# Patient Record
Sex: FEMALE | Race: WHITE | NOT HISPANIC OR LATINO | Employment: UNEMPLOYED | URBAN - METROPOLITAN AREA
[De-identification: names, ages, dates, MRNs, and addresses within clinical notes are randomized per-mention and may not be internally consistent; named-entity substitution may affect disease eponyms.]

---

## 2017-01-06 ENCOUNTER — TELEPHONE (OUTPATIENT)
Dept: INTERNAL MEDICINE | Age: 32
End: 2017-01-06

## 2017-01-06 RX ORDER — CLONAZEPAM 1 MG/1
1 TABLET ORAL 3 TIMES DAILY PRN
Qty: 90 TABLET | Refills: 0 | Status: SHIPPED
Start: 2017-01-06 | End: 2017-02-03 | Stop reason: SDUPTHER

## 2017-01-06 RX ORDER — SUMATRIPTAN 100 MG/1
100 TABLET, FILM COATED ORAL PRN
Qty: 9 TABLET | Refills: 5 | Status: SHIPPED | OUTPATIENT
Start: 2017-01-06 | End: 2017-06-30 | Stop reason: SDUPTHER

## 2017-01-06 RX ORDER — SERTRALINE HYDROCHLORIDE 100 MG/1
100 TABLET, FILM COATED ORAL DAILY
Qty: 30 TABLET | Refills: 5 | Status: SHIPPED | OUTPATIENT
Start: 2017-01-06 | End: 2017-07-17 | Stop reason: SDUPTHER

## 2017-01-30 RX ORDER — BUPROPION HYDROCHLORIDE 300 MG/1
300 TABLET ORAL DAILY
Qty: 30 TABLET | Refills: 5 | Status: SHIPPED | OUTPATIENT
Start: 2017-01-30 | End: 2017-09-07 | Stop reason: SDUPTHER

## 2017-02-06 RX ORDER — CLONAZEPAM 1 MG/1
1 TABLET ORAL 3 TIMES DAILY PRN
Qty: 90 TABLET | Refills: 0 | Status: SHIPPED
Start: 2017-02-06 | End: 2017-03-03 | Stop reason: SDUPTHER

## 2017-03-06 RX ORDER — CLONAZEPAM 1 MG/1
1 TABLET ORAL 3 TIMES DAILY
Qty: 90 TABLET | Refills: 0 | Status: SHIPPED
Start: 2017-03-08 | End: 2017-04-07 | Stop reason: SDUPTHER

## 2017-04-07 RX ORDER — CLONAZEPAM 1 MG/1
1 TABLET ORAL 3 TIMES DAILY
Qty: 90 TABLET | Refills: 0 | Status: SHIPPED
Start: 2017-04-07 | End: 2017-05-07 | Stop reason: SDUPTHER

## 2017-05-07 ENCOUNTER — TELEPHONE (OUTPATIENT)
Dept: PSYCHIATRY | Age: 32
End: 2017-05-07

## 2017-05-08 ENCOUNTER — TELEPHONE (OUTPATIENT)
Dept: INTERNAL MEDICINE | Age: 32
End: 2017-05-08

## 2017-05-09 RX ORDER — CLONAZEPAM 1 MG/1
1 TABLET ORAL 3 TIMES DAILY
Qty: 90 TABLET | Refills: 0 | Status: SHIPPED
Start: 2017-05-09

## 2017-05-09 RX ORDER — QUETIAPINE FUMARATE 100 MG/1
200 TABLET, FILM COATED ORAL NIGHTLY
Qty: 60 TABLET | Refills: 5 | Status: SHIPPED | OUTPATIENT
Start: 2017-05-09

## 2017-06-28 ENCOUNTER — TELEPHONE (OUTPATIENT)
Dept: INTERNAL MEDICINE | Age: 32
End: 2017-06-28

## 2017-06-30 RX ORDER — SUMATRIPTAN 100 MG/1
100 TABLET, FILM COATED ORAL PRN
Qty: 9 TABLET | Refills: 5 | Status: SHIPPED | OUTPATIENT
Start: 2017-06-30 | End: 2018-01-09 | Stop reason: SDUPTHER

## 2017-07-17 RX ORDER — SERTRALINE HYDROCHLORIDE 100 MG/1
100 TABLET, FILM COATED ORAL DAILY
Qty: 30 TABLET | Refills: 5 | Status: SHIPPED | OUTPATIENT
Start: 2017-07-17

## 2017-09-08 RX ORDER — BUPROPION HYDROCHLORIDE 300 MG/1
300 TABLET ORAL DAILY
Qty: 30 TABLET | Refills: 0 | Status: SHIPPED | OUTPATIENT
Start: 2017-09-08

## 2017-09-22 ENCOUNTER — OFFICE VISIT (OUTPATIENT)
Dept: FAMILY MEDICINE | Facility: CLINIC | Age: 32
End: 2017-09-22
Payer: COMMERCIAL

## 2017-09-22 VITALS
SYSTOLIC BLOOD PRESSURE: 102 MMHG | WEIGHT: 140.9 LBS | OXYGEN SATURATION: 96 % | BODY MASS INDEX: 24.05 KG/M2 | HEART RATE: 102 BPM | HEIGHT: 64 IN | DIASTOLIC BLOOD PRESSURE: 62 MMHG | TEMPERATURE: 99.4 F

## 2017-09-22 DIAGNOSIS — R10.84 ABDOMINAL PAIN, GENERALIZED: Primary | ICD-10-CM

## 2017-09-22 DIAGNOSIS — M79.7 FIBROMYALGIA: ICD-10-CM

## 2017-09-22 DIAGNOSIS — G43.009 MIGRAINE WITHOUT AURA AND WITHOUT STATUS MIGRAINOSUS, NOT INTRACTABLE: ICD-10-CM

## 2017-09-22 DIAGNOSIS — F33.1 MODERATE EPISODE OF RECURRENT MAJOR DEPRESSIVE DISORDER (H): ICD-10-CM

## 2017-09-22 PROBLEM — Z13.6 CARDIOVASCULAR SCREENING; LDL GOAL LESS THAN 160: Status: ACTIVE | Noted: 2017-09-22

## 2017-09-22 LAB
BASOPHILS # BLD AUTO: 0 10E9/L (ref 0–0.2)
BASOPHILS NFR BLD AUTO: 0.3 %
DIFFERENTIAL METHOD BLD: NORMAL
EOSINOPHIL # BLD AUTO: 0.3 10E9/L (ref 0–0.7)
EOSINOPHIL NFR BLD AUTO: 4.4 %
ERYTHROCYTE [DISTWIDTH] IN BLOOD BY AUTOMATED COUNT: 11.9 % (ref 10–15)
HCT VFR BLD AUTO: 39.9 % (ref 35–47)
HGB BLD-MCNC: 12.9 G/DL (ref 11.7–15.7)
LYMPHOCYTES # BLD AUTO: 1.9 10E9/L (ref 0.8–5.3)
LYMPHOCYTES NFR BLD AUTO: 31.2 %
MCH RBC QN AUTO: 29.1 PG (ref 26.5–33)
MCHC RBC AUTO-ENTMCNC: 32.3 G/DL (ref 31.5–36.5)
MCV RBC AUTO: 90 FL (ref 78–100)
MONOCYTES # BLD AUTO: 0.6 10E9/L (ref 0–1.3)
MONOCYTES NFR BLD AUTO: 9.8 %
NEUTROPHILS # BLD AUTO: 3.2 10E9/L (ref 1.6–8.3)
NEUTROPHILS NFR BLD AUTO: 54.3 %
PLATELET # BLD AUTO: 251 10E9/L (ref 150–450)
RBC # BLD AUTO: 4.43 10E12/L (ref 3.8–5.2)
WBC # BLD AUTO: 5.9 10E9/L (ref 4–11)

## 2017-09-22 PROCEDURE — 80053 COMPREHEN METABOLIC PANEL: CPT | Performed by: NURSE PRACTITIONER

## 2017-09-22 PROCEDURE — 99204 OFFICE O/P NEW MOD 45 MIN: CPT | Performed by: NURSE PRACTITIONER

## 2017-09-22 PROCEDURE — 36415 COLL VENOUS BLD VENIPUNCTURE: CPT | Performed by: NURSE PRACTITIONER

## 2017-09-22 PROCEDURE — 85025 COMPLETE CBC W/AUTO DIFF WBC: CPT | Performed by: NURSE PRACTITIONER

## 2017-09-22 RX ORDER — SUMATRIPTAN 100 MG/1
TABLET, FILM COATED ORAL
COMMUNITY
Start: 2017-02-03

## 2017-09-22 RX ORDER — QUETIAPINE FUMARATE 100 MG/1
TABLET, FILM COATED ORAL
Refills: 5 | COMMUNITY
Start: 2017-09-07 | End: 2018-01-10

## 2017-09-22 RX ORDER — BUPROPION HYDROCHLORIDE 300 MG/1
TABLET ORAL
COMMUNITY
Start: 2017-01-30 | End: 2017-10-12

## 2017-09-22 RX ORDER — SERTRALINE HYDROCHLORIDE 100 MG/1
TABLET, FILM COATED ORAL
COMMUNITY
Start: 2017-02-15 | End: 2018-01-10

## 2017-09-22 ASSESSMENT — ANXIETY QUESTIONNAIRES
GAD7 TOTAL SCORE: 4
2. NOT BEING ABLE TO STOP OR CONTROL WORRYING: NOT AT ALL
5. BEING SO RESTLESS THAT IT IS HARD TO SIT STILL: SEVERAL DAYS
1. FEELING NERVOUS, ANXIOUS, OR ON EDGE: NOT AT ALL
6. BECOMING EASILY ANNOYED OR IRRITABLE: SEVERAL DAYS
IF YOU CHECKED OFF ANY PROBLEMS ON THIS QUESTIONNAIRE, HOW DIFFICULT HAVE THESE PROBLEMS MADE IT FOR YOU TO DO YOUR WORK, TAKE CARE OF THINGS AT HOME, OR GET ALONG WITH OTHER PEOPLE: SOMEWHAT DIFFICULT
7. FEELING AFRAID AS IF SOMETHING AWFUL MIGHT HAPPEN: NOT AT ALL
3. WORRYING TOO MUCH ABOUT DIFFERENT THINGS: SEVERAL DAYS

## 2017-09-22 ASSESSMENT — PATIENT HEALTH QUESTIONNAIRE - PHQ9
5. POOR APPETITE OR OVEREATING: SEVERAL DAYS
SUM OF ALL RESPONSES TO PHQ QUESTIONS 1-9: 8

## 2017-09-22 NOTE — MR AVS SNAPSHOT
After Visit Summary   9/22/2017    Jeannette Quesada    MRN: [id number]           Patient Information     Date Of Birth          1985        Visit Information        Provider Department      9/22/2017 8:40 AM Alda Mosquera Ra, APRN CNP [facility]        Today's Diagnoses     Abdominal pain, generalized    -  1    Migraine without aura and without status migrainosus, not intractable        Moderate episode of recurrent major depressive disorder (H)        Fibromyalgia           Follow-ups after your visit        Additional Services     PAIN MANAGEMENT CENTER ([place]) REFERRAL       Your provider has referred you to the [facility].    Reason for Referral: Comprehensive Evaluation and Management    Please complete the following questions:    What is your diagnosis for the patient's pain? Hx of fibromyalgia    Do you have any specific questions for the pain specialist? No    Are there any red flags that may impact the assessment or management of the patient? None    **ANY DIAGNOSTIC TESTS THAT ARE NOT IN EPIC SHOULD BE SENT TO THE PAIN CENTER**    Please note the Pre-Op Pain Consult must be scheduled 2-3 weeks prior to the patient's surgery.  Patient's trying to schedule within 2 weeks of surgery may not be accommodated.     Pre-Op Pain Consults are only good for 30 days.    REGARDING OPIOID MEDICATIONS:  We will always address appropriateness of opioid pain medications, but we generally will not automatically take on a prescribing role. When we do take on prescribing of opioids for chronic pain, it is in collaboration with the referring physician for an intermediate period of time (months), with an expectation that the primary physician or provider will assume the prescribing role if medications are effective at stable doses with demonstrated compliance.  Therefore, please do not assume that your prescribing responsibilities end on the day of pain clinic  "consultation.  Is this agreeable to you? YES    For any questions, contact the [facility] at [phone].    Please be aware that coverage of these services is subject to the terms and limitations of your health insurance plan.  Call member services at your health plan with any benefit or coverage questions.      Please bring the following with you to your appointment:    (1) Any X-Rays, CTs or MRIs which have been performed.  Contact the facility where they were done to arrange for  prior to your scheduled appointment.    (2) List of current medications   (3) This referral request   (4) Any documents/labs given to you for this referral                  Who to contact     If you have questions or need follow up information about today's clinic visit or your schedule please contact [facility] directly at [phone].  Normal or non-critical lab and imaging results will be communicated to you by MyChart, letter or phone within 4 business days after the clinic has received the results. If you do not hear from us within 7 days, please contact the clinic through MyChart or phone. If you have a critical or abnormal lab result, we will notify you by phone as soon as possible.  Submit refill requests through [employer] or call your pharmacy and they will forward the refill request to us. Please allow 3 business days for your refill to be completed.          Additional Information About Your Visit        ZALP Information     [employer] lets you send messages to your doctor, view your test results, renew your prescriptions, schedule appointments and more. To sign up, go to [email or web address] . Click on \"Log in\" on the left side of the screen, which will take you to the Welcome page. Then click on \"Sign up Now\" on the right side of the page.     You will be asked to enter the access code listed below, as well as some personal information. Please follow the directions to create " "your username and password.     Your access code is: [email or web address]  Expires: 2017  4:26 PM     Your access code will  in 90 days. If you need help or a new code, please call your [facility] clinic or [phone].        Care EveryWhere ID     This is your Care EveryWhere ID. This could be used by other organizations to access your [facility] medical records  [id number]        Your Vitals Were     Pulse Temperature Height Last Period Pulse Oximetry BMI (Body Mass Index)    102 99.4  F (37.4  C) (Oral) 5' 4\" (1.626 m) 2017 96% 24.19 kg/m2       Blood Pressure from Last 3 Encounters:   17 102/62    Weight from Last 3 Encounters:   17 140 lb 14.4 oz (63.9 kg)              We Performed the Following     CBC with platelets and differential     Comprehensive metabolic panel (BMP + Alb, Alk Phos, ALT, AST, Total. Bili, TP)     PAIN MANAGEMENT CENTER ([place]) REFERRAL        Primary Care Provider Office Phone # Fax #    Alda Stokes Eveline, APRN [facility] [phone]       [address] [facility] [address]        Equal Access to Services     PEPPER VASQUEZ AH: Hadii aad ku hadasho Soomaali, waaxda luqadaha, qaybta kaalmada adeegyada, waxay idiin hayaan adeeg óscararajeremiah la'yari . So [facility] [phone].    ATENCIÓN: Si habla español, tiene a modi disposición servicios gratuitos de asistencia lingüística. Llame al [phone].    We comply with applicable federal civil rights laws and [place] laws. We do not discriminate on the basis of race, color, national origin, age, disability sex, sexual orientation or gender identity.            Thank you!     Thank you for choosing [facility]  for your care. Our goal is always to provide you with excellent care. Hearing back from our patients is one way we can continue to improve our services. Please take a few minutes to complete the written survey that you may receive in the mail after your visit with us. Thank you!             Your " Updated Medication List - Protect others around you: Learn how to safely use, store and throw away your medicines at www.disposemymeds.org.          This list is accurate as of: 9/22/17  4:26 PM.  Always use your most recent med list.                   Brand Name Dispense Instructions for use Diagnosis    buPROPion 300 MG 24 hr tablet    WELLBUTRIN XL     TK 1 T PO D        QUEtiapine 100 MG tablet    SEROquel     TK 2 TS PO Q NIGHT        sertraline 100 MG tablet    ZOLOFT     TK 1 T PO D        SUMAtriptan 100 MG tablet    IMITREX     TK 1 T PO PRN FOR MIGRAINE. MAY REPEAT AFTER 1-2 HOURS IF INEFFECTIVE. MAX 2 T PER 24 HOURS.

## 2017-09-22 NOTE — PROGRESS NOTES
SUBJECTIVE:   Jeannette Quesada is a 32 year old female who presents to clinic today for the following health issues:      Abdominal Pain      Duration: 1 week    Description (location/character/radiation): Upper and lower abdomen gets crampy       Associated flank pain: None    Intensity:  moderate    Accompanying signs and symptoms:        Fever/Chills: no        Gas/Bloating: YES       Nausea/vomitting: YES       Diarrhea: YES- Regular stools now- Black x 2 days       Dysuria or Hematuria: no     History (previous similar pain/trauma/previous testing): No,     Precipitating or alleviating factors:       Pain worse with eating/BM/urination: Yes       Pain relieved by BM: YES    Therapies tried and outcome: Pepto and Milk of Mag without relief    LMP:  17    1 week of symptoms, slightly improved.  Initially had some vomiting.  This has resolved but still has some nausea.  Had diarrhea and black stools.  Black stools have resolved, but now having a mix of diarrhea and normal stools.  No fevers.  No known exposures.  Has still been eating and drinking throughout the week.  Smaller amounts of food, but still eating.  Food seems to worsen symptoms.    Last night ate 2 pieces of pizza and had a lot of pain and gas and bloating.  Pain was generalized.    No hx of ulcers.    She has been treated for 9 years by a clinic in [place] for depression.  Stable on meds, no change except dc of clonazepam.    Problem list and histories reviewed & adjusted, as indicated.  Additional history: as documented    Patient Active Problem List   Diagnosis     Migraine without aura and without status migrainosus, not intractable     Moderate episode of recurrent major depressive disorder (H)     CARDIOVASCULAR SCREENING; LDL GOAL LESS THAN 160     Fibromyalgia     Past Surgical History:   Procedure Laterality Date      SECTION       CHOLECYSTECTOMY, LAPOROSCOPIC       TUBAL LIGATION         Social History   Substance Use  "Topics     Smoking status: Current Every Day Smoker     Packs/day: 1.00     Types: Cigarettes     Smokeless tobacco: Never Used      Comment: 1 pack every other day     Alcohol use Yes      Comment: Rare     Family History   Problem Relation Age of Onset     Family History Negative Mother      Family History Negative Father              Reviewed and updated as needed this visit by clinical staff     Reviewed and updated as needed this visit by Provider         ROS:  SEE HPI.    OBJECTIVE:     /62  Pulse 102  Temp 99.4  F (37.4  C) (Oral)  Ht 5' 4\" (1.626 m)  Wt 140 lb 14.4 oz (63.9 kg)  LMP 08/28/2017  SpO2 96%  BMI 24.19 kg/m2  Body mass index is 24.19 kg/(m^2).  GENERAL: healthy, alert and no distress  RESP: lungs clear to auscultation - no rales, rhonchi or wheezes  CV: regular rate and rhythm, normal S1 S2, no S3 or S4, no murmur, click or rub, no peripheral edema and peripheral pulses strong  ABDOMEN: bowel sounds normal and soft, no guarding.  Ttp RLQ.  PSYCH: mentation appears normal, affect normal/bright    Diagnostic Test Results:  Results for orders placed or performed in visit on 09/22/17 (from the past 24 hour(s))   CBC with platelets and differential   Result Value Ref Range    WBC 5.9 4.0 - 11.0 10e9/L    RBC Count 4.43 3.8 - 5.2 10e12/L    Hemoglobin 12.9 11.7 - 15.7 g/dL    Hematocrit 39.9 35.0 - 47.0 %    MCV 90 78 - 100 fl    MCH 29.1 26.5 - 33.0 pg    MCHC 32.3 31.5 - 36.5 g/dL    RDW 11.9 10.0 - 15.0 %    Platelet Count 251 150 - 450 10e9/L    Diff Method Automated Method     % Neutrophils 54.3 %    % Lymphocytes 31.2 %    % Monocytes 9.8 %    % Eosinophils 4.4 %    % Basophils 0.3 %    Absolute Neutrophil 3.2 1.6 - 8.3 10e9/L    Absolute Lymphocytes 1.9 0.8 - 5.3 10e9/L    Absolute Monocytes 0.6 0.0 - 1.3 10e9/L    Absolute Eosinophils 0.3 0.0 - 0.7 10e9/L    Absolute Basophils 0.0 0.0 - 0.2 10e9/L       ASSESSMENT/PLAN:   1. Abdominal pain, generalized  32 y.o. Female, here today " with GI concerns x1 week.    Mildly tachy, low grade fever, symptoms still significant after 1 week.  CBC WNL.  She is taking in food and fluids and reports symptoms are improving.  Had to leave for work.  Nurse called with CBC results and options.  She prefers to monitor closely over the next few days, aware of the need to seek care immediately if symptoms change or worsen.  If continue to resolve no follow up.  If still present on Monday will follow up.  Pt agrees with plan and verbalized understanding.  - CBC with platelets and differential  - Comprehensive metabolic panel (BMP + Alb, Alk Phos, ALT, AST, Total. Bili, TP)    2. Migraine without aura and without status migrainosus, not intractable  Stable, does not need refill of medication.    3. Moderate episode of recurrent major depressive disorder (H)  Stable, does not need medication refill at this time.  I will refill these when needed as long as the dosing remains stable.    4. Fibromyalgia  Interested in continuing in [place] with pain management.  Referral placed.    TONA Ford Ra, CNP  [facility]

## 2017-09-22 NOTE — NURSING NOTE
"Chief Complaint   Patient presents with     Abdominal Pain       Initial /62  Pulse 102  Temp 99.4  F (37.4  C) (Oral)  Ht 5' 4\" (1.626 m)  Wt 140 lb 14.4 oz (63.9 kg)  LMP 08/28/2017  SpO2 96%  BMI 24.19 kg/m2 Estimated body mass index is 24.19 kg/(m^2) as calculated from the following:    Height as of this encounter: 5' 4\" (1.626 m).    Weight as of this encounter: 140 lb 14.4 oz (63.9 kg).  Medication Reconciliation: complete   Pao FELIZ M.A.      "

## 2017-09-22 NOTE — LETTER
September 26, 2017      Jeannette Quesada  [address]  [place] [address]        Dear ,    We are writing to inform you of your test results.    Your test results fall within the expected range(s) or remain unchanged from previous results.  Please continue with current treatment plan.    Resulted Orders   CBC with platelets and differential   Result Value Ref Range    WBC 5.9 4.0 - 11.0 10e9/L    RBC Count 4.43 3.8 - 5.2 10e12/L    Hemoglobin 12.9 11.7 - 15.7 g/dL    Hematocrit 39.9 35.0 - 47.0 %    MCV 90 78 - 100 fl    MCH 29.1 26.5 - 33.0 pg    MCHC 32.3 31.5 - 36.5 g/dL    RDW 11.9 10.0 - 15.0 %    Platelet Count 251 150 - 450 10e9/L    Diff Method Automated Method     % Neutrophils 54.3 %    % Lymphocytes 31.2 %    % Monocytes 9.8 %    % Eosinophils 4.4 %    % Basophils 0.3 %    Absolute Neutrophil 3.2 1.6 - 8.3 10e9/L    Absolute Lymphocytes 1.9 0.8 - 5.3 10e9/L    Absolute Monocytes 0.6 0.0 - 1.3 10e9/L    Absolute Eosinophils 0.3 0.0 - 0.7 10e9/L    Absolute Basophils 0.0 0.0 - 0.2 10e9/L   Comprehensive metabolic panel (BMP + Alb, Alk Phos, ALT, AST, Total. Bili, TP)   Result Value Ref Range    Sodium 140 133 - 144 mmol/L    Potassium 3.9 3.4 - 5.3 mmol/L    Chloride 106 94 - 109 mmol/L    Carbon Dioxide 30 20 - 32 mmol/L    Anion Gap 4 3 - 14 mmol/L    Glucose 96 70 - 99 mg/dL    Urea Nitrogen 15 7 - 30 mg/dL    Creatinine 0.73 0.52 - 1.04 mg/dL    GFR Estimate >90 >60 mL/min/1.7m2      Comment:      Non  GFR Calc    GFR Estimate If Black >90 >60 mL/min/1.7m2      Comment:       GFR Calc    Calcium 8.8 8.5 - 10.1 mg/dL    Bilirubin Total 0.2 0.2 - 1.3 mg/dL    Albumin 4.2 3.4 - 5.0 g/dL    Protein Total 7.4 6.8 - 8.8 g/dL    Alkaline Phosphatase 71 40 - 150 U/L    ALT 17 0 - 50 U/L    AST 15 0 - 45 U/L       If you have any questions or concerns, please call the clinic at the number listed above.       Sincerely,        TONA Ford Ra  CNP

## 2017-09-23 LAB
ALBUMIN SERPL-MCNC: 4.2 G/DL (ref 3.4–5)
ALP SERPL-CCNC: 71 U/L (ref 40–150)
ALT SERPL W P-5'-P-CCNC: 17 U/L (ref 0–50)
ANION GAP SERPL CALCULATED.3IONS-SCNC: 4 MMOL/L (ref 3–14)
AST SERPL W P-5'-P-CCNC: 15 U/L (ref 0–45)
BILIRUB SERPL-MCNC: 0.2 MG/DL (ref 0.2–1.3)
BUN SERPL-MCNC: 15 MG/DL (ref 7–30)
CALCIUM SERPL-MCNC: 8.8 MG/DL (ref 8.5–10.1)
CHLORIDE SERPL-SCNC: 106 MMOL/L (ref 94–109)
CO2 SERPL-SCNC: 30 MMOL/L (ref 20–32)
CREAT SERPL-MCNC: 0.73 MG/DL (ref 0.52–1.04)
GFR SERPL CREATININE-BSD FRML MDRD: >90 ML/MIN/1.7M2
GLUCOSE SERPL-MCNC: 96 MG/DL (ref 70–99)
POTASSIUM SERPL-SCNC: 3.9 MMOL/L (ref 3.4–5.3)
PROT SERPL-MCNC: 7.4 G/DL (ref 6.8–8.8)
SODIUM SERPL-SCNC: 140 MMOL/L (ref 133–144)

## 2017-09-23 ASSESSMENT — ANXIETY QUESTIONNAIRES: GAD7 TOTAL SCORE: 4

## 2017-10-09 ENCOUNTER — TELEPHONE (OUTPATIENT)
Dept: FAMILY MEDICINE | Facility: CLINIC | Age: 32
End: 2017-10-09

## 2017-10-09 NOTE — TELEPHONE ENCOUNTER
Panel Management Review      Patient has the following on her problem list:     Depression / Dysthymia review    Measure:  Needs PHQ-9 score of 4 or less during index window.  Administer PHQ-9 and if score is 5 or more, send encounter to provider for next steps.    5 - 7 month window range:     PHQ-9 SCORE 9/22/2017   Total Score 8       If PHQ-9 recheck is 5 or more, route to provider for next steps.    Patient is due for:  None        Composite cancer screening  Chart review shows that this patient is due/due soon for the following Pap Smear  Summary:    Patient is due/failing the following:   PAP and PHYSICAL    Action needed:   Patient needs office visit for Pap and PX.    Type of outreach:    Phone, left message for patient to call back.     Questions for provider review:    None                                                                                                                                    Pao FELIZ M.A.       Chart routed to Care Team .

## 2017-10-10 RX ORDER — BUPROPION HYDROCHLORIDE 300 MG/1
TABLET ORAL
Qty: 30 TABLET | Refills: 0 | OUTPATIENT
Start: 2017-10-10

## 2017-10-12 ENCOUNTER — OFFICE VISIT (OUTPATIENT)
Dept: FAMILY MEDICINE | Facility: CLINIC | Age: 32
End: 2017-10-12
Payer: COMMERCIAL

## 2017-10-12 VITALS
HEIGHT: 65 IN | WEIGHT: 146.5 LBS | DIASTOLIC BLOOD PRESSURE: 70 MMHG | TEMPERATURE: 98 F | BODY MASS INDEX: 24.41 KG/M2 | HEART RATE: 84 BPM | SYSTOLIC BLOOD PRESSURE: 98 MMHG | RESPIRATION RATE: 16 BRPM

## 2017-10-12 DIAGNOSIS — F33.1 MODERATE EPISODE OF RECURRENT MAJOR DEPRESSIVE DISORDER (H): ICD-10-CM

## 2017-10-12 DIAGNOSIS — G43.009 MIGRAINE WITHOUT AURA AND WITHOUT STATUS MIGRAINOSUS, NOT INTRACTABLE: Primary | ICD-10-CM

## 2017-10-12 DIAGNOSIS — H53.9 VISION CHANGES: ICD-10-CM

## 2017-10-12 PROCEDURE — 99213 OFFICE O/P EST LOW 20 MIN: CPT | Performed by: NURSE PRACTITIONER

## 2017-10-12 RX ORDER — BUPROPION HYDROCHLORIDE 300 MG/1
TABLET ORAL
Qty: 30 TABLET | Status: CANCELLED | OUTPATIENT
Start: 2017-10-12

## 2017-10-12 RX ORDER — BUPROPION HYDROCHLORIDE 300 MG/1
TABLET ORAL
Qty: 90 TABLET | Refills: 1 | Status: SHIPPED | OUTPATIENT
Start: 2017-10-12 | End: 2018-03-21

## 2017-10-12 ASSESSMENT — ANXIETY QUESTIONNAIRES
GAD7 TOTAL SCORE: 5
IF YOU CHECKED OFF ANY PROBLEMS ON THIS QUESTIONNAIRE, HOW DIFFICULT HAVE THESE PROBLEMS MADE IT FOR YOU TO DO YOUR WORK, TAKE CARE OF THINGS AT HOME, OR GET ALONG WITH OTHER PEOPLE: NOT DIFFICULT AT ALL
1. FEELING NERVOUS, ANXIOUS, OR ON EDGE: SEVERAL DAYS
3. WORRYING TOO MUCH ABOUT DIFFERENT THINGS: SEVERAL DAYS
7. FEELING AFRAID AS IF SOMETHING AWFUL MIGHT HAPPEN: NOT AT ALL
5. BEING SO RESTLESS THAT IT IS HARD TO SIT STILL: SEVERAL DAYS
6. BECOMING EASILY ANNOYED OR IRRITABLE: SEVERAL DAYS
2. NOT BEING ABLE TO STOP OR CONTROL WORRYING: NOT AT ALL

## 2017-10-12 ASSESSMENT — PATIENT HEALTH QUESTIONNAIRE - PHQ9
SUM OF ALL RESPONSES TO PHQ QUESTIONS 1-9: 6
5. POOR APPETITE OR OVEREATING: SEVERAL DAYS

## 2017-10-12 NOTE — PROGRESS NOTES
SUBJECTIVE:   Jeannette Quesada is a 32 year old female who presents to clinic today for the following health issues:      Migraine Follow-Up    Headaches symptoms:  Stable     Frequency: 2-5 times a month    Duration of headaches:1-7 days    Able to do normal daily activities/work with migraines: Yes. intermittent    Rescue/Relief medication:sumatriptan (Imitrex)              Effectiveness: minor relief. Feeling a  Little better today    Preventative medication: None    Neurologic complications: No new stroke-like symptoms? Sensitive to light    In the past 4 weeks, how often have you gone to Urgent Care or the emergency room because of your headaches?  0        Amount of exercise or physical activity: None    Problems taking medications regularly: No. Rx is used for onset of HA    Medication side effects: none  Diet: regular (no restrictions)    Pt has a long hx of migraines.  Previously were quite bothersome, but more recently had been pretty well controlled with symptoms only 2 times monthly which was acceptable to her.  Over the past 3 weeks she has had an increase in symptoms, now having migraines about 2 times weekly.  She has not changed her diet.  No URI symptoms.  No fevers.  No sob or chest symptoms.  She has taken imitrex but it has taken longer for her symptoms to respond.  She is wondering if this is because she has started wearing her glasses.  No eye exam for about 1.5 years.  She started wearing her glasses around the time that her migraines started increasing in frequency.    Problem list and histories reviewed & adjusted, as indicated.  Additional history: as documented    Patient Active Problem List   Diagnosis     Migraine without aura and without status migrainosus, not intractable     Moderate episode of recurrent major depressive disorder (H)     CARDIOVASCULAR SCREENING; LDL GOAL LESS THAN 160     Fibromyalgia     Past Surgical History:   Procedure Laterality Date      SECTION    "    CHOLECYSTECTOMY, LAPOROSCOPIC       TUBAL LIGATION         Social History   Substance Use Topics     Smoking status: Current Every Day Smoker     Packs/day: 1.00     Types: Cigarettes     Smokeless tobacco: Never Used      Comment: 1 pack every other day     Alcohol use Yes      Comment: Rare     Family History   Problem Relation Age of Onset     Family History Negative Mother      Family History Negative Father              Reviewed and updated as needed this [facility]       Reviewed and updated as needed this visit by Provider         ROS:  SEE HPI.    OBJECTIVE:     BP 98/70  Pulse 84  Temp 98  F (36.7  C) (Oral)  Resp 16  Ht 5' 4.5\" (1.638 m)  Wt 146 lb 8 oz (66.5 kg)  LMP 08/23/2017  BMI 24.76 kg/m2  Body mass index is 24.76 kg/(m^2).  GENERAL: healthy, alert and no distress  EYES: Eyes grossly normal to inspection, PERRL and conjunctivae and sclerae normal  HENT: ear canals and TM's normal, nose and mouth without ulcers or lesions  NECK: no adenopathy, no asymmetry, masses, or scars and thyroid normal to palpation  RESP: lungs clear to auscultation - no rales, rhonchi or wheezes  CV: regular rate and rhythm, normal S1 S2, no S3 or S4, no murmur, click or rub, no peripheral edema and peripheral pulses strong  NEURO: Normal strength and tone, sensory exam grossly normal, mentation intact and cranial nerves 2-12 intact  PSYCH: mentation appears normal, affect normal/bright    Diagnostic Test Results:  none     ASSESSMENT/PLAN:   1. Migraine without aura and without status migrainosus, not intractable  32 y.o. Female, here today with concerns regarding increasing frequency of migraines.  Possibly due to eyeglasses, will have eyes checked and update me.  Pt agrees with plan and verbalized understanding.    2. Moderate episode of recurrent major depressive disorder (H)  Stable.  Refilled.  - buPROPion (WELLBUTRIN XL) 300 MG 24 hr tablet; Take 1 tablet every day.  Dispense: " 90 tablet; Refill: 1    3. Vision changes  Needs vision check.  - OPHTHALMOLOGY ADULT REFERRAL    TONA Ford [facility]

## 2017-10-12 NOTE — MR AVS SNAPSHOT
After Visit Summary   10/12/2017    Jeannette Quesada    MRN: [id number]           Patient Information     Date Of Birth          1985        Visit Information        Provider Department      10/12/2017 9:20 AM Alda Mosquera Ra, APRN CNP [facility]        Today's Diagnoses     Moderate episode of recurrent major depressive disorder (H)    -  1    Vision changes          Care Instructions    [facility]  [phone]           Follow-ups after your visit        Additional Services     OPHTHALMOLOGY ADULT REFERRAL       Your provider has referred you to: N: [facility]  [phone]  [email or web address]    Please be aware that coverage of these services is subject to the terms and limitations of your health insurance plan.  Call member services at your health plan with any benefit or coverage questions.      Please bring the following with you to your appointment:    (1) Any X-Rays, CTs or MRIs which have been performed.  Contact the facility where they were done to arrange for  prior to your scheduled appointment.    (2) List of current medications  (3) This referral request   (4) Any documents/labs given to you for this referral                  Who to contact     If you have questions or need follow up information about today's clinic visit or your schedule please contact [facility] directly at [phone].  Normal or non-critical lab and imaging results will be communicated to you by MyChart, letter or phone within 4 business days after the clinic has received the results. If you do not hear from us within 7 days, please contact the clinic through MyChart or phone. If you have a critical or abnormal lab result, we will notify you by phone as soon as possible.  Submit refill requests through [employer] or call your pharmacy and they will forward the refill request to us. Please allow 3 business  "days for your refill to be completed.          Additional Information About Your Visit        ripplrr incharVirtualtwo Information     Adpoints lets you send messages to your doctor, view your test results, renew your prescriptions, schedule appointments and more. To sign up, go to [email or web address] . Click on \"Log in\" on the left side of the screen, which will take you to the Welcome page. Then click on \"Sign up Now\" on the right side of the page.     You will be asked to enter the access code listed below, as well as some personal information. Please follow the directions to create your username and password.     Your access code is: [email or web address]  Expires: 2017  4:26 PM     Your access code will  in 90 days. If you need help or a new code, please call your [facility] clinic or [phone].        Care EveryWhere ID     This is your Care EveryWhere ID. This could be used by other organizations to access your [facility] medical records  [id number]        Your Vitals Were     Pulse Temperature Respirations Height Last Period BMI (Body Mass Index)    84 98  F (36.7  C) (Oral) 16 5' 4.5\" (1.638 m) 2017 24.76 kg/m2       Blood Pressure from Last 3 Encounters:   10/12/17 98/70   17 102/62    Weight from Last 3 Encounters:   10/12/17 146 lb 8 oz (66.5 kg)   17 140 lb 14.4 oz (63.9 kg)              We Performed the Following     OPHTHALMOLOGY ADULT REFERRAL          Today's Medication Changes          These changes are accurate as of: 10/12/17 10:12 AM.  If you have any questions, ask your nurse or doctor.               These medicines have changed or have updated prescriptions.        Dose/Directions    buPROPion 300 MG 24 hr tablet   Commonly known as:  WELLBUTRIN XL   This may have changed:  See the new instructions.   Used for:  Moderate episode of recurrent major depressive disorder (H)   Changed by:  Alda Mosquera Ra, TONA CNP        Take 1 tablet every day.   Quantity:  90 tablet   Refills:  1    "         Where to get your medicines      These medications were sent to [facility] - [place], [place] - [address] [facility]  [address] [address] [address]     Phone:  [phone]     buPROPion 300 MG 24 hr tablet                Primary Care Provider Office Phone # Fax #    Alda Mosquera, TONA [facility] [phone]       [address]  [facility] [address]        Equal Access to Services     [facility] HospitalMELISSA : Hadii aad ku hadasho Soomaali, waaxda luqadaha, qaybta kaalmada adeegyada, waxay idiin hayaan adeeg kharash la'yari . So [facility] [phone].    ATENCIÓN: Si habla español, tiene a modi disposición servicios gratuitos de asistencia lingüística. [facility] [phone].    We comply with applicable federal civil rights laws and [place] laws. We do not discriminate on the basis of race, color, national origin, age, disability, sex, sexual orientation, or gender identity.            Thank you!     Thank you for choosing [facility]  for your care. Our goal is always to provide you with excellent care. Hearing back from our patients is one way we can continue to improve our services. Please take a few minutes to complete the written survey that you may receive in the mail after your visit with us. Thank you!             Your Updated Medication List - Protect others around you: Learn how to safely use, store and throw away your medicines at www.disposemymeds.org.          This list is accurate as of: 10/12/17 10:12 AM.  Always use your most recent med list.                   Brand Name Dispense Instructions for use Diagnosis    buPROPion 300 MG 24 hr tablet    WELLBUTRIN XL    90 tablet    Take 1 tablet every day.    Moderate episode of recurrent major depressive disorder (H)       QUEtiapine 100 MG tablet    SEROquel     TK 2 TS PO Q NIGHT        sertraline 100 MG tablet    ZOLOFT     TK 1 T PO D        SUMAtriptan 100 MG tablet     IMITREX     TK 1 T PO PRN FOR MIGRAINE. MAY REPEAT AFTER 1-2 HOURS IF INEFFECTIVE. MAX 2 T PER 24 HOURS.

## 2017-10-12 NOTE — NURSING NOTE
"Chief Complaint   Patient presents with     Headache       Initial BP 98/70  Pulse 84  Temp 98  F (36.7  C) (Oral)  Resp 16  Ht 5' 4.5\" (1.638 m)  Wt 146 lb 8 oz (66.5 kg)  LMP 08/23/2017  BMI 24.76 kg/m2 Estimated body mass index is 24.76 kg/(m^2) as calculated from the following:    Height as of this encounter: 5' 4.5\" (1.638 m).    Weight as of this encounter: 146 lb 8 oz (66.5 kg).  Medication Reconciliation: complete   Swathi SILVA, CMA,AAMA        "

## 2017-10-13 ASSESSMENT — ANXIETY QUESTIONNAIRES: GAD7 TOTAL SCORE: 5

## 2017-12-01 RX ORDER — QUETIAPINE FUMARATE 100 MG/1
150 TABLET, FILM COATED ORAL EVERY EVENING
Qty: 45 TABLET | Refills: 0 | OUTPATIENT
Start: 2017-12-01

## 2018-01-08 RX ORDER — SERTRALINE HYDROCHLORIDE 100 MG/1
100 TABLET, FILM COATED ORAL DAILY
Qty: 30 TABLET | Refills: 0 | OUTPATIENT
Start: 2018-01-08

## 2018-01-09 RX ORDER — SUMATRIPTAN 100 MG/1
TABLET, FILM COATED ORAL
Qty: 9 TABLET | Refills: 0 | Status: SHIPPED | OUTPATIENT
Start: 2018-01-09

## 2018-01-10 ENCOUNTER — OFFICE VISIT (OUTPATIENT)
Dept: FAMILY MEDICINE | Facility: CLINIC | Age: 33
End: 2018-01-10
Payer: COMMERCIAL

## 2018-01-10 VITALS
WEIGHT: 138.4 LBS | RESPIRATION RATE: 20 BRPM | OXYGEN SATURATION: 98 % | HEART RATE: 96 BPM | TEMPERATURE: 98.3 F | BODY MASS INDEX: 23.06 KG/M2 | DIASTOLIC BLOOD PRESSURE: 68 MMHG | SYSTOLIC BLOOD PRESSURE: 102 MMHG | HEIGHT: 65 IN

## 2018-01-10 DIAGNOSIS — R11.2 NON-INTRACTABLE VOMITING WITH NAUSEA, UNSPECIFIED VOMITING TYPE: ICD-10-CM

## 2018-01-10 DIAGNOSIS — J01.90 ACUTE SINUSITIS WITH SYMPTOMS > 10 DAYS: Primary | ICD-10-CM

## 2018-01-10 DIAGNOSIS — F33.1 MODERATE EPISODE OF RECURRENT MAJOR DEPRESSIVE DISORDER (H): ICD-10-CM

## 2018-01-10 DIAGNOSIS — G43.009 MIGRAINE WITHOUT AURA AND WITHOUT STATUS MIGRAINOSUS, NOT INTRACTABLE: ICD-10-CM

## 2018-01-10 PROCEDURE — 99214 OFFICE O/P EST MOD 30 MIN: CPT | Performed by: NURSE PRACTITIONER

## 2018-01-10 RX ORDER — SUMATRIPTAN 50 MG/1
50 TABLET, FILM COATED ORAL
Qty: 9 TABLET | Refills: 3 | Status: SHIPPED | OUTPATIENT
Start: 2018-01-10 | End: 2018-04-26

## 2018-01-10 RX ORDER — SUMATRIPTAN 100 MG/1
TABLET, FILM COATED ORAL
Qty: 30 TABLET | Status: CANCELLED | OUTPATIENT
Start: 2018-01-10

## 2018-01-10 RX ORDER — SERTRALINE HYDROCHLORIDE 100 MG/1
TABLET, FILM COATED ORAL
Qty: 90 TABLET | Refills: 1 | Status: SHIPPED | OUTPATIENT
Start: 2018-01-10 | End: 2018-06-01

## 2018-01-10 RX ORDER — QUETIAPINE FUMARATE 100 MG/1
200 TABLET, FILM COATED ORAL AT BEDTIME
Qty: 180 TABLET | Refills: 1 | Status: SHIPPED | OUTPATIENT
Start: 2018-01-10 | End: 2018-05-30

## 2018-01-10 ASSESSMENT — ANXIETY QUESTIONNAIRES
6. BECOMING EASILY ANNOYED OR IRRITABLE: SEVERAL DAYS
5. BEING SO RESTLESS THAT IT IS HARD TO SIT STILL: SEVERAL DAYS
3. WORRYING TOO MUCH ABOUT DIFFERENT THINGS: SEVERAL DAYS
2. NOT BEING ABLE TO STOP OR CONTROL WORRYING: NOT AT ALL
1. FEELING NERVOUS, ANXIOUS, OR ON EDGE: NOT AT ALL
GAD7 TOTAL SCORE: 5
7. FEELING AFRAID AS IF SOMETHING AWFUL MIGHT HAPPEN: SEVERAL DAYS
IF YOU CHECKED OFF ANY PROBLEMS ON THIS QUESTIONNAIRE, HOW DIFFICULT HAVE THESE PROBLEMS MADE IT FOR YOU TO DO YOUR WORK, TAKE CARE OF THINGS AT HOME, OR GET ALONG WITH OTHER PEOPLE: SOMEWHAT DIFFICULT

## 2018-01-10 ASSESSMENT — PATIENT HEALTH QUESTIONNAIRE - PHQ9
5. POOR APPETITE OR OVEREATING: SEVERAL DAYS
SUM OF ALL RESPONSES TO PHQ QUESTIONS 1-9: 10

## 2018-01-10 NOTE — MR AVS SNAPSHOT
After Visit Summary   1/10/2018    Jeannette Quesada    MRN: [id number]           Patient Information     Date Of Birth          1985        Visit Information        Provider Department      1/10/2018 9:20 AM Alda Mosquera Ra, APRN CNP [facility]        Today's Diagnoses     Acute sinusitis with symptoms > 10 days    -  1    Non-intractable vomiting with nausea, unspecified vomiting type        Moderate episode of recurrent major depressive disorder (H)        Migraine without aura and without status migrainosus, not intractable          Care Instructions    Continue the mucinex.  Start a probiotic.    I have sent a prescription for an antibiotic for you- do not start this now.  Wait another 3-4 days.  Your upper respiratory symptoms will hopefully resolve with time and rest.  If you start the antibiotic now it will likely upset your stomach quite badly.    Return to clinic if symptoms persist or worsen.              Follow-ups after your visit        Who to contact     If you have questions or need follow up information about today's clinic visit or your schedule please contact [facility] directly at [phone].  Normal or non-critical lab and imaging results will be communicated to you by Diavibehart, letter or phone within 4 business days after the clinic has received the results. If you do not hear from us within 7 days, please contact the clinic through Diavibehart or phone. If you have a critical or abnormal lab result, we will notify you by phone as soon as possible.  Submit refill requests through [employer] or call your pharmacy and they will forward the refill request to us. Please allow 3 business days for your refill to be completed.          Additional Information About Your Visit        DiavibeharAlluring Logic Information     Ariisto lets you send messages to your doctor, view your test results, renew your prescriptions, schedule appointments and more. To sign up, go to  [email or web address] . Click on \"Log in\" on the left side of the screen, which will take you to the Welcome page. Then click on \"Sign up Now\" on the right side of the page.     You will be asked to enter the access code listed below, as well as some personal information. Please follow the directions to create your username and password.     Your access code is: [email or web address]  Expires: 4/10/2018 10:05 AM     Your access code will  in 90 days. If you need help or a new code, please call your [facility] clinic or [phone].        Care EveryWhere ID     This is your Care EveryWhere ID. This could be used by other organizations to access your [facility] medical records  [id number]        Your Vitals Were     Pulse Temperature Respirations Height Pulse Oximetry BMI (Body Mass Index)    96 98.3  F (36.8  C) (Oral) 20 5' 4.5\" (1.638 m) 98% 23.39 kg/m2       Blood Pressure from Last 3 Encounters:   01/10/18 102/68   10/12/17 98/70   17 102/62    Weight from Last 3 Encounters:   01/10/18 138 lb 6.4 oz (62.8 kg)   10/12/17 146 lb 8 oz (66.5 kg)   17 140 lb 14.4 oz (63.9 kg)              We Performed the Following     DEPRESSION ACTION PLAN (DAP)          Today's Medication Changes          These changes are accurate as of: 1/10/18 10:05 AM.  If you have any questions, ask your nurse or doctor.               Start taking these medicines.        Dose/Directions    amoxicillin-clavulanate 875-125 MG per tablet   Commonly known as:  AUGMENTIN   Used for:  Acute sinusitis with symptoms > 10 days   Started by:  Alda Mosquera Ra, TONA CNP        Dose:  1 tablet   Take 1 tablet by mouth 2 times daily   Quantity:  20 tablet   Refills:  0         These medicines have changed or have updated prescriptions.        Dose/Directions    QUEtiapine 100 MG tablet   Commonly known as:  SEROquel   This may have changed:  See the new instructions.   Used for:  Moderate episode of recurrent major depressive disorder (H) "   Changed by:  Alda Mosquera Ra, APRN CNP        Dose:  200 mg   Take 2 tablets (200 mg) by mouth At Bedtime   Quantity:  180 tablet   Refills:  1       sertraline 100 MG tablet   Commonly known as:  ZOLOFT   This may have changed:  See the new instructions.   Used for:  Moderate episode of recurrent major depressive disorder (H)   Changed by:  Alda Mosquera Ra, APRN CNP        Take 1 tablet by mouth daily.   Quantity:  90 tablet   Refills:  1       * SUMAtriptan 100 MG tablet   Commonly known as:  IMITREX   This may have changed:  Another medication with the same name was added. Make sure you understand how and when to take each.   Changed by:  Alda Mosquera Ra, APRN CNP        TK 1 T PO PRN FOR MIGRAINE. MAY REPEAT AFTER 1-2 HOURS IF INEFFECTIVE. MAX 2 T PER 24 HOURS.   Refills:  0       * SUMAtriptan 50 MG tablet   Commonly known as:  IMITREX   This may have changed:  You were already taking a medication with the same name, and this prescription was added. Make sure you understand how and when to take each.   Used for:  Migraine without aura and without status migrainosus, not intractable   Changed by:  Alda Mosquera Ra, APRN CNP        Dose:  50 mg   Take 1 tablet (50 mg) by mouth at onset of headache for migraine May repeat in 1-2 hours.   Quantity:  9 tablet   Refills:  3       * Notice:  This list has 2 medication(s) that are the same as other medications prescribed for you. Read the directions carefully, and ask your doctor or other care provider to review them with you.         Where to get your medicines      These medications were sent to [facility] [address] [place], [place] - [address] [facility] AT [place] [address] & [facility]  [address] [facility] [address]     Phone:  [phone]     amoxicillin-clavulanate 875-125 MG per tablet    QUEtiapine 100 MG tablet    sertraline 100 MG tablet    SUMAtriptan 50 MG tablet                Primary Care Provider Office Phone # Fax #     Alda Mosquera, APRN [facility] [phone]       [address] [facility] [address]        Equal Access to Services     PEPPER VASQUEZ : Hadii desmond lazo hadluiso Soedwinali, waaxda luqadaha, qaybta kaalmada adeclaudy, teresa carvalho laSanamyari sarah. So [facility] [phone].    ATENCIÓN: Si habla español, tiene a modi disposición servicios gratuitos de asistencia lingüística. Llame al [phone].    We comply with applicable federal civil rights laws and [place] laws. We do not discriminate on the basis of race, color, national origin, age, disability, sex, sexual orientation, or gender identity.            Thank you!     Thank you for choosing [facility]  for your care. Our goal is always to provide you with excellent care. Hearing back from our patients is one way we can continue to improve our services. Please take a few minutes to complete the written survey that you may receive in the mail after your visit with us. Thank you!             Your Updated Medication List - Protect others around you: Learn how to safely use, store and throw away your medicines at www.disposemymeds.org.          This list is accurate as of: 1/10/18 10:05 AM.  Always use your most recent med list.                   Brand Name Dispense Instructions for use Diagnosis    amoxicillin-clavulanate 875-125 MG per tablet    AUGMENTIN    20 tablet    Take 1 tablet by mouth 2 times daily    Acute sinusitis with symptoms > 10 days       buPROPion 300 MG 24 hr tablet    WELLBUTRIN XL    90 tablet    Take 1 tablet every day.    Moderate episode of recurrent major depressive disorder (H)       QUEtiapine 100 MG tablet    SEROquel    180 tablet    Take 2 tablets (200 mg) by mouth At Bedtime    Moderate episode of recurrent major depressive disorder (H)       sertraline 100 MG tablet    ZOLOFT    90 tablet    Take 1 tablet by mouth daily.    Moderate episode of recurrent major depressive disorder (H)       *  SUMAtriptan 100 MG tablet    IMITREX     TK 1 T PO PRN FOR MIGRAINE. MAY REPEAT AFTER 1-2 HOURS IF INEFFECTIVE. MAX 2 T PER 24 HOURS.        * SUMAtriptan 50 MG tablet    IMITREX    9 tablet    Take 1 tablet (50 mg) by mouth at onset of headache for migraine May repeat in 1-2 hours.    Migraine without aura and without status migrainosus, not intractable       * Notice:  This list has 2 medication(s) that are the same as other medications prescribed for you. Read the directions carefully, and ask your doctor or other care provider to review them with you.

## 2018-01-10 NOTE — PATIENT INSTRUCTIONS
Continue the mucinex.  Start a probiotic.    I have sent a prescription for an antibiotic for you- do not start this now.  Wait another 3-4 days.  Your upper respiratory symptoms will hopefully resolve with time and rest.  If you start the antibiotic now it will likely upset your stomach quite badly.    Return to clinic if symptoms persist or worsen.

## 2018-01-10 NOTE — NURSING NOTE
"Chief Complaint   Patient presents with     URI       Initial /68  Pulse 96  Temp 98.3  F (36.8  C) (Oral)  Resp 20  Ht 5' 4.5\" (1.638 m)  Wt 138 lb 6.4 oz (62.8 kg)  SpO2 98%  BMI 23.39 kg/m2 Estimated body mass index is 23.39 kg/(m^2) as calculated from the following:    Height as of this encounter: 5' 4.5\" (1.638 m).    Weight as of this encounter: 138 lb 6.4 oz (62.8 kg).  Medication Reconciliation: complete   Pao FELIZ M.A.      "

## 2018-01-10 NOTE — PROGRESS NOTES
SUBJECTIVE:   Jeannette Quesada is a 32 year old female who presents to clinic today for the following health issues:      RESPIRATORY SYMPTOMS      Duration: 7-10 days    Description  nasal congestion, rhinorrhea, sore throat, facial pain/pressure, cough, chills, headache, fatigue/malaise, hoarse voice, myalgias, nausea, stomach ache and diarrhea    Severity: moderate    Accompanying signs and symptoms: None    History (predisposing factors):  none    Precipitating or alleviating factors: None    Therapies tried and outcome:  rest and fluids oral decongestant with some relief    Loose stools, vomiting.  Stomach cramping.  Congestion, cough.  No fevers.  Taking in food and fluids.  Last vomited 2 days ago.  Vomiting seems to be improving.  Congestion and URI symptoms do not seem to be improving.  No known exposure.    Needs refills of imitrex.  Migraines seem to be worse lately.  Thought it was new prescription for glasses that could be causing it.  She has also not been feeling well which doesn't help.    Needs refill on zoloft and seroquel.  Stable on dosing.  Feels they are helpful and she tolerates them well.    Problem list and histories reviewed & adjusted, as indicated.  Additional history: as documented    Patient Active Problem List   Diagnosis     Migraine without aura and without status migrainosus, not intractable     Moderate episode of recurrent major depressive disorder (H)     CARDIOVASCULAR SCREENING; LDL GOAL LESS THAN 160     Fibromyalgia     Past Surgical History:   Procedure Laterality Date      SECTION       CHOLECYSTECTOMY, LAPOROSCOPIC       TUBAL LIGATION         Social History   Substance Use Topics     Smoking status: Current Every Day Smoker     Packs/day: 1.00     Types: Cigarettes     Smokeless tobacco: Never Used      Comment: 1 pack every other day     Alcohol use Yes      Comment: Rare     Family History   Problem Relation Age of Onset     Family History Negative Mother   "    Family History Negative Father              Reviewed and updated as needed this visit by clinical staffTobacco  Allergies  Meds  Med Hx  Surg Hx  Fam Hx  Soc Hx      Reviewed and updated as needed this visit by Provider         ROS:  SEE HPI.    OBJECTIVE:     /68  Pulse 96  Temp 98.3  F (36.8  C) (Oral)  Resp 20  Ht 5' 4.5\" (1.638 m)  Wt 138 lb 6.4 oz (62.8 kg)  SpO2 98%  BMI 23.39 kg/m2  Body mass index is 23.39 kg/(m^2).  GENERAL: healthy, alert and no distress  EYES: Eyes grossly normal to inspection  HENT: normal cephalic/atraumatic, ear canals and TM's normal, nose and mouth without ulcers or lesions, oropharynx clear, oral mucous membranes moist and congested.  NECK: no adenopathy, no asymmetry, masses, or scars and thyroid normal to palpation  RESP: lungs clear to auscultation - no rales, rhonchi or wheezes  CV: regular rate and rhythm, normal S1 S2, no S3 or S4, no murmur, click or rub, no peripheral edema and peripheral pulses strong  ABDOMEN: soft, nontender, without hepatosplenomegaly or masses and bowel sounds normal  PSYCH: mentation appears normal, affect normal/bright    Diagnostic Test Results:  No results found for this or any previous visit (from the past 24 hour(s)).    ASSESSMENT/PLAN:   1. Acute sinusitis with symptoms > 10 days  32 y.o. Female, sinusitis symptoms x1 week.  Discussed continued conservative care.  If symptoms persist without change after 3-4 additional days, will have her start augmentin.  Pt agrees with plan and verbalized understanding.  - amoxicillin-clavulanate (AUGMENTIN) 875-125 MG per tablet; Take 1 tablet by mouth 2 times daily  Dispense: 20 tablet; Refill: 0    2. Non-intractable vomiting with nausea, unspecified vomiting type  Resolving over the past 48 hours.  No further vomiting or diarrhea.  Taking in food and fluids.  Probiotic.      3. Moderate episode of recurrent major depressive disorder (H)  Stable.  Refilled.  - sertraline (ZOLOFT) 100 " MG tablet; Take 1 tablet by mouth daily.  Dispense: 90 tablet; Refill: 1  - QUEtiapine (SEROQUEL) 100 MG tablet; Take 2 tablets (200 mg) by mouth At Bedtime  Dispense: 180 tablet; Refill: 1    4. Migraine without aura and without status migrainosus, not intractable  Refilled.  - SUMAtriptan (IMITREX) 50 MG tablet; Take 1 tablet (50 mg) by mouth at onset of headache for migraine May repeat in 1-2 hours.  Dispense: 9 tablet; Refill: 3    TONA Ford Ra [facility]

## 2018-01-11 ASSESSMENT — ANXIETY QUESTIONNAIRES: GAD7 TOTAL SCORE: 5

## 2018-01-20 ENCOUNTER — TRANSFERRED RECORDS (OUTPATIENT)
Dept: HEALTH INFORMATION MANAGEMENT | Facility: CLINIC | Age: 33
End: 2018-01-20

## 2018-01-21 ENCOUNTER — HEALTH MAINTENANCE LETTER (OUTPATIENT)
Age: 33
End: 2018-01-21

## 2018-01-31 ENCOUNTER — TELEPHONE (OUTPATIENT)
Dept: FAMILY MEDICINE | Facility: CLINIC | Age: 33
End: 2018-01-31

## 2018-01-31 NOTE — TELEPHONE ENCOUNTER
Panel Management Review      Patient has the following on her problem list:     Depression / Dysthymia review    Measure:  Needs PHQ-9 score of 4 or less during index window.  Administer PHQ-9 and if score is 5 or more, send encounter to provider for next steps.    5 - 7 month window range:     PHQ-9 SCORE 9/22/2017 10/12/2017 1/10/2018   Total Score 8 6 10       If PHQ-9 recheck is 5 or more, route to provider for next steps.    Patient is due for:  PHQ9      Composite cancer screening  Chart review shows that this patient is due/due soon for the following Pap Smear  Summary:    Patient is due/failing the following:   PAP, PHQ9 and PHYSICAL    Action needed:   Patient needs office visit for Pap, physical and phq9.    Type of outreach:    Phone, left message for patient to call back.     Questions for provider review:    None                                                                                                                                    Pao FELIZ M.A.       Chart routed to Care Team .

## 2018-02-16 ENCOUNTER — CARE COORDINATION (OUTPATIENT)
Dept: CARE COORDINATION | Facility: CLINIC | Age: 33
End: 2018-02-16

## 2018-02-16 ENCOUNTER — OFFICE VISIT (OUTPATIENT)
Dept: FAMILY MEDICINE | Facility: CLINIC | Age: 33
End: 2018-02-16
Payer: COMMERCIAL

## 2018-02-16 VITALS
HEIGHT: 65 IN | DIASTOLIC BLOOD PRESSURE: 80 MMHG | BODY MASS INDEX: 22.91 KG/M2 | HEART RATE: 110 BPM | WEIGHT: 137.5 LBS | OXYGEN SATURATION: 99 % | TEMPERATURE: 98 F | SYSTOLIC BLOOD PRESSURE: 124 MMHG

## 2018-02-16 DIAGNOSIS — M79.10 MYALGIA: Primary | ICD-10-CM

## 2018-02-16 PROCEDURE — 99213 OFFICE O/P EST LOW 20 MIN: CPT | Performed by: NURSE PRACTITIONER

## 2018-02-16 RX ORDER — GABAPENTIN 300 MG/1
CAPSULE ORAL
Qty: 90 CAPSULE | Refills: 0 | Status: SHIPPED | OUTPATIENT
Start: 2018-02-16 | End: 2018-10-10

## 2018-02-16 ASSESSMENT — ANXIETY QUESTIONNAIRES
7. FEELING AFRAID AS IF SOMETHING AWFUL MIGHT HAPPEN: NEARLY EVERY DAY
GAD7 TOTAL SCORE: 21
3. WORRYING TOO MUCH ABOUT DIFFERENT THINGS: NEARLY EVERY DAY
6. BECOMING EASILY ANNOYED OR IRRITABLE: NEARLY EVERY DAY
2. NOT BEING ABLE TO STOP OR CONTROL WORRYING: NEARLY EVERY DAY
1. FEELING NERVOUS, ANXIOUS, OR ON EDGE: NEARLY EVERY DAY
5. BEING SO RESTLESS THAT IT IS HARD TO SIT STILL: NEARLY EVERY DAY

## 2018-02-16 ASSESSMENT — PATIENT HEALTH QUESTIONNAIRE - PHQ9: 5. POOR APPETITE OR OVEREATING: NEARLY EVERY DAY

## 2018-02-16 NOTE — MR AVS SNAPSHOT
"              After Visit Summary   2018    Jeannette Quesada    MRN: [id number]           Patient Information     Date Of Birth          1985        Visit Information        Provider Department      2018 2:20 PM Alda Mosquera Ra, APRN CNP [facility]        Today's Diagnoses     Myalgia    -  1      Care Instructions    The care coordinator will call you with details regarding your scheduled appointment with the pain clinic.          Follow-ups after your visit        Who to contact     If you have questions or need follow up information about today's clinic visit or your schedule please contact [facility] directly at [phone].  Normal or non-critical lab and imaging results will be communicated to you by MyChart, letter or phone within 4 business days after the clinic has received the results. If you do not hear from us within 7 days, please contact the clinic through MyChart or phone. If you have a critical or abnormal lab result, we will notify you by phone as soon as possible.  Submit refill requests through [employer] or call your pharmacy and they will forward the refill request to us. Please allow 3 business days for your refill to be completed.          Additional Information About Your Visit        MyChart Information     [employer] lets you send messages to your doctor, view your test results, renew your prescriptions, schedule appointments and more. To sign up, go to [email or web address] . Click on \"Log in\" on the left side of the screen, which will take you to the Welcome page. Then click on \"Sign up Now\" on the right side of the page.     You will be asked to enter the access code listed below, as well as some personal information. Please follow the directions to create your username and password.     Your access code is: [email or web address]  Expires: 4/10/2018 10:05 AM     Your access code will  in 90 days. If you need help or a new code, please call " "your [place] clinic or [phone].        Care EveryWhere ID     This is your Care EveryWhere ID. This could be used by other organizations to access your [place] medical records  [id number]        Your Vitals Were     Pulse Temperature Height Pulse Oximetry BMI (Body Mass Index)       110 98  F (36.7  C) (Oral) 5' 4.5\" (1.638 m) 99% 23.24 kg/m2        Blood Pressure from Last 3 Encounters:   02/16/18 124/80   01/10/18 102/68   10/12/17 98/70    Weight from Last 3 Encounters:   02/16/18 137 lb 8 oz (62.4 kg)   01/10/18 138 lb 6.4 oz (62.8 kg)   10/12/17 146 lb 8 oz (66.5 kg)              Today, you had the following     No orders found for display         Today's Medication Changes          These changes are accurate as of 2/16/18  7:25 PM.  If you have any questions, ask your nurse or doctor.               Start taking these medicines.        Dose/Directions    gabapentin 300 MG capsule   Commonly known as:  NEURONTIN   Used for:  Myalgia   Started by:  Alda Mosquera Ra, APRN CNP        Take 1 tablet (300 mg) every night for 1-3 days, then 1 tablet twice daily for 1-3 days, then 1 tablet three times daily   Quantity:  90 capsule   Refills:  0            Where to get your medicines      These medications were sent to [facility] [address] [facility] [address] [facility] AT [place] [address] & [facility]  [address] [facility] [address]     Phone:  [phone]     gabapentin 300 MG capsule                Primary Care Provider Office Phone # Fax #    TONA Ford Ra [facility] [phone] [address]  [facility] [address]        Equal Access to Services     PEPPER VASQUEZ AH: Romana Marinelli, waaxda luqadaha, qaybta kaalmada adeegyada, teresa smith. So [facility] [phone].    ATENCIÓN: Si habla español, tiene a modi disposición servicios gratuitos de asistencia lingüística. Llnikita al [phone].    We comply with " applicable federal civil rights laws and [place] laws. We do not discriminate on the basis of race, color, national origin, age, disability, sex, sexual orientation, or gender identity.            Thank you!     Thank you for choosing [facility]  for your care. Our goal is always to provide you with excellent care. Hearing back from our patients is one way we can continue to improve our services. Please take a few minutes to complete the written survey that you may receive in the mail after your visit with us. Thank you!             Your Updated Medication List - Protect others around you: Learn how to safely use, store and throw away your medicines at www.disposemymeds.org.          This list is accurate as of 2/16/18  7:25 PM.  Always use your most recent med list.                   Brand Name Dispense Instructions for use Diagnosis    buPROPion 300 MG 24 hr tablet    WELLBUTRIN XL    90 tablet    Take 1 tablet every day.    Moderate episode of recurrent major depressive disorder (H)       gabapentin 300 MG capsule    NEURONTIN    90 capsule    Take 1 tablet (300 mg) every night for 1-3 days, then 1 tablet twice daily for 1-3 days, then 1 tablet three times daily    Myalgia       QUEtiapine 100 MG tablet    SEROquel    180 tablet    Take 2 tablets (200 mg) by mouth At Bedtime    Moderate episode of recurrent major depressive disorder (H)       sertraline 100 MG tablet    ZOLOFT    90 tablet    Take 1 tablet by mouth daily.    Moderate episode of recurrent major depressive disorder (H)       * SUMAtriptan 100 MG tablet    IMITREX     TK 1 T PO PRN FOR MIGRAINE. MAY REPEAT AFTER 1-2 HOURS IF INEFFECTIVE. MAX 2 T PER 24 HOURS.        * SUMAtriptan 50 MG tablet    IMITREX    9 tablet    Take 1 tablet (50 mg) by mouth at onset of headache for migraine May repeat in 1-2 hours.    Migraine without aura and without status migrainosus, not intractable       * Notice:  This list has 2 medication(s) that  are the same as other medications prescribed for you. Read the directions carefully, and ask your doctor or other care provider to review them with you.

## 2018-02-16 NOTE — PROGRESS NOTES
Clinic Care Coordination Contact  Care Team Conversations    Spoke to PCP who stated that pt needs to get into a pain provider. SW attempted to reach [facility] which rolled to their on-call service. SW will attempt to outreach to [facility] next week. SW will update pt after contact is made with [facility].   Spoke to [facility]  today who stated that they need the most recent 3-5 office visit notes, imaging: MRI or CTs, and a referral page explaining what pt needs to be seen for and pt's contact information faxed to them at [phone]. Per [facility] , they will contact the pt after receiving this information and will schedule an appointment for her. Message sent to referral specialist asking that she assist with getting the referral to [facility]. PCP was included on the message to update her on the response from [facility].    GINGER Higgins, [facility]  Social Work - Care Coordinator  [facility], [place], and [place]  Phone: [phone]

## 2018-02-16 NOTE — PROGRESS NOTES
SUBJECTIVE:   Jeannette Quesada is a 32 year old female who presents to clinic today for the following health issues:      Fibromyalgia      Duration: Diagnosed 2.5 years ago    Descriptionocation: Major pain    Intensity:  severe    Accompanying signs and symptoms: none    History  Previous similar problem: YES  Previous evaluation:  [place]    Precipitating or alleviating factors:  Trauma or overuse: no   Aggravating factors include: All movement    Therapies tried and outcome: Many medications, narcotics don't work    Pt presents with .  Very uncomfortable over the past few weeks.  Previously tried many meds for fibromyalgia without success, now pain is impacting mood.  Failed gabapentin, lyrica, tramadol, narcotics.  They also report trying both marijuana and suboxone purchased on the street.  Suboxone has been the only thing that has helped her thus far.    She reports never scheduling with the pain clinic because she didn't receive a call last .    Problem list and histories reviewed & adjusted, as indicated.  Additional history: as documented    Patient Active Problem List   Diagnosis     Migraine without aura and without status migrainosus, not intractable     Moderate episode of recurrent major depressive disorder (H)     CARDIOVASCULAR SCREENING; LDL GOAL LESS THAN 160     Fibromyalgia     Past Surgical History:   Procedure Laterality Date      SECTION       CHOLECYSTECTOMY, LAPOROSCOPIC       TUBAL LIGATION         Social History   Substance Use Topics     Smoking status: Current Every Day Smoker     Packs/day: 1.00     Types: Cigarettes     Smokeless tobacco: Never Used      Comment: 1 pack every other day     Alcohol use Yes      Comment: Rare     Family History   Problem Relation Age of Onset     Family History Negative Mother      Family History Negative Father            Reviewed and updated as needed this visit by clinical staff  Tobacco  Allergies  Meds       Reviewed and  "updated as needed this visit by Provider         ROS:  SEE HPI.    OBJECTIVE:     /80  Pulse 110  Temp 98  F (36.7  C) (Oral)  Ht 5' 4.5\" (1.638 m)  Wt 137 lb 8 oz (62.4 kg)  SpO2 99%  BMI 23.24 kg/m2  Body mass index is 23.24 kg/(m^2).  GENERAL: alert and appears uncomfortable.  PSYCH: mentation appears normal, tearful    Diagnostic Test Results:  none     ASSESSMENT/PLAN:   1. Myalgia  32 y.o. Female, previous dx of fibromyalgia at outside clinic, here today with increased pain for the past 2 weeks.  Previously referred to pain clinic, will have care coordination try to get her an appt; first available.  Discussed limited options.  Trial a restart of gabapentin today.  Pt agrees with plan and verbalized understanding.  - gabapentin (NEURONTIN) 300 MG capsule; Take 1 tablet (300 mg) every night for 1-3 days, then 1 tablet twice daily for 1-3 days, then 1 tablet three times daily  Dispense: 90 capsule; Refill: 0    TONA Ford Ra [facility]  "

## 2018-02-16 NOTE — NURSING NOTE
"Chief Complaint   Patient presents with     Fibromyalgia       Initial /80  Pulse 110  Temp 98  F (36.7  C) (Oral)  Ht 5' 4.5\" (1.638 m)  Wt 137 lb 8 oz (62.4 kg)  SpO2 99%  BMI 23.24 kg/m2 Estimated body mass index is 23.24 kg/(m^2) as calculated from the following:    Height as of this encounter: 5' 4.5\" (1.638 m).    Weight as of this encounter: 137 lb 8 oz (62.4 kg).  Medication Reconciliation: complete   Pao FELIZ M.A.      "

## 2018-02-16 NOTE — PROGRESS NOTES
Called patient and left a message that the gabapentin will be faxed to [facility] as they indicated. Apologized for the wait.

## 2018-02-17 ASSESSMENT — PATIENT HEALTH QUESTIONNAIRE - PHQ9: SUM OF ALL RESPONSES TO PHQ QUESTIONS 1-9: 24

## 2018-02-17 ASSESSMENT — ANXIETY QUESTIONNAIRES: GAD7 TOTAL SCORE: 21

## 2018-02-17 NOTE — PATIENT INSTRUCTIONS
The care coordinator will call you with details regarding your scheduled appointment with the pain clinic.

## 2018-02-19 NOTE — PROGRESS NOTES
Will need a new referral.  The referral from September is to the  Pain Management Clinic.    Arabella-Referrals

## 2018-02-19 NOTE — PROGRESS NOTES
Spoke with the Pt's  Jamie (Please note there is not consent to communicate on file)   I did receive verbal permission to speak with his directly from the Pt. They were both very upset about this.   I explained we need this form signed to legally release PHI. Advised they fill form out next time in the clinic, or we can mail.     The Pt and  are requesting new referral be placed today as PCP will not be in the office until Wed.   Will route to covering providers to review to see if they can place new referral.     Yumiko Aranda, RN -- [facility]

## 2018-02-20 NOTE — PROGRESS NOTES
I placed new referral.  She is new to the clinic.  Previous care in [place].  Pt will need to contact previous clinic as they reported seeing several providers, having imaging completed, and several med trials while in [place].  KYRIE

## 2018-03-13 ENCOUNTER — CARE COORDINATION (OUTPATIENT)
Dept: CARE COORDINATION | Facility: CLINIC | Age: 33
End: 2018-03-13

## 2018-03-13 NOTE — PROGRESS NOTES
Clinic Care Coordination Contact    Situation: Patient chart reviewed by care coordinator.    Background: SW had been asked to follow for pain management follow-up.    Assessment: Per chart review, referral specialist was working on referring pt to a provider.     Plan/Recommendations: SW will not continue to follow.    GINGER Higgins, [facility]  Social Work - Care Coordinator  [facility] [place], [place], and [place]  Phone: [phone]

## 2018-03-21 ENCOUNTER — TRANSFERRED RECORDS (OUTPATIENT)
Dept: HEALTH INFORMATION MANAGEMENT | Facility: CLINIC | Age: 33
End: 2018-03-21

## 2018-03-21 ENCOUNTER — TELEPHONE (OUTPATIENT)
Dept: FAMILY MEDICINE | Facility: CLINIC | Age: 33
End: 2018-03-21

## 2018-03-21 DIAGNOSIS — F33.1 MODERATE EPISODE OF RECURRENT MAJOR DEPRESSIVE DISORDER (H): ICD-10-CM

## 2018-03-21 NOTE — TELEPHONE ENCOUNTER
Type of outreach:  Phone, spoke to patient.  Scheduled Pap.  Health Maintenance Due   Topic Date Due     TETANUS IMMUNIZATION (SYSTEM ASSIGNED)  09/03/2003     PAP SCREENING Q3 YR (SYSTEM ASSIGNED)  09/03/2006     Pao FELIZ M.A.

## 2018-03-22 RX ORDER — BUPROPION HYDROCHLORIDE 300 MG/1
TABLET ORAL
Qty: 90 TABLET | Refills: 0 | Status: SHIPPED | OUTPATIENT
Start: 2018-03-22 | End: 2018-06-01

## 2018-03-22 NOTE — TELEPHONE ENCOUNTER
"Requested Prescriptions   Pending Prescriptions Disp Refills     buPROPion (WELLBUTRIN XL) 300 MG 24 hr tablet [Pharmacy Med Name: BUPROPION XL 300MG TABLETS]  Last Written Prescription Date:  10/12/2017  Last Fill Quantity: 90 tablet,  # refills: 1   Last office visit: 2/16/2018 with prescribing provider: Alda Mosquera Ra  Future Office Visit:   Next 5 appointments (look out 90 days)     Mar 23, 2018  1:20 PM CDT   PHYSICAL with TONA Ford Ra, CNP   [facility]    [id number] [place] [address] [phone]                90 tablet 0     Sig: TAKE 1 TABLET BY MOUTH EVERY DAY    SSRIs Protocol Failed    3/21/2018  6:10 PM       Failed - PHQ-9 score less than 5 in past 6 months    Please review last PHQ-9 score.   PHQ-9 SCORE 10/12/2017 1/10/2018 2/16/2018   Total Score 6 10 24     JOEY-7 SCORE 10/12/2017 1/10/2018 2/16/2018   Total Score 5 5 21          Passed - Medication is Bupropion    If the medication is Bupropion (Wellbutrin), and the patient is taking for smoking cessation; OK to refill.         Passed - Patient is age 18 or older       Passed - No active pregnancy on record       Passed - No positive pregnancy test in last 12 months       Passed - Recent (6 mo) or future (30 days) visit within the authorizing provider's specialty    Patient had office visit in the last 6 months or has a visit in the next 30 days with authorizing provider or within the authorizing provider's specialty.  See \"Patient Info\" tab in inbasket, or \"Choose Columns\" in Meds & Orders section of the refill encounter.              "

## 2018-03-22 NOTE — TELEPHONE ENCOUNTER
Prescription approved per [facility].    Per LOV note:      3. Moderate episode of recurrent major depressive disorder (H)  Stable.  Refilled.  - sertraline (ZOLOFT) 100 MG tablet; Take 1 tablet by mouth daily.  Dispense: 90 tablet; Refill: 1  - QUEtiapine (SEROQUEL) 100 MG tablet; Take 2 tablets (200 mg) by mouth At Bedtime  Dispense: 180 tablet; Refill: 1    Gracie TAYLOR RN, BSN, PHN  [facility] RN

## 2018-03-23 ENCOUNTER — DOCUMENTATION ONLY (OUTPATIENT)
Dept: FAMILY MEDICINE | Facility: CLINIC | Age: 33
End: 2018-03-23

## 2018-03-23 NOTE — PROGRESS NOTES
Recd records from [facility] 03/23/18 and forwarded to Alda VILLAFANA for review and scanning

## 2018-04-01 ENCOUNTER — DOCUMENTATION ONLY (OUTPATIENT)
Dept: FAMILY MEDICINE | Facility: CLINIC | Age: 33
End: 2018-04-01

## 2018-04-02 NOTE — PROGRESS NOTES
TC Pain clinic note scanned into chart.  Visit 3/21/18.  Discussed fibromyalgia, low back pain- radicular.  Increased gabapentin dose.  Scheduled for PT.  Waiting for records.  KYRIE

## 2018-04-13 ENCOUNTER — HOSPITAL ENCOUNTER (OUTPATIENT)
Dept: MRI IMAGING | Facility: CLINIC | Age: 33
Discharge: HOME | End: 2018-04-13
Attending: NURSE PRACTITIONER | Admitting: NURSE PRACTITIONER
Payer: COMMERCIAL

## 2018-04-13 DIAGNOSIS — M54.5 LOW BACK PAIN, UNSPECIFIED BACK PAIN LATERALITY, UNSPECIFIED CHRONICITY, WITH SCIATICA PRESENCE UNSPECIFIED: ICD-10-CM

## 2018-04-13 PROCEDURE — 72148 MRI LUMBAR SPINE W/O DYE: CPT

## 2018-04-18 ENCOUNTER — TRANSFERRED RECORDS (OUTPATIENT)
Dept: HEALTH INFORMATION MANAGEMENT | Facility: CLINIC | Age: 33
End: 2018-04-18

## 2018-04-19 RX ORDER — SUMATRIPTAN 100 MG/1
TABLET, FILM COATED ORAL
Qty: 9 TABLET | Refills: 0 | OUTPATIENT
Start: 2018-04-19

## 2018-04-26 ENCOUNTER — OFFICE VISIT (OUTPATIENT)
Dept: FAMILY MEDICINE | Facility: CLINIC | Age: 33
End: 2018-04-26
Payer: COMMERCIAL

## 2018-04-26 VITALS
DIASTOLIC BLOOD PRESSURE: 66 MMHG | HEART RATE: 100 BPM | SYSTOLIC BLOOD PRESSURE: 100 MMHG | TEMPERATURE: 98.1 F | RESPIRATION RATE: 18 BRPM | OXYGEN SATURATION: 97 % | WEIGHT: 155.4 LBS | BODY MASS INDEX: 26.26 KG/M2

## 2018-04-26 DIAGNOSIS — Z72.0 TOBACCO ABUSE: Primary | ICD-10-CM

## 2018-04-26 DIAGNOSIS — G43.009 MIGRAINE WITHOUT AURA AND WITHOUT STATUS MIGRAINOSUS, NOT INTRACTABLE: ICD-10-CM

## 2018-04-26 PROCEDURE — 99213 OFFICE O/P EST LOW 20 MIN: CPT | Performed by: NURSE PRACTITIONER

## 2018-04-26 RX ORDER — SUMATRIPTAN 100 MG/1
TABLET, FILM COATED ORAL
Qty: 30 TABLET | Status: CANCELLED | OUTPATIENT
Start: 2018-04-26

## 2018-04-26 RX ORDER — SUMATRIPTAN 100 MG/1
100 TABLET, FILM COATED ORAL
Qty: 9 TABLET | Refills: 1 | Status: SHIPPED | OUTPATIENT
Start: 2018-04-26 | End: 2018-06-06

## 2018-04-26 NOTE — PATIENT INSTRUCTIONS
I sent in chantix for you.  Let me know how this goes.  If we are going to continue this I will refill.    See me next week as planned for a physical.

## 2018-04-26 NOTE — MR AVS SNAPSHOT
After Visit Summary   4/26/2018    Jeannette Quesada    MRN: [id number]           Patient Information     Date Of Birth          1985        Visit Information        Provider Department      4/26/2018 1:40 PM Alda Mosquera Ra, APRN CNP [facility]        Today's Diagnoses     Tobacco abuse    -  1    Migraine without aura and without status migrainosus, not intractable          Care Instructions    I sent in chantix for you.  Let me know how this goes.  If we are going to continue this I will refill.    See me next week as planned for a physical.          Follow-ups after your visit        Follow-up notes from your care team     Return in about 1 week (around 5/3/2018) for Physical Exam.      Your next 10 appointments already scheduled     Apr 30, 2018  1:40 PM CDT   PHYSICAL with TONA Ford Ra, CNP   [facility]    [address] [facility] [address]   [phone]              Who to contact     If you have questions or need follow up information about today's clinic visit or your schedule please contact [facility] directly at [phone].  Normal or non-critical lab and imaging results will be communicated to you by MyChart, letter or phone within 4 business days after the clinic has received the results. If you do not hear from us within 7 days, please contact the clinic through MyChart or phone. If you have a critical or abnormal lab result, we will notify you by phone as soon as possible.  Submit refill requests through [employer] or call your pharmacy and they will forward the refill request to us. Please allow 3 business days for your refill to be completed.          Additional Information About Your Visit        MyChart Information     [employer] lets you send messages to your doctor, view your test results, renew your prescriptions, schedule appointments and more. To sign up, go to  "[email or web address] . Click on \"Log in\" on the left side of the screen, which will take you to the Welcome page. Then click on \"Sign up Now\" on the right side of the page.     You will be asked to enter the access code listed below, as well as some personal information. Please follow the directions to create your username and password.     Your access code is: [email or web address]  Expires: 2018  2:22 PM     Your access code will  in 90 days. If you need help or a new code, please call your [place] clinic or [phone].        Care EveryWhere ID     This is your Care EveryWhere ID. This could be used by other organizations to access your [place] medical records  [id number]        Your Vitals Were     Pulse Temperature Respirations Last Period Pulse Oximetry BMI (Body Mass Index)    100 98.1  F (36.7  C) (Oral) 18 2018 (Approximate) 97% 26.26 kg/m2       Blood Pressure from Last 3 Encounters:   18 100/66   18 124/80   01/10/18 102/68    Weight from Last 3 Encounters:   18 155 lb 6.4 oz (70.5 kg)   18 137 lb 8 oz (62.4 kg)   01/10/18 138 lb 6.4 oz (62.8 kg)              Today, you had the following     No orders found for display         Today's Medication Changes          These changes are accurate as of 18  2:22 PM.  If you have any questions, ask your nurse or doctor.               Start taking these medicines.        Dose/Directions    varenicline 0.5 MG X 11 & 1 MG X 42 tablet   Commonly known as:  CHANTIX STARTING MONTH    Used for:  Tobacco abuse   Started by:  Alda Mosquera Ra, TONA CNP        Take 0.5 mg tab daily for 3 days, then 0.5 mg tab twice daily for 4 days, then 1 mg twice daily.   Quantity:  53 tablet   Refills:  0         These medicines have changed or have updated prescriptions.        Dose/Directions    * SUMAtriptan 100 MG tablet   Commonly known as:  IMITREX   This may have changed:  Another medication with the same name was added. Make sure " you understand how and when to take each.   Changed by:  Alda Mosquera Ra, APRN CNP        TK 1 T PO PRN FOR MIGRAINE. MAY REPEAT AFTER 1-2 HOURS IF INEFFECTIVE. MAX 2 T PER 24 HOURS.   Refills:  0       * SUMAtriptan 100 MG tablet   Commonly known as:  IMITREX   This may have changed:  You were already taking a medication with the same name, and this prescription was added. Make sure you understand how and when to take each.   Used for:  Migraine without aura and without status migrainosus, not intractable   Changed by:  Alda Mosquera Ra, APRN CNP        Dose:  100 mg   Take 1 tablet (100 mg) by mouth at onset of headache for migraine May repeat in 2 hours. Max 2 tablets/24 hours.   Quantity:  9 tablet   Refills:  1       * Notice:  This list has 2 medication(s) that are the same as other medications prescribed for you. Read the directions carefully, and ask your doctor or other care provider to review them with you.         Where to get your medicines      These medications were sent to [facility] [address] [facility] [address] AT [place] [address] & [facility]  [address] [address] [facility] [address] [facility] [address]     Phone:  [phone]     SUMAtriptan 100 MG tablet    varenicline 0.5 MG X 11 & 1 MG X 42 tablet                Primary Care Provider Office Phone # Fax #    TONA Ford Ra [facility] [phone] [address] [facility] [address]        Equal Access to Services     [facility] HospitalMELISSA AH: Hadii aad ku hadasho Soomaali, waaxda luqadaha, qaybta kaalmada adeegyada, waxay alon hayyari smith. So [facility] [phone].    ATENCIÓN: Si habla español, tiene a modi disposición servicios gratuitos de asistencia lingüística. Llame al [phone].    We comply with applicable federal civil rights laws and [place] laws. We do not discriminate on the basis of race, color, national origin, age, disability, sex, sexual orientation, or gender identity.             Thank you!     Thank you for choosing [facility]  for your care. Our goal is always to provide you with excellent care. Hearing back from our patients is one way we can continue to improve our services. Please take a few minutes to complete the written survey that you may receive in the mail after your visit with us. Thank you!             Your Updated Medication List - Protect others around you: Learn how to safely use, store and throw away your medicines at www.disposemymeds.org.          This list is accurate as of 4/26/18  2:22 PM.  Always use your most recent med list.                   Brand Name Dispense Instructions for use Diagnosis    buPROPion 300 MG 24 hr tablet    WELLBUTRIN XL    90 tablet    TAKE 1 TABLET BY MOUTH EVERY DAY    Moderate episode of recurrent major depressive disorder (H)       gabapentin 300 MG capsule    NEURONTIN    90 capsule    Take 1 tablet (300 mg) every night for 1-3 days, then 1 tablet twice daily for 1-3 days, then 1 tablet three times daily    Myalgia       QUEtiapine 100 MG tablet    SEROquel    180 tablet    Take 2 tablets (200 mg) by mouth At Bedtime    Moderate episode of recurrent major depressive disorder (H)       sertraline 100 MG tablet    ZOLOFT    90 tablet    Take 1 tablet by mouth daily.    Moderate episode of recurrent major depressive disorder (H)       * SUMAtriptan 100 MG tablet    IMITREX     TK 1 T PO PRN FOR MIGRAINE. MAY REPEAT AFTER 1-2 HOURS IF INEFFECTIVE. MAX 2 T PER 24 HOURS.        * SUMAtriptan 100 MG tablet    IMITREX    9 tablet    Take 1 tablet (100 mg) by mouth at onset of headache for migraine May repeat in 2 hours. Max 2 tablets/24 hours.    Migraine without aura and without status migrainosus, not intractable       varenicline 0.5 MG X 11 & 1 MG X 42 tablet    CHANTIX STARTING MONTH [place]    53 tablet    Take 0.5 mg tab daily for 3 days, then 0.5 mg tab twice daily for 4 days, then 1 mg twice daily.    Tobacco abuse       *  Notice:  This list has 2 medication(s) that are the same as other medications prescribed for you. Read the directions carefully, and ask your doctor or other care provider to review them with you.

## 2018-04-26 NOTE — PROGRESS NOTES
SUBJECTIVE:   Jeannette Quesada is a 32 year old female who presents to clinic today for the following health issues:      Patient would like to discuss going on chantix.    Has not tried before.  Tried patch and lozenge before at different times without success.  Interested in quitting smoking.  Has not quit in the past aside from first pregnancy.  She smokes 1ppd.    Problem list and histories reviewed & adjusted, as indicated.  Additional history: as documented    Patient Active Problem List   Diagnosis     Migraine without aura and without status migrainosus, not intractable     Moderate episode of recurrent major depressive disorder (H)     CARDIOVASCULAR SCREENING; LDL GOAL LESS THAN 160     Fibromyalgia     Past Surgical History:   Procedure Laterality Date      SECTION       CHOLECYSTECTOMY, LAPOROSCOPIC       TUBAL LIGATION         Social History   Substance Use Topics     Smoking status: Current Every Day Smoker     Packs/day: 1.00     Types: Cigarettes     Smokeless tobacco: Never Used      Comment: 1 pack every other day     Alcohol use Yes      Comment: Rare     Family History   Problem Relation Age of Onset     Family History Negative Mother      Family History Negative Father            Reviewed and updated as needed this visit by clinical staff       Reviewed and updated as needed this visit by Provider         ROS:  SEE HPI.    OBJECTIVE:     /66 (BP Location: Right arm, Patient Position: Chair, Cuff Size: Adult Regular)  Pulse 100  Temp 98.1  F (36.7  C) (Oral)  Resp 18  Wt 155 lb 6.4 oz (70.5 kg)  LMP 2018 (Approximate)  SpO2 97%  BMI 26.26 kg/m2  Body mass index is 26.26 kg/(m^2).  GENERAL: healthy, alert and no distress  PSYCH: mentation appears normal, affect normal/bright    Diagnostic Test Results:  none     ASSESSMENT/PLAN:   1. Tobacco abuse  Discussed options.  Monitor mental health.  Request refill after 1 month if doing well.  Pt agrees with plan and  verbalized understanding.  - varenicline (CHANTIX STARTING MONTH GARY) 0.5 MG X 11 & 1 MG X 42 tablet; Take 0.5 mg tab daily for 3 days, then 0.5 mg tab twice daily for 4 days, then 1 mg twice daily.  Dispense: 53 tablet; Refill: 0    2. Migraine without aura and without status migrainosus, not intractable  Stable.    - SUMAtriptan (IMITREX) 100 MG tablet; Take 1 tablet (100 mg) by mouth at onset of headache for migraine May repeat in 2 hours. Max 2 tablets/24 hours.  Dispense: 9 tablet; Refill: 1    TONA Ford Ra [facility]

## 2018-05-03 ENCOUNTER — DOCUMENTATION ONLY (OUTPATIENT)
Dept: FAMILY MEDICINE | Facility: CLINIC | Age: 33
End: 2018-05-03

## 2018-05-25 DIAGNOSIS — Z72.0 TOBACCO ABUSE: ICD-10-CM

## 2018-05-28 NOTE — TELEPHONE ENCOUNTER
"Requested Prescriptions   Pending Prescriptions Disp Refills     varenicline (CHANTIX STARTING MONTH GARY) 0.5 MG X 11 & 1 MG X 42 tablet  Last Written Prescription Date:  04/26/2018  Last Fill Quantity: 53 tablet,  # refills: 0   Last office visit: 4/26/2018 with prescribing provider:  Alda Mosquera Ra, APRN CNP    Future Office Visit:   Next 5 appointments (look out 90 days)     Jun 01, 2018  9:20 AM CDT   PHYSICAL with TONA Ford Ra, CNP   [facility]    [address] [place] [address] [phone]                  53 tablet 0     Sig: Take 0.5 mg tab daily for 3 days, then 0.5 mg tab twice daily for 4 days, then 1 mg twice daily.    Partial Cholinergic Nicotinic Agonist Agents Passed    5/25/2018  4:15 PM       Passed - Blood pressure under 140/90 in past 12 months    BP Readings from Last 3 Encounters:   04/26/18 100/66   02/16/18 124/80   01/10/18 102/68                Passed - Recent (12 mo) or future (30 days) visit within the authorizing provider's specialty    Patient had office visit in the last 12 months or has a visit in the next 30 days with authorizing provider or within the authorizing provider's specialty.  See \"Patient Info\" tab in inbasket, or \"Choose Columns\" in Meds & Orders section of the refill encounter.           Passed - Patient is 18 years of age or older       Passed - Patient is not pregnant       Passed - No positive pregnancy test on file in past 12 months          "

## 2018-05-30 DIAGNOSIS — F33.1 MODERATE EPISODE OF RECURRENT MAJOR DEPRESSIVE DISORDER (H): ICD-10-CM

## 2018-05-31 DIAGNOSIS — M79.10 MYALGIA: ICD-10-CM

## 2018-05-31 NOTE — TELEPHONE ENCOUNTER
Requested Prescriptions   Pending Prescriptions Disp Refills     gabapentin (NEURONTIN) 300 MG capsule [Pharmacy Med Name: GABAPENTIN 300MG CAPSULES] 90 capsule 0     Sig: TAKE 1 CAPSULE BY MOUTH EVERY EVENING FOR 1-3 DAYS, THEN 1 CAPSULE TWICE DAILY FOR 1-3 DAYS, THEN 1 THREE TIMES DAILY    There is no refill protocol information for this order        Last Written Prescription Date:  2/16/18  Last Fill Quantity: 90,  # refills: 0   Last office visit: 4/26/2018 with prescribing provider:  4/26/2018     Future Office Visit:   Next 5 appointments (look out 90 days)     Jun 01, 2018  9:20 AM CDT   PHYSICAL with TONA Ford Ra, CNP   [facility]    [address] [address]   [phone]

## 2018-05-31 NOTE — TELEPHONE ENCOUNTER
Requested Prescriptions   Pending Prescriptions Disp Refills     QUEtiapine (SEROQUEL) 100 MG tablet [Pharmacy Med Name: QUETIAPINE 100MG TABLETS]  Last Written Prescription Date:  1/10/2018  Last Fill Quantity: 180 tablet,  # refills: 1   Last office visit: 4/26/2018 with prescribing provider:  Alda Mosquera Ra   Future Office Visit:   Next 5 appointments (look out 90 days)     Jun 01, 2018  9:20 AM CDT   PHYSICAL with TONA Ford Ra, CNP   [facility] [place]    [address] [address] [phone]                180 tablet 0     Sig: TAKE 2 TABLETS(200 MG) BY MOUTH AT BEDTIME    Antipsychotic Medications Failed    5/30/2018  5:07 PM       Failed - Lipid panel on file within the past 12 months    No lab results found.           Passed - Blood pressure under 140/90 in past 12 months    BP Readings from Last 3 Encounters:   04/26/18 100/66   02/16/18 124/80   01/10/18 102/68                Passed - Patient is 12 years of age or older       Passed - CBC on file in past 12 months    Recent Labs   Lab Test  09/22/17   0923   WBC  5.9   RBC  4.43   HGB  12.9   HCT  39.9   PLT  251       For [facility] ONLY: SCDO294 = WBC, ASUA046 = RBC         Passed - Heart Rate on file within past 12 months    Pulse Readings from Last 3 Encounters:   04/26/18 100   02/16/18 110   01/10/18 96              Passed - A1c or Glucose on file in past 12 months    Recent Labs   Lab Test  09/22/17   0923   GLC  96       Please review patients last 3 weights. If a weight gain of >10 lbs exists, you may refill the prescription once after instructing the patient to schedule an appointment within the next 30 days.    Wt Readings from Last 3 Encounters:   04/26/18 155 lb 6.4 oz (70.5 kg)   02/16/18 137 lb 8 oz (62.4 kg)   01/10/18 138 lb 6.4 oz (62.8 kg)            Passed - Patient is not pregnant       Passed - No positve pregnancy test on file in past 12 months       Passed -  "Recent (6 mo) or future (30 days) visit within the authorizing provider's specialty    Patient had office visit in the last 6 months or has a visit in the next 30 days with authorizing provider or within the authorizing provider's specialty.  See \"Patient Info\" tab in inbasket, or \"Choose Columns\" in Meds & Orders section of the refill encounter.              "

## 2018-06-01 ENCOUNTER — OFFICE VISIT (OUTPATIENT)
Dept: FAMILY MEDICINE | Facility: CLINIC | Age: 33
End: 2018-06-01
Payer: COMMERCIAL

## 2018-06-01 VITALS
DIASTOLIC BLOOD PRESSURE: 70 MMHG | WEIGHT: 150.3 LBS | RESPIRATION RATE: 16 BRPM | BODY MASS INDEX: 25.4 KG/M2 | TEMPERATURE: 97.9 F | HEART RATE: 104 BPM | OXYGEN SATURATION: 98 % | SYSTOLIC BLOOD PRESSURE: 104 MMHG

## 2018-06-01 DIAGNOSIS — Z00.00 ENCOUNTER FOR ROUTINE ADULT HEALTH EXAMINATION WITHOUT ABNORMAL FINDINGS: Primary | ICD-10-CM

## 2018-06-01 DIAGNOSIS — F33.1 MODERATE EPISODE OF RECURRENT MAJOR DEPRESSIVE DISORDER (H): ICD-10-CM

## 2018-06-01 DIAGNOSIS — N92.0 EXCESSIVE OR FREQUENT MENSTRUATION: ICD-10-CM

## 2018-06-01 DIAGNOSIS — Z72.0 TOBACCO ABUSE: ICD-10-CM

## 2018-06-01 PROCEDURE — 99395 PREV VISIT EST AGE 18-39: CPT | Performed by: NURSE PRACTITIONER

## 2018-06-01 PROCEDURE — G0145 SCR C/V CYTO,THINLAYER,RESCR: HCPCS | Performed by: NURSE PRACTITIONER

## 2018-06-01 PROCEDURE — 87624 HPV HI-RISK TYP POOLED RSLT: CPT | Performed by: NURSE PRACTITIONER

## 2018-06-01 RX ORDER — VARENICLINE TARTRATE 1 MG/1
1 TABLET, FILM COATED ORAL 2 TIMES DAILY
Qty: 56 TABLET | Refills: 2 | Status: SHIPPED | OUTPATIENT
Start: 2018-06-01 | End: 2018-06-27

## 2018-06-01 RX ORDER — SERTRALINE HYDROCHLORIDE 100 MG/1
TABLET, FILM COATED ORAL
Qty: 90 TABLET | Refills: 1 | Status: SHIPPED | OUTPATIENT
Start: 2018-06-01 | End: 2018-09-14

## 2018-06-01 RX ORDER — BUPROPION HYDROCHLORIDE 300 MG/1
300 TABLET ORAL DAILY
Qty: 90 TABLET | Refills: 0 | Status: SHIPPED | OUTPATIENT
Start: 2018-06-01 | End: 2018-08-31

## 2018-06-01 ASSESSMENT — ENCOUNTER SYMPTOMS
HEARTBURN: 0
SHORTNESS OF BREATH: 1
ABDOMINAL PAIN: 1
HEMATURIA: 0
DIARRHEA: 0
MYALGIAS: 1
CHILLS: 1
FEVER: 0
NAUSEA: 1
HEADACHES: 1
WEAKNESS: 1
EYE PAIN: 1
DIZZINESS: 1
PARESTHESIAS: 0
NERVOUS/ANXIOUS: 0
PALPITATIONS: 0
JOINT SWELLING: 1
SHORTNESS OF BREATH: 0
HEMATOCHEZIA: 0
COUGH: 0
CONSTIPATION: 0
SORE THROAT: 0
FREQUENCY: 0
ARTHRALGIAS: 1
DYSURIA: 0

## 2018-06-01 NOTE — LETTER
June 11, 2018    Jeannette Quesada  [address]  [place] [address]    Dear Jeannette,  We are happy to inform you that your PAP smear result from 06/01/18 is normal.  We are now able to do a follow up test on PAP smears. The DNA test is for HPV (Human Papilloma Virus). Cervical cancer is closely linked with certain types of HPV. Your results showed no evidence of high risk HPV.  Therefore we recommend you return in 5 years for your next pap smear and HPV test.  You will still need to return to the clinic every year for an annual exam and other preventive tests.  Please contact the clinic at [phone] with any questions.  Sincerely,    TONA Ford Ra CNP/es

## 2018-06-01 NOTE — MR AVS SNAPSHOT
After Visit Summary   6/1/2018    Jeannette Quesada    MRN: [id number]           Patient Information     Date Of Birth          1985        Visit Information        Provider Department      6/1/2018 9:20 AM Alda Mosquera Ra, APRN CNP [facility]        Today's Diagnoses     Encounter for routine adult health examination without abnormal findings    -  1    Moderate episode of recurrent major depressive disorder (H)        Tobacco abuse        Excessive or frequent menstruation          Care Instructions      Preventive Health Recommendations  Female Ages 26 - 39  Yearly exam:   See your health care provider every year in order to    Review health changes.     Discuss preventive care.      Review your medicines if you your doctor has prescribed any.    Until age 30: Get a Pap test every three years (more often if you have had an abnormal result).    After age 30: Talk to your doctor about whether you should have a Pap test every 3 years or have a Pap test with HPV screening every 5 years.   You do not need a Pap test if your uterus was removed (hysterectomy) and you have not had cancer.  You should be tested each year for STDs (sexually transmitted diseases), if you're at risk.   Talk to your provider about how often to have your cholesterol checked.  If you are at risk for diabetes, you should have a diabetes test (fasting glucose).  Shots: Get a flu shot each year. Get a tetanus shot every 10 years.   Nutrition:     Eat at least 5 servings of fruits and vegetables each day.    Eat whole-grain bread, whole-wheat pasta and brown rice instead of white grains and rice.    Talk to your provider about Calcium and Vitamin D.     Lifestyle    Exercise at least 150 minutes a week (30 minutes a day, 5 days of the week). This will help you control your weight and prevent disease.    Limit alcohol to one drink per day.    No smoking.     Wear sunscreen to prevent skin cancer.    See your  dentist every six months for an exam and cleaning.            Follow-ups after your visit        Additional Services     OB/GYN REFERRAL       Your provider has referred you to:  N: [facility] [phone]   [email or web address]  N: [facility] [place] [phone]   [email or web address]    Please be aware that coverage of these services is subject to the terms and limitations of your health insurance plan.  Call member services at your health plan with any benefit or coverage questions.      Please bring the following with you to your appointment:    (1) Any X-Rays, CTs or MRIs which have been performed.  Contact the facility where they were done to arrange for  prior to your scheduled appointment.   (2) List of current medications   (3) This referral request   (4) Any documents/labs given to you for this referral                  Follow-up notes from your care team     Return in about 6 months (around 12/1/2018) for follow up.      Future tests that were ordered for you today     Open Future Orders        Priority Expected Expires Ordered    **TSH with free T4 reflex FUTURE anytime Routine 6/1/2018 6/1/2019 6/1/2018    **Basic metabolic panel FUTURE anytime Routine 6/1/2018 6/1/2019 6/1/2018    Lipid panel reflex to direct LDL Fasting Routine  6/1/2019 6/1/2018            Who to contact     If you have questions or need follow up information about today's clinic visit or your schedule please contact [facility] directly at [phone].  Normal or non-critical lab and imaging results will be communicated to you by MyChart, letter or phone within 4 business days after the clinic has received the results. If you do not hear from us within 7 days, please contact the clinic through MyChart or phone. If you have a critical or abnormal lab result, we will notify you by phone as soon as possible.  Submit refill requests through [facility] or call your  "pharmacy and they will forward the refill request to us. Please allow 3 business days for your refill to be completed.          Additional Information About Your Visit        MyChart Information     [facility] lets you send messages to your doctor, view your test results, renew your prescriptions, schedule appointments and more. To sign up, go to [email or web address] . Click on \"Log in\" on the left side of the screen, which will take you to the Welcome page. Then click on \"Sign up Now\" on the right side of the page.     You will be asked to enter the access code listed below, as well as some personal information. Please follow the directions to create your username and password.     Your access code is: [email or web address]  Expires: 2018  2:22 PM     Your access code will  in 90 days. If you need help or a new code, please call your [place] clinic or [phone].        Care EveryWhere ID     This is your Care EveryWhere ID. This could be used by other organizations to access your [place] medical records  [id number]        Your Vitals Were     Pulse Temperature Respirations Last Period Pulse Oximetry BMI (Body Mass Index)    104 97.9  F (36.6  C) (Oral) 16 2018 (Approximate) 98% 25.4 kg/m2       Blood Pressure from Last 3 Encounters:   18 104/70   18 100/66   18 124/80    Weight from Last 3 Encounters:   18 150 lb 4.8 oz (68.2 kg)   18 155 lb 6.4 oz (70.5 kg)   18 137 lb 8 oz (62.4 kg)              We Performed the Following     HPV High Risk Types DNA Cervical     OB/GYN REFERRAL     Pap imaged thin layer screen with HPV - recommended age 30 - 65          Today's Medication Changes          These changes are accurate as of 18 10:02 AM.  If you have any questions, ask your nurse or doctor.               These medicines have changed or have updated prescriptions.        Dose/Directions    buPROPion 300 MG 24 hr tablet   Commonly known as:  WELLBUTRIN XL   This " may have changed:  See the new instructions.   Used for:  Moderate episode of recurrent major depressive disorder (H)   Changed by:  Alda Mosquera Ra, APRN CNP        Dose:  300 mg   Take 1 tablet (300 mg) by mouth daily   Quantity:  90 tablet   Refills:  0       * varenicline 0.5 MG X 11 & 1 MG X 42 tablet   Commonly known as:  CHANTIX STARTING MONTH PAK   This may have changed:  Another medication with the same name was added. Make sure you understand how and when to take each.   Used for:  Tobacco abuse   Changed by:  Alda Mosquera Ra, APRN CNP        Take 0.5 mg tab daily for 3 days, then 0.5 mg tab twice daily for 4 days, then 1 mg twice daily.   Quantity:  53 tablet   Refills:  0       * varenicline 1 MG tablet   Commonly known as:  CHANTIX   This may have changed:  You were already taking a medication with the same name, and this prescription was added. Make sure you understand how and when to take each.   Used for:  Tobacco abuse   Changed by:  Alda Mosquera Ra, APRN CNP        Dose:  1 mg   Take 1 tablet (1 mg) by mouth 2 times daily   Quantity:  56 tablet   Refills:  2       * Notice:  This list has 2 medication(s) that are the same as other medications prescribed for you. Read the directions carefully, and ask your doctor or other care provider to review them with you.         Where to get your medicines      These medications were sent to [facility] [address] [place] [address] & [facility]  [address] [address]     Phone:  [phone]     buPROPion 300 MG 24 hr tablet    sertraline 100 MG tablet    varenicline 0.5 MG X 11 & 1 MG X 42 tablet    varenicline 1 MG tablet                Primary Care Provider Office Phone # Fax #    TONA Ford Ra, [phone] [address]  [facility] [address]        Equal Access to Services     PEPPER VASQUEZ AH: erin Johnson,  teresa liriano ah. So [facility] [phone].    ATENCIÓN: Si julian marks, tiene a modi disposición servicios gratuitos de asistencia lingüística. Hunter al [phone].    We comply with applicable federal civil rights laws and [place] laws. We do not discriminate on the basis of race, color, national origin, age, disability, sex, sexual orientation, or gender identity.            Thank you!     Thank you for choosing [facility]  for your care. Our goal is always to provide you with excellent care. Hearing back from our patients is one way we can continue to improve our services. Please take a few minutes to complete the written survey that you may receive in the mail after your visit with us. Thank you!             Your Updated Medication List - Protect others around you: Learn how to safely use, store and throw away your medicines at www.disposemymeds.org.          This list is accurate as of 6/1/18 10:02 AM.  Always use your most recent med list.                   Brand Name Dispense Instructions for use Diagnosis    buPROPion 300 MG 24 hr tablet    WELLBUTRIN XL    90 tablet    Take 1 tablet (300 mg) by mouth daily    Moderate episode of recurrent major depressive disorder (H)       gabapentin 300 MG capsule    NEURONTIN    90 capsule    Take 1 tablet (300 mg) every night for 1-3 days, then 1 tablet twice daily for 1-3 days, then 1 tablet three times daily    Myalgia       QUEtiapine 100 MG tablet    SEROquel    180 tablet    Take 2 tablets (200 mg) by mouth At Bedtime    Moderate episode of recurrent major depressive disorder (H)       sertraline 100 MG tablet    ZOLOFT    90 tablet    Take 1 tablet by mouth daily.    Moderate episode of recurrent major depressive disorder (H)       * SUMAtriptan 100 MG tablet    IMITREX     TK 1 T PO PRN FOR MIGRAINE. MAY REPEAT AFTER 1-2 HOURS IF INEFFECTIVE. MAX 2 T PER 24 HOURS.        * SUMAtriptan 100 MG  tablet    IMITREX    9 tablet    Take 1 tablet (100 mg) by mouth at onset of headache for migraine May repeat in 2 hours. Max 2 tablets/24 hours.    Migraine without aura and without status migrainosus, not intractable       * varenicline 0.5 MG X 11 & 1 MG X 42 tablet    CHANTIX STARTING MONTH GARY    53 tablet    Take 0.5 mg tab daily for 3 days, then 0.5 mg tab twice daily for 4 days, then 1 mg twice daily.    Tobacco abuse       * varenicline 1 MG tablet    CHANTIX    56 tablet    Take 1 tablet (1 mg) by mouth 2 times daily    Tobacco abuse       * Notice:  This list has 4 medication(s) that are the same as other medications prescribed for you. Read the directions carefully, and ask your doctor or other care provider to review them with you.

## 2018-06-01 NOTE — PROGRESS NOTES
SUBJECTIVE:   CC: Jeannette Quesada is an 32 year old woman who presents for preventive health visit.     Physical   Annual:     Getting at least 3 servings of Calcium per day::  NO    Bi-annual eye exam::  Yes    Dental care twice a year::  NO    Sleep apnea or symptoms of sleep apnea::  None    Diet::  Regular (no restrictions)    Frequency of exercise::  1 day/week    Duration of exercise::  Less than 15 minutes    Taking medications regularly::  Yes    Medication side effects::  Not applicable    Additional concerns today::  No                    Today's PHQ-2 Score: No flowsheet data found.    Abuse: Current or Past(Physical, Sexual or Emotional)- No  Do you feel safe in your environment - Yes    Social History   Substance Use Topics     Smoking status: Current Every Day Smoker     Packs/day: 1.00     Types: Cigarettes     Smokeless tobacco: Never Used      Comment: 1 pack every other day     Alcohol use Yes      Comment: Rare     No flowsheet data found.    Reviewed orders with patient.  Reviewed health maintenance and updated orders accordingly - Yes  Patient Active Problem List   Diagnosis     Migraine without aura and without status migrainosus, not intractable     Moderate episode of recurrent major depressive disorder (H)     CARDIOVASCULAR SCREENING; LDL GOAL LESS THAN 160     Fibromyalgia     Past Surgical History:   Procedure Laterality Date      SECTION       CHOLECYSTECTOMY, LAPOROSCOPIC       TUBAL LIGATION         Social History   Substance Use Topics     Smoking status: Current Every Day Smoker     Packs/day: 1.00     Types: Cigarettes     Smokeless tobacco: Never Used      Comment: 1 pack every other day     Alcohol use Yes      Comment: Rare     Family History   Problem Relation Age of Onset     Family History Negative Mother      Family History Negative Father            Mammogram not appropriate for this patient based on age.    Pertinent mammograms are reviewed under the imaging  tab.  History of abnormal Pap smear: NO - age 30-65 PAP every 5 years with negative HPV co-testing recommended    Reviewed and updated as needed this visit by clinical staff         Reviewed and updated as needed this visit by Provider            Review of Systems   Constitutional: Positive for chills. Negative for fever.   HENT: Positive for congestion. Negative for ear pain, hearing loss and sore throat.    Eyes: Positive for pain. Negative for visual disturbance.   Respiratory: Negative for cough and shortness of breath.    Cardiovascular: Negative for chest pain, palpitations and peripheral edema.   Gastrointestinal: Positive for abdominal pain and nausea. Negative for constipation, diarrhea, heartburn and hematochezia.   Genitourinary: Positive for menstrual problem (Menorrhagia for the past year.  Regular frequency.  S/p tubal ligation.). Negative for dysuria, frequency, genital sores, hematuria, pelvic pain, urgency, vaginal bleeding and vaginal discharge.   Musculoskeletal: Positive for arthralgias (Working with [facility].  Gabapentin, zanaflex.  Considering trigger point injections.), joint swelling and myalgias.   Skin: Negative for rash.   Neurological: Positive for dizziness, weakness and headaches. Negative for paresthesias.   Psychiatric/Behavioral: Positive for mood changes (Feeling stable on current regimen.  No change with start of chantix for smoking cessation). The patient is not nervous/anxious.           OBJECTIVE:   There were no vitals taken for this visit.  Physical Exam  GENERAL: healthy, alert and no distress  EYES: Eyes grossly normal to inspection, PERRL and conjunctivae and sclerae normal  HENT: ear canals and TM's normal, nose and mouth without ulcers or lesions  NECK: no adenopathy, no asymmetry, masses, or scars and thyroid normal to palpation  RESP: lungs clear to auscultation - no rales, rhonchi or wheezes  BREAST: normal without masses, tenderness or nipple discharge and no  palpable axillary masses or adenopathy  CV: regular rate and rhythm, normal S1 S2, no S3 or S4, no murmur, click or rub, no peripheral edema and peripheral pulses strong  ABDOMEN: soft, nontender, no hepatosplenomegaly, no masses and bowel sounds normal   (female): normal female external genitalia, normal urethral meatus , vaginal mucosa pink, moist, well rugated and normal cervix  NEURO: Normal strength and tone, mentation intact and speech normal  PSYCH: mentation appears normal, affect normal/bright    ASSESSMENT/PLAN:   1. Encounter for routine adult health examination without abnormal findings  Return to clinic for fasting labs.  Pap today.  - Pap imaged thin layer screen with HPV - recommended age 30 - 65  - HPV High Risk Types DNA Cervical  - Lipid panel reflex to direct LDL Fasting; Future  - **TSH with free T4 reflex FUTURE anytime; Future  - **Basic metabolic panel FUTURE anytime; Future    2. Moderate episode of recurrent major depressive disorder (H)  Stable.  Refilled.  - buPROPion (WELLBUTRIN XL) 300 MG 24 hr tablet; Take 1 tablet (300 mg) by mouth daily  Dispense: 90 tablet; Refill: 0  - sertraline (ZOLOFT) 100 MG tablet; Take 1 tablet by mouth daily.  Dispense: 90 tablet; Refill: 1    3. Tobacco abuse  Doing well with chantix.  Out of med x2 days.  Restart now.    - varenicline (CHANTIX STARTING MONTH GARY) 0.5 MG X 11 & 1 MG X 42 tablet; Take 0.5 mg tab daily for 3 days, then 0.5 mg tab twice daily for 4 days, then 1 mg twice daily.  Dispense: 53 tablet; Refill: 0  - varenicline (CHANTIX) 1 MG tablet; Take 1 tablet (1 mg) by mouth 2 times daily  Dispense: 56 tablet; Refill: 2    4. Excessive or frequent menstruation  - OB/GYN REFERRAL    COUNSELING:  Reviewed preventive health counseling, as reflected in patient instructions         reports that she has been smoking Cigarettes.  She has been smoking about 1.00 pack per day. She has never used smokeless tobacco.  Tobacco Cessation Action Plan:  "Pharmacotherapies : Chantix  Estimated body mass index is 26.26 kg/(m^2) as calculated from the following:    Height as of 2/16/18: 5' 4.5\" (1.638 m).    Weight as of 4/26/18: 155 lb 6.4 oz (70.5 kg).       Counseling Resources:  ATP IV Guidelines  Pooled Cohorts Equation Calculator  Breast Cancer Risk Calculator  FRAX Risk Assessment  ICSI Preventive Guidelines  Dietary Guidelines for Americans, 2010  USDA's MyPlate  ASA Prophylaxis  Lung CA Screening    TONA Ford [facility] [place]  Answers for HPI/ROS submitted by the patient on 6/1/2018   PHQ-2 Score: 0    "

## 2018-06-05 LAB
COPATH REPORT: NORMAL
PAP: NORMAL

## 2018-06-05 RX ORDER — QUETIAPINE FUMARATE 100 MG/1
TABLET, FILM COATED ORAL
Qty: 60 TABLET | Refills: 0 | Status: SHIPPED | OUTPATIENT
Start: 2018-06-05 | End: 2018-07-03

## 2018-06-05 NOTE — TELEPHONE ENCOUNTER
Prescription refilled x 1 per [facility] [place].    Patient was seen on 6/1/18, future orders for lipid panel placed.

## 2018-06-06 DIAGNOSIS — G43.009 MIGRAINE WITHOUT AURA AND WITHOUT STATUS MIGRAINOSUS, NOT INTRACTABLE: ICD-10-CM

## 2018-06-06 NOTE — TELEPHONE ENCOUNTER
"Requested Prescriptions   Pending Prescriptions Disp Refills     SUMAtriptan (IMITREX) 100 MG tablet [Pharmacy Med Name: SUMATRIPTAN 100MG TABLETS]  Last Written Prescription Date:  4/26/18  Last Fill Quantity: 9 TABLET,  # refills: 1   Last office visit: 6/1/2018 with prescribing provider:  MALINI   Future Office Visit:     9 tablet 0     Sig: TAKE 1 TABLET(100 MG) BY MOUTH AT ONSET OF HEADACHE FOR MIGRAINE. MAY REPEAT IN 2 HOURS. MAX 2 TABLETS/ 24 HOURS    Serotonin Agonists Failed    6/6/2018  3:17 AM       Failed - Serotonin Agonist request needs review.    Please review patient's record. If patient has had 8 or more treatments in the past month, please forward to provider.         Passed - Blood pressure under 140/90 in past 12 months    BP Readings from Last 3 Encounters:   06/01/18 104/70   04/26/18 100/66   02/16/18 124/80                Passed - Recent (12 mo) or future (30 days) visit within the authorizing provider's specialty    Patient had office visit in the last 12 months or has a visit in the next 30 days with authorizing provider or within the authorizing provider's specialty.  See \"Patient Info\" tab in inbasket, or \"Choose Columns\" in Meds & Orders section of the refill encounter.           Passed - Patient is age 18 or older       Passed - No active pregnancy on record       Passed - No positive pregnancy test in past 12 months        SUMAtriptan (IMITREX) 50 MG tablet [Pharmacy Med Name: SUMATRIPTAN 50MG TABLETS]  DISCONTINUED 4/26/18.  Last Written Prescription Date:  1/10/18  Last Fill Quantity: 9 TABLET,  # refills: 3   Last office visit: 6/1/2018 with prescribing provider:  MALINI   Future Office Visit:     9 tablet 0     Sig: TAKE 1 TABLET(50 MG) BY MOUTH AT ONSET OF HEADACHE FOR MIGRAINE. MAY REPEAT IN 1-2 HOURS    Serotonin Agonists Failed    6/6/2018  3:17 AM       Failed - Serotonin Agonist request needs review.    Please review patient's record. If patient has had 8 or more " "treatments in the past month, please forward to provider.         Passed - Blood pressure under 140/90 in past 12 months    BP Readings from Last 3 Encounters:   06/01/18 104/70   04/26/18 100/66   02/16/18 124/80                Passed - Recent (12 mo) or future (30 days) visit within the authorizing provider's specialty    Patient had office visit in the last 12 months or has a visit in the next 30 days with authorizing provider or within the authorizing provider's specialty.  See \"Patient Info\" tab in inbasket, or \"Choose Columns\" in Meds & Orders section of the refill encounter.           Passed - Patient is age 18 or older       Passed - No active pregnancy on record       Passed - No positive pregnancy test in past 12 months          "

## 2018-06-06 NOTE — TELEPHONE ENCOUNTER
This is being managed by the pain clinic.  Pt was going to call them as there was a dose change made by them.  KYRIE

## 2018-06-07 LAB
FINAL DIAGNOSIS: NORMAL
HPV HR 12 DNA CVX QL NAA+PROBE: NEGATIVE
HPV16 DNA SPEC QL NAA+PROBE: NEGATIVE
HPV18 DNA SPEC QL NAA+PROBE: NEGATIVE
SPECIMEN DESCRIPTION: NORMAL
SPECIMEN SOURCE CVX/VAG CYTO: NORMAL

## 2018-06-07 RX ORDER — GABAPENTIN 300 MG/1
CAPSULE ORAL
Qty: 90 CAPSULE | Refills: 0
Start: 2018-06-07

## 2018-06-11 RX ORDER — SUMATRIPTAN 50 MG/1
TABLET, FILM COATED ORAL
Qty: 9 TABLET | Refills: 0 | OUTPATIENT
Start: 2018-06-11

## 2018-06-11 RX ORDER — SUMATRIPTAN 100 MG/1
TABLET, FILM COATED ORAL
Qty: 9 TABLET | Refills: 0 | Status: SHIPPED | OUTPATIENT
Start: 2018-06-11 | End: 2018-08-20

## 2018-06-11 NOTE — TELEPHONE ENCOUNTER
Prescription approved per [facility] Refill Protocol.  For 100 as 50 was discontinued at ov.  Tanner Rahman RN, BSN

## 2018-06-13 ENCOUNTER — TRANSFERRED RECORDS (OUTPATIENT)
Dept: HEALTH INFORMATION MANAGEMENT | Facility: CLINIC | Age: 33
End: 2018-06-13

## 2018-06-20 RX ORDER — TIZANIDINE 2 MG/1
TABLET ORAL
Refills: 1 | COMMUNITY
Start: 2018-05-31 | End: 2018-06-27

## 2018-06-21 DIAGNOSIS — F33.1 MODERATE EPISODE OF RECURRENT MAJOR DEPRESSIVE DISORDER (H): ICD-10-CM

## 2018-06-21 RX ORDER — SERTRALINE HYDROCHLORIDE 100 MG/1
TABLET, FILM COATED ORAL
Qty: 90 TABLET | Refills: 0 | OUTPATIENT
Start: 2018-06-21

## 2018-06-21 NOTE — TELEPHONE ENCOUNTER
"Requested Prescriptions   Pending Prescriptions Disp Refills     sertraline (ZOLOFT) 100 MG tablet [Pharmacy Med Name: SERTRALINE 100MG TABLETS]  Last Written Prescription Date:  6/1/18  Last Fill Quantity: 90,  # refills: 1   Last office visit: 6/1/2018 with prescribing provider:  6/1/2018     Future Office Visit:     90 tablet 0     Sig: TAKE 1 TABLET BY MOUTH DAILY    SSRIs Protocol Failed    6/21/2018  3:19 AM       Failed - PHQ-9 score less than 5 in past 6 months    Please review last PHQ-9 score.   PHQ-9 SCORE 10/12/2017 1/10/2018 2/16/2018   Total Score 6 10 24     JOEY-7 SCORE 10/12/2017 1/10/2018 2/16/2018   Total Score 5 5 21              Passed - Patient is age 18 or older       Passed - No active pregnancy on record       Passed - No positive pregnancy test in last 12 months       Passed - Recent (6 mo) or future (30 days) visit within the authorizing provider's specialty    Patient had office visit in the last 6 months or has a visit in the next 30 days with authorizing provider or within the authorizing provider's specialty.  See \"Patient Info\" tab in inbasket, or \"Choose Columns\" in Meds & Orders section of the refill encounter.              "

## 2018-06-27 ENCOUNTER — TELEPHONE (OUTPATIENT)
Dept: FAMILY MEDICINE | Facility: CLINIC | Age: 33
End: 2018-06-27

## 2018-06-27 ENCOUNTER — HOSPITAL ENCOUNTER (EMERGENCY)
Facility: CLINIC | Age: 33
Discharge: HOME | End: 2018-06-27
Attending: EMERGENCY MEDICINE | Admitting: EMERGENCY MEDICINE
Payer: COMMERCIAL

## 2018-06-27 VITALS
DIASTOLIC BLOOD PRESSURE: 60 MMHG | TEMPERATURE: 97.2 F | SYSTOLIC BLOOD PRESSURE: 109 MMHG | WEIGHT: 145 LBS | RESPIRATION RATE: 16 BRPM | HEIGHT: 62 IN | HEART RATE: 80 BPM | BODY MASS INDEX: 26.68 KG/M2 | OXYGEN SATURATION: 98 %

## 2018-06-27 DIAGNOSIS — M79.7 FIBROMYALGIA: ICD-10-CM

## 2018-06-27 DIAGNOSIS — F12.10 CANNABIS ABUSE: ICD-10-CM

## 2018-06-27 DIAGNOSIS — R11.2 NAUSEA AND VOMITING, INTRACTABILITY OF VOMITING NOT SPECIFIED, UNSPECIFIED VOMITING TYPE: ICD-10-CM

## 2018-06-27 LAB
ALBUMIN SERPL-MCNC: 4.6 G/DL (ref 3.4–5)
ALBUMIN UR-MCNC: 100 MG/DL
ALP SERPL-CCNC: 89 U/L (ref 40–150)
ALT SERPL W P-5'-P-CCNC: 16 U/L (ref 0–50)
ANION GAP SERPL CALCULATED.3IONS-SCNC: 12 MMOL/L (ref 3–14)
APPEARANCE UR: ABNORMAL
AST SERPL W P-5'-P-CCNC: 13 U/L (ref 0–45)
BACTERIA #/AREA URNS HPF: ABNORMAL /HPF
BILIRUB SERPL-MCNC: 1 MG/DL (ref 0.2–1.3)
BILIRUB UR QL STRIP: NEGATIVE
BUN SERPL-MCNC: 15 MG/DL (ref 7–30)
CALCIUM SERPL-MCNC: 9.1 MG/DL (ref 8.5–10.1)
CHLORIDE SERPL-SCNC: 103 MMOL/L (ref 94–109)
CO2 SERPL-SCNC: 20 MMOL/L (ref 20–32)
COLOR UR AUTO: YELLOW
CREAT SERPL-MCNC: 0.61 MG/DL (ref 0.52–1.04)
ERYTHROCYTE [DISTWIDTH] IN BLOOD BY AUTOMATED COUNT: 12.7 % (ref 10–15)
GFR SERPL CREATININE-BSD FRML MDRD: >90 ML/MIN/1.7M2
GLUCOSE SERPL-MCNC: 83 MG/DL (ref 70–99)
GLUCOSE UR STRIP-MCNC: NEGATIVE MG/DL
HCG SERPL QL: NEGATIVE
HCT VFR BLD AUTO: 40.1 % (ref 35–47)
HGB BLD-MCNC: 12.8 G/DL (ref 11.7–15.7)
HGB UR QL STRIP: NEGATIVE
HYALINE CASTS #/AREA URNS LPF: 2 /LPF (ref 0–2)
KETONES UR STRIP-MCNC: 80 MG/DL
LEUKOCYTE ESTERASE UR QL STRIP: ABNORMAL
LIPASE SERPL-CCNC: 46 U/L (ref 73–393)
MAGNESIUM SERPL-MCNC: 1.7 MG/DL (ref 1.6–2.3)
MCH RBC QN AUTO: 26.9 PG (ref 26.5–33)
MCHC RBC AUTO-ENTMCNC: 31.9 G/DL (ref 31.5–36.5)
MCV RBC AUTO: 84 FL (ref 78–100)
MUCOUS THREADS #/AREA URNS LPF: PRESENT /LPF
NITRATE UR QL: NEGATIVE
PH UR STRIP: 5 PH (ref 5–7)
PLATELET # BLD AUTO: 256 10E9/L (ref 150–450)
POTASSIUM SERPL-SCNC: 3.8 MMOL/L (ref 3.4–5.3)
PROT SERPL-MCNC: 8.2 G/DL (ref 6.8–8.8)
RBC # BLD AUTO: 4.75 10E12/L (ref 3.8–5.2)
RBC #/AREA URNS AUTO: 5 /HPF (ref 0–2)
SODIUM SERPL-SCNC: 135 MMOL/L (ref 133–144)
SOURCE: ABNORMAL
SP GR UR STRIP: 1.03 (ref 1–1.03)
SQUAMOUS #/AREA URNS AUTO: 12 /HPF (ref 0–1)
UROBILINOGEN UR STRIP-MCNC: 0 MG/DL (ref 0–2)
WBC # BLD AUTO: 8.6 10E9/L (ref 4–11)
WBC #/AREA URNS AUTO: 8 /HPF (ref 0–5)

## 2018-06-27 PROCEDURE — 25000128 H RX IP 250 OP 636: Performed by: EMERGENCY MEDICINE

## 2018-06-27 PROCEDURE — 83735 ASSAY OF MAGNESIUM: CPT | Performed by: EMERGENCY MEDICINE

## 2018-06-27 PROCEDURE — 83690 ASSAY OF LIPASE: CPT | Performed by: EMERGENCY MEDICINE

## 2018-06-27 PROCEDURE — 96374 THER/PROPH/DIAG INJ IV PUSH: CPT

## 2018-06-27 PROCEDURE — 80053 COMPREHEN METABOLIC PANEL: CPT | Performed by: EMERGENCY MEDICINE

## 2018-06-27 PROCEDURE — 96375 TX/PRO/DX INJ NEW DRUG ADDON: CPT

## 2018-06-27 PROCEDURE — 84703 CHORIONIC GONADOTROPIN ASSAY: CPT | Performed by: EMERGENCY MEDICINE

## 2018-06-27 PROCEDURE — 81001 URINALYSIS AUTO W/SCOPE: CPT | Performed by: EMERGENCY MEDICINE

## 2018-06-27 PROCEDURE — 99285 EMERGENCY DEPT VISIT HI MDM: CPT | Mod: 25

## 2018-06-27 PROCEDURE — 85027 COMPLETE CBC AUTOMATED: CPT | Performed by: EMERGENCY MEDICINE

## 2018-06-27 PROCEDURE — 93005 ELECTROCARDIOGRAM TRACING: CPT

## 2018-06-27 PROCEDURE — 96376 TX/PRO/DX INJ SAME DRUG ADON: CPT

## 2018-06-27 PROCEDURE — 96361 HYDRATE IV INFUSION ADD-ON: CPT

## 2018-06-27 RX ORDER — DIPHENHYDRAMINE HYDROCHLORIDE 50 MG/ML
25 INJECTION INTRAMUSCULAR; INTRAVENOUS ONCE
Status: COMPLETED | OUTPATIENT
Start: 2018-06-27 | End: 2018-06-27

## 2018-06-27 RX ORDER — ONDANSETRON 2 MG/ML
8 INJECTION INTRAMUSCULAR; INTRAVENOUS ONCE
Status: COMPLETED | OUTPATIENT
Start: 2018-06-27 | End: 2018-06-27

## 2018-06-27 RX ORDER — KETOROLAC TROMETHAMINE 15 MG/ML
15 INJECTION, SOLUTION INTRAMUSCULAR; INTRAVENOUS ONCE
Status: COMPLETED | OUTPATIENT
Start: 2018-06-27 | End: 2018-06-27

## 2018-06-27 RX ORDER — LORAZEPAM 2 MG/ML
1 INJECTION INTRAMUSCULAR ONCE
Status: COMPLETED | OUTPATIENT
Start: 2018-06-27 | End: 2018-06-27

## 2018-06-27 RX ORDER — PROMETHAZINE HYDROCHLORIDE 25 MG/ML
25 INJECTION, SOLUTION INTRAMUSCULAR; INTRAVENOUS ONCE
Status: DISCONTINUED | OUTPATIENT
Start: 2018-06-27 | End: 2018-06-27 | Stop reason: HOSPADM

## 2018-06-27 RX ORDER — METOCLOPRAMIDE HYDROCHLORIDE 5 MG/ML
10 INJECTION INTRAMUSCULAR; INTRAVENOUS ONCE
Status: DISCONTINUED | OUTPATIENT
Start: 2018-06-27 | End: 2018-06-27

## 2018-06-27 RX ORDER — DIPHENHYDRAMINE HYDROCHLORIDE 50 MG/ML
25 INJECTION INTRAMUSCULAR; INTRAVENOUS ONCE
Status: DISCONTINUED | OUTPATIENT
Start: 2018-06-27 | End: 2018-06-27

## 2018-06-27 RX ORDER — ONDANSETRON 4 MG/1
4 TABLET, ORALLY DISINTEGRATING ORAL EVERY 6 HOURS PRN
Qty: 15 TABLET | Refills: 0 | Status: SHIPPED | OUTPATIENT
Start: 2018-06-27 | End: 2018-06-30

## 2018-06-27 RX ORDER — HALOPERIDOL 5 MG/ML
5 INJECTION INTRAMUSCULAR ONCE
Status: COMPLETED | OUTPATIENT
Start: 2018-06-27 | End: 2018-06-27

## 2018-06-27 RX ADMIN — LORAZEPAM 1 MG: 2 INJECTION INTRAMUSCULAR; INTRAVENOUS at 19:02

## 2018-06-27 RX ADMIN — SODIUM CHLORIDE 1000 ML: 9 INJECTION, SOLUTION INTRAVENOUS at 17:29

## 2018-06-27 RX ADMIN — KETOROLAC TROMETHAMINE 15 MG: 15 INJECTION, SOLUTION INTRAMUSCULAR; INTRAVENOUS at 16:57

## 2018-06-27 RX ADMIN — SODIUM CHLORIDE 1000 ML: 9 INJECTION, SOLUTION INTRAVENOUS at 15:58

## 2018-06-27 RX ADMIN — DIPHENHYDRAMINE HYDROCHLORIDE 25 MG: 50 INJECTION, SOLUTION INTRAMUSCULAR; INTRAVENOUS at 18:10

## 2018-06-27 RX ADMIN — ONDANSETRON 8 MG: 2 INJECTION INTRAMUSCULAR; INTRAVENOUS at 16:58

## 2018-06-27 RX ADMIN — DIPHENHYDRAMINE HYDROCHLORIDE 25 MG: 50 INJECTION, SOLUTION INTRAMUSCULAR; INTRAVENOUS at 16:03

## 2018-06-27 RX ADMIN — HALOPERIDOL LACTATE 5 MG: 5 INJECTION, SOLUTION INTRAMUSCULAR at 17:28

## 2018-06-27 ASSESSMENT — ENCOUNTER SYMPTOMS
MYALGIAS: 1
DIFFICULTY URINATING: 0
VOMITING: 1
DIARRHEA: 0
NAUSEA: 1
HEADACHES: 1
BACK PAIN: 1
CHILLS: 1

## 2018-06-27 NOTE — TELEPHONE ENCOUNTER
Called patient to follow up on missed appointment today - a phone visit was not appropriate and KYRIE advises she go to Urgent Care today.     Patient is crying and states she cannot drive - is weak and dizzy. Her  is at work and she doesn't know anyone else who can drive her.     Patient has been puking projectile vomiting with hot sweats, can't get comfortable, chills, no actual temperature taken. Symptoms started 2 days ago, was in bed all day yesterday. At kid's baseball game the night before when it all started. Patient can't think of any foods that might have caused this. Since then can't keep any food down, has been sipping water, also not able to keep depression meds and anxiety meds down.     Advised cool cloth on back of neck, put feet up and sip on Gatorade which she has in the fridge.Could hear 10-year-old daughter in background crying as well. Patient  Stated her daughter doesn't like to see her upset. Advised her she needed to take some deep calming breaths and stay calm for her children. We did a couple of breathing exercises together.     Patient provided verbal consent to communicate with . Patient wanted me to call.     Called  and advised if they feel Jeannette is dehydrated, which is sounds like she is, they need to go to ER at [facility] for IV fluids since  cannot administer this. Also, to f/u with Alda once urgent needs are met. Markell stated understanding and is on his way.    Tiara GIRALDO, Triage RN

## 2018-06-27 NOTE — ED PROVIDER NOTES
"  History     Chief Complaint:  Nausea and vomiting     HPI   Jeannette Quesada is a 32 year old female, with history of fibromyalgia, who presents with nausea and vomiting. The patient was accompanied by her  and daughter. Her  reported she began having nausea about a week ago. The nausea then developed into vomiting, migraines, body aches, and mild diffuse lower back pain. The headache is mild and similar to prior episodes of headache. She denies any cj abdominal pain. She also notes she has been experiencing hot and cold flashes. She denies diarrhea, new rashes, or pain with urination. The patient had a cholesectomy and  section. She reports using Marijuana most days to control her pain and no alcohol use. She has had no recent travel outside of the [place]     Allergies:  No Known Drug Allergies    Medications:    Wellbutrin   Neurontin  Seroquel  Zoloft  Mitrex     Past Medical History:    Fibromyalgia   Depression  Migraine     Past Surgical History:     section  Cholecystectomy, Laparoscopic   Tubal Ligation    Family History:    The patient denies any relevant family medical history.    Social History:  The patient was accompanied to the ED by  and daughter   Smoking Status: Yes  Smokeless Tobacco: Yes  Alcohol Use: No  Marital Status:   [2]      Review of Systems   Constitutional: Positive for chills.   Gastrointestinal: Positive for nausea and vomiting. Negative for diarrhea.   Genitourinary: Negative for difficulty urinating.   Musculoskeletal: Positive for back pain and myalgias.   Skin: Negative for rash.   Neurological: Positive for headaches.   All other systems reviewed and are negative.    Physical Exam   Vitals:  Patient Vitals for the past 24 hrs:   BP Temp Temp src Pulse Resp SpO2 Height Weight   18 1413 111/83 97.2  F (36.2  C) Temporal 80 18 99 % 1.575 m (5' 2\") 65.8 kg (145 lb)         Physical Exam    Constitutional:  Pleasant,  " female lying in the left lateral decubitus position covering her eyes    HEENT:    Oropharynx is moist, without lesions or trismus.  Eyes:    Conjunctiva normal, PERRL  Neck:    Supple, no meningismus.     CV:     Regular rate and rhythm.      No murmurs, rubs or gallops.     No lower extremity edema.  PULM:    Clear to auscultation bilateral.       No respiratory distress.      Good air exchange.     No rales or wheezing.  ABD:    Soft, non-distended.       Minimal diffuse abdominal tenderness.     Bowel sounds normal.     No pulsatile masses.       No rebound, guarding or rigidity.     No CVA tenderness.      No hepatosplenomegaly.  MSK:     No gross deformity to all four extremities.   LYMPH:   No cervical lymphadenopathy.  NEURO:   Alert & O x 3.       CN II-XII intact, speech is clear with no aphasia.       Strength is 5/5 in all 4 extremities.  Sensation is intact.       Normal muscular tone, no tremor.  Skin:    Warm, dry and intact.    Psych:    Tearful    Emergency Department Course     ECG:  ECG taken at 1544, ECG read at 1547  Normal sinus rhythm   Nonspecific T wave abnormality   Prolonged QT  Abnormal ECG  Rate 72 bpm. CT interval 118. QRS duration 92. QT/QTc 432/473. P-R-T axes 43, 85, 29.      Laboratory:  Laboratory findings were communicated with the patient who voiced understanding of the findings.  CBC: AWNL (WBC 8.6, HGB 12.8, )  CMP: AWNL (Creatinine 0.61)  Lipase: 46  Magnesium: pending  HCG Qualitative pregnancy blood: negative      Interventions:    Medications   0.9% sodium chloride BOLUS (0 mLs Intravenous Stopped 6/27/18 1729)   haloperidol lactate (HALDOL) injection 5 mg (5 mg Intravenous Given 6/27/18 1728)   diphenhydrAMINE (BENADRYL) injection 25 mg (25 mg Intravenous Given 6/27/18 1603)   ketorolac (TORADOL) injection 15 mg (15 mg Intravenous Given 6/27/18 1657)   ondansetron (ZOFRAN) injection 8 mg (8 mg Intravenous Given 6/27/18 1658)   0.9% sodium chloride BOLUS (0 mLs  Intravenous Stopped 18)   diphenhydrAMINE (BENADRYL) injection 25 mg (25 mg Intravenous Given 18)   LORazepam (ATIVAN) injection 1 mg (1 mg Intravenous Given 18)         Emergency Department Course:  Nursing notes and vitals reviewed.  I performed an exam of the patient as documented above.     1700 Patient changed over to Dr. Guajardo    Impression & Plan      Medical Decision Makin-year-old female presented to the emerge from with primary complaints of nausea vomiting and concerns of dehydration.  On her abdominal examination she has a benign exam with no concerns of intra-abdominal catastrophe or obstruction at this time.  Basic laboratory studies thus far are reassuring.  She does have a history of migraine headache with mild headache symptoms today.  This likely represents migraine with significant nausea component versus viral illness such as gastritis but also suspicion for cannabis hyperemesis syndrome.  She does use marijuana daily to treat her fibromyalgia pain.  She was made aware of this possible entity.  Patient will be given additional antiemetics and IV fluids to evaluate for response.  I do not feel that advanced imaging is necessary.  Patient will be changed over to Dr. Guajardo and if symptoms improved is safe for discharge home.      Diagnosis:  (R11.2) Nausea and vomiting, intractability of vomiting not specified, unspecified vomiting type    (M79.7) Fibromyalgia    (F12.10) Cannabis abuse      Disposition:   Changed over to Dr. Guajardo      Discharge Medications:    Discharge Medication List as of 2018  8:44 PM      START taking these medications    Details   ondansetron (ZOFRAN ODT) 4 MG ODT tab Take 1 tablet (4 mg) by mouth every 6 hours as needed for nausea, Disp-15 tablet, R-0, Local Print             Scribe Disclosure:  Sandi WALTERS, am serving as a scribe at 3:26 PM on 2018 to document services personally performed by Jose Newsome MD,  based on my observations and the provider's statements to me.  [facility] EMERGENCY DEPARTMENT       Jose Newsome MD  06/28/18 0755

## 2018-06-27 NOTE — ED AVS SNAPSHOT
[facility] Emergency Department    [address]    [facility] [address]    Phone:  [phone]    Fax:  [phone]                                       Jeannette Quesada   MRN: [id number]    Department:  [facility] Emergency Department   Date of Visit:  6/27/2018           After Visit Summary Signature Page     I have received my discharge instructions, and my questions have been answered. I have discussed any challenges I see with this plan with the nurse or doctor.    ..........................................................................................................................................  Patient/Patient Representative Signature      ..........................................................................................................................................  Patient Representative Print Name and Relationship to Patient    ..................................................               ................................................  Date                                            Time    ..........................................................................................................................................  Reviewed by Signature/Title    ...................................................              ..............................................  Date                                                            Time

## 2018-06-27 NOTE — ED NOTES
"Pt. Is noted to be lying in dark room with blanket over head.  Pt is moaning.  Pt states that the nausea is better but is still there and \"horrible.\"  "

## 2018-06-27 NOTE — ED NOTES
"Pt noted to be screaming in room.  Pt is swinging arms, getting in and out of bed stating, \"I can't get comfortable.\"  She stated it is hot in the room.  This occurred right after receiving haldol. MD notified.    "

## 2018-06-27 NOTE — ED AVS SNAPSHOT
[facility] Emergency Department    [address] [place] [address]    Phone:  [phone]    Fax:  [phone]                                       Jeannette Quesada   MRN: [id number]    Department:  [facility] Emergency Department   Date of Visit:  6/27/2018           Patient Information     Date Of Birth          1985        Your diagnoses for this visit were:     Nausea and vomiting, intractability of vomiting not specified, unspecified vomiting type     Fibromyalgia     Cannabis abuse        You were seen by Jose Newsome MD and Walker Guajardo MD.      Follow-up Information     Follow up with Alda Mosquera Ra, TONA MITCHELL. Schedule an appointment as soon as possible for a visit in 3 days.    Specialty:  Family Practice    Contact information:    [address] [place] [id number] [phone]          Discharge Instructions       Please have a discussion with your primary care doctor regarding the cannabis use.  This may be contributing to your symptoms today specifically called cannabis hyperemesis syndrome in which people who use cannabis regularly can lead to intractable nausea and vomiting.    Discharge Instructions  Vomiting    You have been seen today for vomiting (throwing up). This is usually caused by a virus, but some bacteria, parasites, medicines or other medical conditions can cause similar symptoms. At this time your provider does not find that your vomiting is a sign of anything dangerous or life-threatening. However, sometimes the signs of serious illness do not show up right away. If you have new or worse symptoms, you may need to be seen again in the Emergency Department or by your primary provider. Remember that serious problems like appendicitis can start as vomiting.    Generally, every Emergency Department visit should have a follow-up clinic visit with either a primary or a specialty clinic/provider. Please  follow-up as instructed by your emergency provider today.    Return to the Emergency Department if:    You keep vomiting and you are not able to keep liquids down.     You feel you are getting dehydrated, such as being very thirsty, not urinating (peeing) at least every 8-12 hours, or feeling faint or lightheaded.     You develop a new fever, or your fever continues for more than 2 days.     You have abdominal (belly pain) that seems worse than cramps, is in one spot, or is getting worse over time. Appendicitis usually causes pain in the right lower abdomen (to the right and below your belly button) so watch for pain in this location.    You have blood in your vomit or stools.     You feel very weak.    You are not starting to improve within 24 hours of your visit here.     What can I do to help myself?    The most important thing to do is to drink clear liquids. If you have been vomiting a lot, it is best to have only small, frequent sips of liquids. Drinking too much at once may cause more vomiting. If you are vomiting often, you must replace minerals, sodium and potassium lost with your illness. Pedialyte  is the best available rehydration liquid but some find that it doesn t taste good so sports drinks are an alterative. You can also drink clear liquids such as water, weak tea, apple juice, and 7-Up . Avoid acid liquids (orange), caffeine (coffee) or alcohol. Do not drink milk until you no longer have diarrhea (loose stools).     After liquids are staying down, you may start eating mild foods. Soda crackers, toast, plain noodles, gelatin, applesauce and bananas are good first choices. Avoid foods that have acid, are spicy, fatty or have a lot of fiber (such as meats, coarse grains, vegetables). You may start eating these foods again in about 3 days when you are better.     Sometimes treatment includes prescription medicine to prevent nausea (sick to your stomach) and vomiting. If your provider prescribes these  for you, take them as directed.     Do not take ibuprofen, naproxen, or other nonsteroidal anti-inflammatory (NSAID) medicines without checking with your healthcare provider.     If you were given a prescription for medicine here today, be sure to read all of the information (including the package insert) that comes with your prescription.  This will include important information about the medicine, its side effects, and any warnings that you need to know about.  The pharmacist who fills the prescription can provide more information and answer questions you may have about the medicine.  If you have questions or concerns that the pharmacist cannot address, please call or return to the Emergency Department.     Remember that you can always come back to the Emergency Department if you are not able to see your regular provider in the amount of time listed above, if you get any new symptoms, or if there is anything that worries you.      24 Hour Appointment Hotline       To make an appointment at any [facility], call [phone] ([phone]). If you don't have a family doctor or clinic, we will help you find one. [facility] are conveniently located to serve the needs of you and your family.             Review of your medicines      START taking        Dose / Directions Last dose taken    ondansetron 4 MG ODT tab   Commonly known as:  ZOFRAN ODT   Dose:  4 mg   Quantity:  15 tablet        Take 1 tablet (4 mg) by mouth every 6 hours as needed for nausea   Refills:  0          Our records show that you are taking the medicines listed below. If these are incorrect, please call your family doctor or clinic.        Dose / Directions Last dose taken    buPROPion 300 MG 24 hr tablet   Commonly known as:  WELLBUTRIN XL   Dose:  300 mg   Quantity:  90 tablet        Take 1 tablet (300 mg) by mouth daily   Refills:  0        gabapentin 300 MG capsule   Commonly known as:  NEURONTIN   Quantity:  90 capsule         Take 1 tablet (300 mg) every night for 1-3 days, then 1 tablet twice daily for 1-3 days, then 1 tablet three times daily   Refills:  0        QUEtiapine 100 MG tablet   Commonly known as:  SEROquel   Quantity:  60 tablet        TAKE 2 TABLETS(200 MG) BY MOUTH AT BEDTIME   Refills:  0        sertraline 100 MG tablet   Commonly known as:  ZOLOFT   Quantity:  90 tablet        Take 1 tablet by mouth daily.   Refills:  1        * SUMAtriptan 100 MG tablet   Commonly known as:  IMITREX        TK 1 T PO PRN FOR MIGRAINE. MAY REPEAT AFTER 1-2 HOURS IF INEFFECTIVE. MAX 2 T PER 24 HOURS.   Refills:  0        * SUMAtriptan 100 MG tablet   Commonly known as:  IMITREX   Quantity:  9 tablet        TAKE 1 TABLET(100 MG) BY MOUTH AT ONSET OF HEADACHE FOR MIGRAINE. MAY REPEAT IN 2 HOURS. MAX 2 TABLETS/ 24 HOURS   Refills:  0        * Notice:  This list has 2 medication(s) that are the same as other medications prescribed for you. Read the directions carefully, and ask your doctor or other care provider to review them with you.            Prescriptions were sent or printed at these locations (1 Prescription)                   Other Prescriptions                Printed at Department/Unit printer (1 of 1)         ondansetron (ZOFRAN ODT) 4 MG ODT tab                Procedures and tests performed during your visit     CBC (platelets, no diff)    Comprehensive metabolic panel    EKG 12 lead    HCG QUALitative pregnancy (blood)    Lipase    Magnesium    Peripheral IV catheter    UA with Microscopic      Orders Needing Specimen Collection     None      Pending Results     Date and Time Order Name Status Description    6/27/2018 1535 EKG 12 lead Preliminary             Pending Culture Results     No orders found from 6/25/2018 to 6/28/2018.            Pending Results Instructions     If you had any lab results that were not finalized at the time of your Discharge, you can call the ED Lab Result RN at [phone]. You will be contacted by  this team for any positive Lab results or changes in treatment. The nurses are available 7 days a week from 10A to 6:30P.  You can leave a message 24 hours per day and they will return your call.        Test Results From Your Hospital Stay        6/27/2018  4:11 PM      Component Results     Component Value Ref Range & Units Status    WBC 8.6 4.0 - 11.0 10e9/L Final    RBC Count 4.75 3.8 - 5.2 10e12/L Final    Hemoglobin 12.8 11.7 - 15.7 g/dL Final    Hematocrit 40.1 35.0 - 47.0 % Final    MCV 84 78 - 100 fl Final    MCH 26.9 26.5 - 33.0 pg Final    MCHC 31.9 31.5 - 36.5 g/dL Final    RDW 12.7 10.0 - 15.0 % Final    Platelet Count 256 150 - 450 10e9/L Final         6/27/2018  4:29 PM      Component Results     Component Value Ref Range & Units Status    Sodium 135 133 - 144 mmol/L Final    Potassium 3.8 3.4 - 5.3 mmol/L Final    Chloride 103 94 - 109 mmol/L Final    Carbon Dioxide 20 20 - 32 mmol/L Final    Anion Gap 12 3 - 14 mmol/L Final    Glucose 83 70 - 99 mg/dL Final    Urea Nitrogen 15 7 - 30 mg/dL Final    Creatinine 0.61 0.52 - 1.04 mg/dL Final    GFR Estimate >90 >60 mL/min/1.7m2 Final    Non  GFR Calc    GFR Estimate If Black >90 >60 mL/min/1.7m2 Final    African American GFR Calc    Calcium 9.1 8.5 - 10.1 mg/dL Final    Bilirubin Total 1.0 0.2 - 1.3 mg/dL Final    Albumin 4.6 3.4 - 5.0 g/dL Final    Protein Total 8.2 6.8 - 8.8 g/dL Final    Alkaline Phosphatase 89 40 - 150 U/L Final    ALT 16 0 - 50 U/L Final    AST 13 0 - 45 U/L Final         6/27/2018  4:29 PM      Component Results     Component Value Ref Range & Units Status    Lipase 46 (L) 73 - 393 U/L Final         6/27/2018  4:29 PM      Component Results     Component Value Ref Range & Units Status    Magnesium 1.7 1.6 - 2.3 mg/dL Final         6/27/2018  5:23 PM      Component Results     Component Value Ref Range & Units Status    HCG Qualitative Serum Negative NEG^Negative Final    This test is for screening purposes.  Results  should be interpreted along with   the clinical picture.  Confirmation testing is available if warranted by   ordering IPK018, HCG Quantitative Pregnancy.           6/27/2018  4:49 PM      Component Results     Component Value Ref Range & Units Status    Color Urine Yellow  Final    Appearance Urine Cloudy  Final    Glucose Urine Negative NEG^Negative mg/dL Final    Bilirubin Urine Negative NEG^Negative Final    Ketones Urine 80 (A) NEG^Negative mg/dL Final    Specific Gravity Urine 1.027 1.003 - 1.035 Final    Blood Urine Negative NEG^Negative Final    pH Urine 5.0 5.0 - 7.0 pH Final    Protein Albumin Urine 100 (A) NEG^Negative mg/dL Final    Urobilinogen mg/dL 0.0 0.0 - 2.0 mg/dL Final    Nitrite Urine Negative NEG^Negative Final    Leukocyte Esterase Urine Trace (A) NEG^Negative Final    Source Midstream Urine  Final    WBC Urine 8 (H) 0 - 5 /HPF Final    RBC Urine 5 (H) 0 - 2 /HPF Final    Bacteria Urine Few (A) NEG^Negative /HPF Final    Squamous Epithelial /HPF Urine 12 (H) 0 - 1 /HPF Final    Mucous Urine Present (A) NEG^Negative /LPF Final    Hyaline Casts 2 0 - 2 /LPF Final                Clinical Quality Measure: Blood Pressure Screening     Your blood pressure was checked while you were in the emergency department today. The last reading we obtained was  BP: 111/83 . Please read the guidelines below about what these numbers mean and what you should do about them.  If your systolic blood pressure (the top number) is less than 120 and your diastolic blood pressure (the bottom number) is less than 80, then your blood pressure is normal. There is nothing more that you need to do about it.  If your systolic blood pressure (the top number) is 120-139 or your diastolic blood pressure (the bottom number) is 80-89, your blood pressure may be higher than it should be. You should have your blood pressure rechecked within a year by a primary care provider.  If your systolic blood pressure (the top number) is 140 or  "greater or your diastolic blood pressure (the bottom number) is 90 or greater, you may have high blood pressure. High blood pressure is treatable, but if left untreated over time it can put you at risk for heart attack, stroke, or kidney failure. You should have your blood pressure rechecked by a primary care provider within the next 4 weeks.  If your provider in the emergency department today gave you specific instructions to follow-up with your doctor or provider even sooner than that, you should follow that instruction and not wait for up to 4 weeks for your follow-up visit.        Thank you for choosing [facility]       Thank you for choosing [facility] for your care. Our goal is always to provide you with excellent care. Hearing back from our patients is one way we can continue to improve our services. Please take a few minutes to complete the written survey that you may receive in the mail after you visit with us. Thank you!        [facility] Information     [employer] lets you send messages to your doctor, view your test results, renew your prescriptions, schedule appointments and more. To sign up, go to [email or web address] . Click on \"Log in\" on the left side of the screen, which will take you to the Welcome page. Then click on \"Sign up Now\" on the right side of the page.     You will be asked to enter the access code listed below, as well as some personal information. Please follow the directions to create your username and password.     Your access code is: [email or web address]  Expires: 2018  2:22 PM     Your access code will  in 90 days. If you need help or a new code, please call your [facility] clinic or [phone].        Care EveryWhere ID     This is your Care EveryWhere ID. This could be used by other organizations to access your [facility] medical records  [id number]        Equal Access to Services     PEPPER VASQUEZ AH: erin Johnson qaybta kaalmada adeegyada, waxay " alon thomas ah. So [facility] [phone].    ATENCIÓN: Si habla español, tiene a modi disposición servicios gratuitos de asistencia lingüística. Llame al [phone].    We comply with applicable federal civil rights laws and [place] laws. We do not discriminate on the basis of race, color, national origin, age, disability, sex, sexual orientation, or gender identity.            After Visit Summary       This is your record. Keep this with you and show to your community pharmacist(s) and doctor(s) at your next visit.

## 2018-06-27 NOTE — DISCHARGE INSTRUCTIONS
Please have a discussion with your primary care doctor regarding the cannabis use.  This may be contributing to your symptoms today specifically called cannabis hyperemesis syndrome in which people who use cannabis regularly can lead to intractable nausea and vomiting.    Discharge Instructions  Vomiting    You have been seen today for vomiting (throwing up). This is usually caused by a virus, but some bacteria, parasites, medicines or other medical conditions can cause similar symptoms. At this time your provider does not find that your vomiting is a sign of anything dangerous or life-threatening. However, sometimes the signs of serious illness do not show up right away. If you have new or worse symptoms, you may need to be seen again in the Emergency Department or by your primary provider. Remember that serious problems like appendicitis can start as vomiting.    Generally, every Emergency Department visit should have a follow-up clinic visit with either a primary or a specialty clinic/provider. Please follow-up as instructed by your emergency provider today.    Return to the Emergency Department if:    You keep vomiting and you are not able to keep liquids down.     You feel you are getting dehydrated, such as being very thirsty, not urinating (peeing) at least every 8-12 hours, or feeling faint or lightheaded.     You develop a new fever, or your fever continues for more than 2 days.     You have abdominal (belly pain) that seems worse than cramps, is in one spot, or is getting worse over time. Appendicitis usually causes pain in the right lower abdomen (to the right and below your belly button) so watch for pain in this location.    You have blood in your vomit or stools.     You feel very weak.    You are not starting to improve within 24 hours of your visit here.     What can I do to help myself?    The most important thing to do is to drink clear liquids. If you have been vomiting a lot, it is best to have  only small, frequent sips of liquids. Drinking too much at once may cause more vomiting. If you are vomiting often, you must replace minerals, sodium and potassium lost with your illness. Pedialyte  is the best available rehydration liquid but some find that it doesn t taste good so sports drinks are an alterative. You can also drink clear liquids such as water, weak tea, apple juice, and 7-Up . Avoid acid liquids (orange), caffeine (coffee) or alcohol. Do not drink milk until you no longer have diarrhea (loose stools).     After liquids are staying down, you may start eating mild foods. Soda crackers, toast, plain noodles, gelatin, applesauce and bananas are good first choices. Avoid foods that have acid, are spicy, fatty or have a lot of fiber (such as meats, coarse grains, vegetables). You may start eating these foods again in about 3 days when you are better.     Sometimes treatment includes prescription medicine to prevent nausea (sick to your stomach) and vomiting. If your provider prescribes these for you, take them as directed.     Do not take ibuprofen, naproxen, or other nonsteroidal anti-inflammatory (NSAID) medicines without checking with your healthcare provider.     If you were given a prescription for medicine here today, be sure to read all of the information (including the package insert) that comes with your prescription.  This will include important information about the medicine, its side effects, and any warnings that you need to know about.  The pharmacist who fills the prescription can provide more information and answer questions you may have about the medicine.  If you have questions or concerns that the pharmacist cannot address, please call or return to the Emergency Department.     Remember that you can always come back to the Emergency Department if you are not able to see your regular provider in the amount of time listed above, if you get any new symptoms, or if there is anything that  worries you.

## 2018-06-27 NOTE — ED NOTES
Pt states that she does not have nausea at this time. Pt is no longer anxious, jittery, moving in bed. Pt resting with lights off. Provided ice pack. Lights turned down, side rails up. Pt denies needs at this time.

## 2018-06-27 NOTE — ED TRIAGE NOTES
"Pt reports 2 days of \"projectile vomiting\", hx of migraines, headache started today. Pt c/o hot/cold flashes, sensitivity to light and sound and general body pain. Pt has a hx of fibromyalgia, has not been able to keep her normal medications down.   "

## 2018-06-28 LAB — INTERPRETATION ECG - MUSE: NORMAL

## 2018-07-03 DIAGNOSIS — F33.1 MODERATE EPISODE OF RECURRENT MAJOR DEPRESSIVE DISORDER (H): ICD-10-CM

## 2018-07-04 NOTE — TELEPHONE ENCOUNTER
"Requested Prescriptions   Pending Prescriptions Disp Refills     QUEtiapine (SEROQUEL) 100 MG tablet [Pharmacy Med Name: QUETIAPINE 100MG TABLETS]    Last Written Prescription Date:  6/5/2018  Last Fill Quantity: 60,  # refills: 0   Last office visit: 6/1/2018 with prescribing provider:  Alda Mosquera Ra     Future Office Visit:     60 tablet 0     Sig: TAKE 2 TABLETS BY MOUTH AT BEDTIME    Antipsychotic Medications Failed    7/3/2018  3:44 PM       Failed - Lipid panel on file within the past 12 months    No lab results found.           Passed - Blood pressure under 140/90 in past 12 months    BP Readings from Last 3 Encounters:   06/27/18 109/60   06/01/18 104/70   04/26/18 100/66                Passed - Patient is 12 years of age or older       Passed - CBC on file in past 12 months    Recent Labs   Lab Test  06/27/18   1543   WBC  8.6   RBC  4.75   HGB  12.8   HCT  40.1   PLT  256       For [facility] ONLY: [id number] = WBC, [id number] = RBC         Passed - Heart Rate on file within past 12 months    Pulse Readings from Last 3 Encounters:   06/27/18 80   06/01/18 104   04/26/18 100              Passed - A1c or Glucose on file in past 12 months    Recent Labs   Lab Test  06/27/18   1543   GLC  83       Please review patients last 3 weights. If a weight gain of >10 lbs exists, you may refill the prescription once after instructing the patient to schedule an appointment within the next 30 days.    Wt Readings from Last 3 Encounters:   06/27/18 145 lb (65.8 kg)   06/01/18 150 lb 4.8 oz (68.2 kg)   04/26/18 155 lb 6.4 oz (70.5 kg)            Passed - Patient is not pregnant       Passed - No positve pregnancy test on file in past 12 months       Passed - Recent (6 mo) or future (30 days) visit within the authorizing provider's specialty    Patient had office visit in the last 6 months or has a visit in the next 30 days with authorizing provider or within the authorizing provider's specialty.  See \"Patient Info\" tab in " "inbasket, or \"Choose Columns\" in Meds & Orders section of the refill encounter.              "

## 2018-07-06 RX ORDER — QUETIAPINE FUMARATE 100 MG/1
TABLET, FILM COATED ORAL
Qty: 60 TABLET | Refills: 0 | Status: SHIPPED | OUTPATIENT
Start: 2018-07-06 | End: 2018-07-29

## 2018-07-06 NOTE — TELEPHONE ENCOUNTER
Routing refill request to provider for review/approval because:  Labs not current:  Lipids  Jono Barrera RN, BSN

## 2018-07-11 ENCOUNTER — TRANSFERRED RECORDS (OUTPATIENT)
Dept: HEALTH INFORMATION MANAGEMENT | Facility: CLINIC | Age: 33
End: 2018-07-11

## 2018-07-15 DIAGNOSIS — F33.1 MODERATE EPISODE OF RECURRENT MAJOR DEPRESSIVE DISORDER (H): ICD-10-CM

## 2018-07-16 NOTE — TELEPHONE ENCOUNTER
"Requested Prescriptions   Pending Prescriptions Disp Refills     buPROPion (WELLBUTRIN XL) 300 MG 24 hr tablet [Pharmacy Med Name: BUPROPION XL 300MG TABLETS]  Last Written Prescription Date:  06/01/2018  Last Fill Quantity: 90 tablet,  # refills: 0   Last office visit: 6/1/2018 with prescribing provider:  Alda Mosquera Ra, APRN CNP   Future Office Visit:     90 tablet 0     Sig: TAKE 1 TABLET BY MOUTH EVERY DAY    SSRIs Protocol Failed    7/15/2018  3:18 AM       Failed - PHQ-9 score less than 5 in past 6 months    Please review last PHQ-9 score.   PHQ-9 SCORE 10/12/2017 1/10/2018 2/16/2018   Total Score 6 10 24     JOEY-7 SCORE 10/12/2017 1/10/2018 2/16/2018   Total Score 5 5 21              Passed - Medication is Bupropion    If the medication is Bupropion (Wellbutrin), and the patient is taking for smoking cessation; OK to refill.         Passed - Patient is age 18 or older       Passed - No active pregnancy on record       Passed - No positive pregnancy test in last 12 months       Passed - Recent (6 mo) or future (30 days) visit within the authorizing provider's specialty    Patient had office visit in the last 6 months or has a visit in the next 30 days with authorizing provider or within the authorizing provider's specialty.  See \"Patient Info\" tab in inbasket, or \"Choose Columns\" in Meds & Orders section of the refill encounter.              "

## 2018-07-18 RX ORDER — BUPROPION HYDROCHLORIDE 300 MG/1
TABLET ORAL
Refills: 0 | OUTPATIENT
Start: 2018-07-18

## 2018-07-18 NOTE — TELEPHONE ENCOUNTER
Called and left a message to call us back to see if she is out of meds - should have some until around 9/18, due for updated phq9 in August 2018

## 2018-07-29 DIAGNOSIS — F33.1 MODERATE EPISODE OF RECURRENT MAJOR DEPRESSIVE DISORDER (H): ICD-10-CM

## 2018-07-29 NOTE — LETTER
August 9, 2018      Jeannette Quesada  [address] [address]        Dear Ms. Jeannette Quesada,    We are contacting you to schedule an appointment that has been requested by your doctor. We are unable to provide further refills of your medication until this visit is complete. Please call us at [phone] to schedule.      Sincerely,     Your [facility] Team

## 2018-07-30 NOTE — TELEPHONE ENCOUNTER
"Requested Prescriptions   Pending Prescriptions Disp Refills     QUEtiapine (SEROQUEL) 100 MG tablet [Pharmacy Med Name: QUETIAPINE 100MG TABLETS]  Last Written Prescription Date:  7/6/18  Last Fill Quantity: 60,  # refills: 0   Last office visit: 6/1/2018 with prescribing provider:  Alda Mosquera Ra, APRN CNP   Future Office Visit:     60 tablet 0     Sig: TAKE 2 TABLETS BY MOUTH AT BEDTIME    Antipsychotic Medications Failed    7/29/2018 12:17 PM       Failed - Lipid panel on file within the past 12 months    No lab results found.           Passed - Blood pressure under 140/90 in past 12 months    BP Readings from Last 3 Encounters:   06/27/18 109/60   06/01/18 104/70   04/26/18 100/66                Passed - Patient is 12 years of age or older       Passed - CBC on file in past 12 months    Recent Labs   Lab Test  06/27/18   1543   WBC  8.6   RBC  4.75   HGB  12.8   HCT  40.1   PLT  256       For [facility] ONLY: [id number] = WBC, [id number] = RBC         Passed - Heart Rate on file within past 12 months    Pulse Readings from Last 3 Encounters:   06/27/18 80   06/01/18 104   04/26/18 100              Passed - A1c or Glucose on file in past 12 months    Recent Labs   Lab Test  06/27/18   1543   GLC  83       Please review patients last 3 weights. If a weight gain of >10 lbs exists, you may refill the prescription once after instructing the patient to schedule an appointment within the next 30 days.    Wt Readings from Last 3 Encounters:   06/27/18 145 lb (65.8 kg)   06/01/18 150 lb 4.8 oz (68.2 kg)   04/26/18 155 lb 6.4 oz (70.5 kg)            Passed - Patient is not pregnant       Passed - No positve pregnancy test on file in past 12 months       Passed - Recent (6 mo) or future (30 days) visit within the authorizing provider's specialty    Patient had office visit in the last 6 months or has a visit in the next 30 days with authorizing provider or within the authorizing provider's specialty.  See \"Patient Info\" tab in " "inbasket, or \"Choose Columns\" in Meds & Orders section of the refill encounter.              "

## 2018-08-01 NOTE — TELEPHONE ENCOUNTER
Routing refill request to provider for review/approval because:  Labs not current:  Due for lipids, order already cristin-d up     Will forward to the station, please call the pt and have her schedule fasting labs.  Thanks!

## 2018-08-02 RX ORDER — QUETIAPINE FUMARATE 100 MG/1
TABLET, FILM COATED ORAL
Qty: 60 TABLET | Refills: 0 | Status: SHIPPED | OUTPATIENT
Start: 2018-08-02 | End: 2018-08-24

## 2018-08-17 ENCOUNTER — TELEPHONE (OUTPATIENT)
Dept: FAMILY MEDICINE | Facility: CLINIC | Age: 33
End: 2018-08-17

## 2018-08-17 ENCOUNTER — OFFICE VISIT (OUTPATIENT)
Dept: PEDIATRICS | Facility: CLINIC | Age: 33
End: 2018-08-17
Payer: COMMERCIAL

## 2018-08-17 VITALS
TEMPERATURE: 98.6 F | OXYGEN SATURATION: 98 % | DIASTOLIC BLOOD PRESSURE: 62 MMHG | SYSTOLIC BLOOD PRESSURE: 100 MMHG | BODY MASS INDEX: 26.34 KG/M2 | HEART RATE: 78 BPM | WEIGHT: 144 LBS

## 2018-08-17 DIAGNOSIS — R10.84 ABDOMINAL PAIN, GENERALIZED: Primary | ICD-10-CM

## 2018-08-17 DIAGNOSIS — N30.00 ACUTE CYSTITIS WITHOUT HEMATURIA: ICD-10-CM

## 2018-08-17 LAB
ALBUMIN UR-MCNC: NEGATIVE MG/DL
APPEARANCE UR: ABNORMAL
BACTERIA #/AREA URNS HPF: ABNORMAL /HPF
BILIRUB UR QL STRIP: NEGATIVE
COLOR UR AUTO: YELLOW
GLUCOSE UR STRIP-MCNC: NEGATIVE MG/DL
HGB UR QL STRIP: ABNORMAL
KETONES UR STRIP-MCNC: NEGATIVE MG/DL
LEUKOCYTE ESTERASE UR QL STRIP: ABNORMAL
NITRATE UR QL: POSITIVE
NON-SQ EPI CELLS #/AREA URNS LPF: ABNORMAL /LPF
PH UR STRIP: 5.5 PH (ref 5–7)
RBC #/AREA URNS AUTO: ABNORMAL /HPF
SOURCE: ABNORMAL
SP GR UR STRIP: >1.03 (ref 1–1.03)
UROBILINOGEN UR STRIP-ACNC: 0.2 EU/DL (ref 0.2–1)
WBC #/AREA URNS AUTO: ABNORMAL /HPF

## 2018-08-17 PROCEDURE — 87186 SC STD MICRODIL/AGAR DIL: CPT | Performed by: PEDIATRICS

## 2018-08-17 PROCEDURE — 99213 OFFICE O/P EST LOW 20 MIN: CPT | Performed by: PEDIATRICS

## 2018-08-17 PROCEDURE — 87086 URINE CULTURE/COLONY COUNT: CPT | Performed by: PEDIATRICS

## 2018-08-17 PROCEDURE — 87088 URINE BACTERIA CULTURE: CPT | Performed by: PEDIATRICS

## 2018-08-17 PROCEDURE — 81001 URINALYSIS AUTO W/SCOPE: CPT | Performed by: PEDIATRICS

## 2018-08-17 RX ORDER — PREGABALIN 150 MG/1
CAPSULE ORAL
Refills: 0 | COMMUNITY
Start: 2018-08-03 | End: 2018-09-19

## 2018-08-17 RX ORDER — NITROFURANTOIN 25; 75 MG/1; MG/1
100 CAPSULE ORAL 2 TIMES DAILY
Qty: 14 CAPSULE | Refills: 0 | Status: SHIPPED | OUTPATIENT
Start: 2018-08-17 | End: 2018-09-19

## 2018-08-17 NOTE — TELEPHONE ENCOUNTER
"Patient calling with severe abdominal pain which started this morning upon waking. Hurts from side to side across the lower abdomen. Pain is constant, rating at a 10. Last BM Wednesday after, smaller than normal. Usual consistency.    Usually gets period first week of every month. Has not gotten it yet, but she has had tubal ligation. Has not done home pregnancy test. Has read about tampons not being removed and is concerned about that. Also notes \"my urine stinks\" - has noticed this for the past month. No other UTI symptoms. No fever noted. No blood noted in stool.    This morning felt nauseated but did not vomit. No eating habits changed. No blood noted in stool or urine.     Advised to be seen tonight in in UC. Checked for any cancellations in RM and EA today, Lorna Vizcaino had an opening at 3:00. Patient preferred to be seen in clinic.   Next 5 appointments (look out 90 days)     Aug 17, 2018  3:00 PM CDT   SHORT with Lorna Vizcaino MD   [facility]    [address]  [address]  [place] [address] [phone]                Tiara GIRALDO, Triage RN      "

## 2018-08-17 NOTE — PROGRESS NOTES
SUBJECTIVE:   Jeannette Quesada is a 32 year old female who presents to clinic today for the following health issues:      Abdominal Pain      Duration: 1 day    Description (location/character/radiation): lower abdomen       Associated flank pain: None    Intensity:  severe    Accompanying signs and symptoms:        Fever/Chills: no        Gas/Bloating: YES       Nausea/vomitting: no        Diarrhea: no        Dysuria or Hematuria: no     History (previous similar pain/trauma/previous testing):     Precipitating or alleviating factors:       Pain worse with eating/BM/urination: no       Pain relieved by BM: no     Therapies tried and outcome: None    LMP:  07-      Problem list and histories reviewed & adjusted, as indicated.  Additional history: as documented        Reviewed and updated as needed this visit by clinical staff       Reviewed and updated as needed this visit by Provider         ROS:  Constitutional, HEENT, cardiovascular, pulmonary, gi and gu systems are negative, except as otherwise noted.    OBJECTIVE:     /62 (BP Location: Right arm, Cuff Size: Adult Regular)  Pulse 78  Temp 98.6  F (37  C)  Wt 144 lb (65.3 kg)  LMP 07/06/2018  SpO2 98%  BMI 26.34 kg/m2  Body mass index is 26.34 kg/(m^2).  GENERAL APPEARANCE: uncomfortable  ABDOMEN: +suprapubid tenderness  BACK: +bl CVA tenderness    Diagnostic Test Results:  none     ASSESSMENT/PLAN:       1. Abdominal pain, generalized  - UA reflex to Microscopic and Culture  - Urine Microscopic  - Urine Culture Aerobic Bacterial    2. Acute cystitis without hematuria  If not improving in 48 hours or worsening symptoms over w/e needs to be reevaluated.  Afebrile at this time. Period a few days late - advised to check home pregnancy test despite tubal.  Likely late due to illness.  - nitroFURantoin, macrocrystal-monohydrate, (MACROBID) 100 MG capsule; Take 1 capsule (100 mg) by mouth 2 times daily  Dispense: 14 capsule; Refill:  "0      Patient Instructions      Start antibiotics tonight.  If not starting to improve by Sunday/Monday need to be seen again. Complete 7 days of antibiotics.    * BLADDER INFECTION,Female (Adult)    A bladder infection (\"cystitis\" or \"UTI\") usually causes a constant urge to urinate and a burning when passing urine. Urine may be cloudy, smelly or dark. There may be pain in the lower abdomen. A bladder infection occurs when bacteria from the vaginal area enter the bladder opening (urethra). This can occur from sexual intercourse, wearing tight clothing, dehydration and other factors.  HOME CARE:  1. Drink lots of fluids (at least 6-8 glasses a day, unless you must restrict fluids for other medical reasons). This will force the medicine into your urinary system and flush the bacteria out of your body. Cranberry juice has been shown to help clear out the bacteria.  2. Avoid sexual intercourse until your symptoms are gone.  3. A bladder infection is treated with antibiotics. You may also be given Pyridium (generic = phenazopyridine) to reduce the burning sensation. This medicine will cause your urine to become a bright orange color. The orange urine may stain clothing. You may wear a pad or panty-liner to protect clothing.  PREVENTING FUTURE INFECTIONS:  1. Always wipe from front to back after a bowel movement.  2. Keep the genital area clean and dry.  3. Drink plenty of fluids each day to avoid dehydration.  4. Urinate right after intercourse to flush out the bladder.  5. Wear cotton underwear and cotton-lined panty hose; avoid tight-fitting pants.  6. If you are on birth control pills and are having frequent bladder infections, discuss with your doctor.  FOLLOW UP: Return to this facility or see your doctor if ALL symptoms are not gone after three days of treatment.  GET PROMPT MEDICAL ATTENTION if any of the following occur:    Fever over 101 F (38.3 C)    No improvement by the third day of treatment    Increasing " back or abdominal pain    Repeated vomiting; unable to keep medicine down    Weakness, dizziness or fainting    Vaginal discharge    Pain, redness or swelling in the labia (outer vaginal area)    6332-6108 [employer]. [address] [place] [address], [place] [address]. All rights reserved. This information is not intended as a substitute for professional medical care. Always follow your healthcare professional's instructions.  This information has been modified by your health care provider with permission from the publisher.        Lorna Vizcaino MD  [facility]

## 2018-08-17 NOTE — PATIENT INSTRUCTIONS
"   Start antibiotics tonight.  If not starting to improve by Sunday/Monday need to be seen again. Complete 7 days of antibiotics.    * BLADDER INFECTION,Female (Adult)    A bladder infection (\"cystitis\" or \"UTI\") usually causes a constant urge to urinate and a burning when passing urine. Urine may be cloudy, smelly or dark. There may be pain in the lower abdomen. A bladder infection occurs when bacteria from the vaginal area enter the bladder opening (urethra). This can occur from sexual intercourse, wearing tight clothing, dehydration and other factors.  HOME CARE:  1. Drink lots of fluids (at least 6-8 glasses a day, unless you must restrict fluids for other medical reasons). This will force the medicine into your urinary system and flush the bacteria out of your body. Cranberry juice has been shown to help clear out the bacteria.  2. Avoid sexual intercourse until your symptoms are gone.  3. A bladder infection is treated with antibiotics. You may also be given Pyridium (generic = phenazopyridine) to reduce the burning sensation. This medicine will cause your urine to become a bright orange color. The orange urine may stain clothing. You may wear a pad or panty-liner to protect clothing.  PREVENTING FUTURE INFECTIONS:  1. Always wipe from front to back after a bowel movement.  2. Keep the genital area clean and dry.  3. Drink plenty of fluids each day to avoid dehydration.  4. Urinate right after intercourse to flush out the bladder.  5. Wear cotton underwear and cotton-lined panty hose; avoid tight-fitting pants.  6. If you are on birth control pills and are having frequent bladder infections, discuss with your doctor.  FOLLOW UP: Return to this facility or see your doctor if ALL symptoms are not gone after three days of treatment.  GET PROMPT MEDICAL ATTENTION if any of the following occur:    Fever over 101 F (38.3 C)    No improvement by the third day of treatment    Increasing back or abdominal " pain    Repeated vomiting; unable to keep medicine down    Weakness, dizziness or fainting    Vaginal discharge    Pain, redness or swelling in the labia (outer vaginal area)    7430-9708 [employer]. [address] [place] [address], [place] [address]. All rights reserved. This information is not intended as a substitute for professional medical care. Always follow your healthcare professional's instructions.  This information has been modified by your health care provider with permission from the publisher.

## 2018-08-17 NOTE — MR AVS SNAPSHOT
"              After Visit Summary   8/17/2018    Jeannette Quesada    MRN: [id number]           Patient Information     Date Of Birth          1985        Visit Information        Provider Department      8/17/2018 3:00 PM Lorna Vizcaino MD [facility]        Today's Diagnoses     Abdominal pain, generalized    -  1    Acute cystitis without hematuria          Care Instructions       Start antibiotics tonight.  If not starting to improve by Sunday/Monday need to be seen again. Complete 7 days of antibiotics.    * BLADDER INFECTION,Female (Adult)    A bladder infection (\"cystitis\" or \"UTI\") usually causes a constant urge to urinate and a burning when passing urine. Urine may be cloudy, smelly or dark. There may be pain in the lower abdomen. A bladder infection occurs when bacteria from the vaginal area enter the bladder opening (urethra). This can occur from sexual intercourse, wearing tight clothing, dehydration and other factors.  HOME CARE:  1. Drink lots of fluids (at least 6-8 glasses a day, unless you must restrict fluids for other medical reasons). This will force the medicine into your urinary system and flush the bacteria out of your body. Cranberry juice has been shown to help clear out the bacteria.  2. Avoid sexual intercourse until your symptoms are gone.  3. A bladder infection is treated with antibiotics. You may also be given Pyridium (generic = phenazopyridine) to reduce the burning sensation. This medicine will cause your urine to become a bright orange color. The orange urine may stain clothing. You may wear a pad or panty-liner to protect clothing.  PREVENTING FUTURE INFECTIONS:  1. Always wipe from front to back after a bowel movement.  2. Keep the genital area clean and dry.  3. Drink plenty of fluids each day to avoid dehydration.  4. Urinate right after intercourse to flush out the bladder.  5. Wear cotton underwear and cotton-lined panty hose; avoid tight-fitting " pants.  6. If you are on birth control pills and are having frequent bladder infections, discuss with your doctor.  FOLLOW UP: Return to this facility or see your doctor if ALL symptoms are not gone after three days of treatment.  GET PROMPT MEDICAL ATTENTION if any of the following occur:    Fever over 101 F (38.3 C)    No improvement by the third day of treatment    Increasing back or abdominal pain    Repeated vomiting; unable to keep medicine down    Weakness, dizziness or fainting    Vaginal discharge    Pain, redness or swelling in the labia (outer vaginal area)    9778-3347 [employer]. [address] [place] [address]. All rights reserved. This information is not intended as a substitute for professional medical care. Always follow your healthcare professional's instructions.  This information has been modified by your health care provider with permission from the publisher.            Follow-ups after your visit        Who to contact     If you have questions or need follow up information about today's clinic visit or your schedule please contact [facility] directly at [phone].  Normal or non-critical lab and imaging results will be communicated to you by [employer], letter or phone within 4 business days after the clinic has received the results. If you do not hear from us within 7 days, please contact the clinic through [employer] or phone. If you have a critical or abnormal lab result, we will notify you by phone as soon as possible.  Submit refill requests through [employer] or call your pharmacy and they will forward the refill request to us. Please allow 3 business days for your refill to be completed.          Additional Information About Your Visit        [employer] Information     [employer] gives you secure access to your electronic health record. If you see a primary care provider, you can also send messages to your care team and make appointments. If you have questions,  please call your primary care clinic.  If you do not have a primary care provider, please call [phone] and they will assist you.        Care EveryWhere ID     This is your Care EveryWhere ID. This could be used by other organizations to access your [facility] medical records  [id number]        Your Vitals Were     Pulse Temperature Last Period Pulse Oximetry BMI (Body Mass Index)       78 98.6  F (37  C) 07/06/2018 98% 26.34 kg/m2        Blood Pressure from Last 3 Encounters:   08/17/18 100/62   06/27/18 109/60   06/01/18 104/70    Weight from Last 3 Encounters:   08/17/18 144 lb (65.3 kg)   06/27/18 145 lb (65.8 kg)   06/01/18 150 lb 4.8 oz (68.2 kg)              We Performed the Following     UA reflex to Microscopic and Culture     Urine Culture Aerobic Bacterial     Urine Microscopic          Today's Medication Changes          These changes are accurate as of 8/17/18  4:01 PM.  If you have any questions, ask your nurse or doctor.               Start taking these medicines.        Dose/Directions    nitroFURantoin (macrocrystal-monohydrate) 100 MG capsule   Commonly known as:  MACROBID   Used for:  Acute cystitis without hematuria   Started by:  Lorna Vizcaino MD        Dose:  100 mg   Take 1 capsule (100 mg) by mouth 2 times daily   Quantity:  14 capsule   Refills:  0            Where to get your medicines      These medications were sent to [facility] [address] [facility] [address] [facility] AT [facility] [address] [facility]  [address] [facility] [address]     Phone:  [phone]     nitroFURantoin (macrocrystal-monohydrate) 100 MG capsule                Primary Care Provider Office Phone # Fax #    TONA Ford Ra, [phone] [address]  [facility] [address]        Equal Access to Services     PEPPER VASQUEZ AH: Hadii desmond lazo hadasho Soomaali, waaxda luqadaha, qaybta kaalmada adeegyada, waxay alon hayyari smith. So [facility]  [phone].    ATENCIÓN: Si julian marks, tiene a modi disposición servicios gratuitos de asistencia lingüística. Hunter wolff [phone].    We comply with applicable federal civil rights laws and [place] laws. We do not discriminate on the basis of race, color, national origin, age, disability, sex, sexual orientation, or gender identity.            Thank you!     Thank you for choosing [facility]  for your care. Our goal is always to provide you with excellent care. Hearing back from our patients is one way we can continue to improve our services. Please take a few minutes to complete the written survey that you may receive in the mail after your visit with us. Thank you!             Your Updated Medication List - Protect others around you: Learn how to safely use, store and throw away your medicines at www.disposemymeds.org.          This list is accurate as of 8/17/18  4:01 PM.  Always use your most recent med list.                   Brand Name Dispense Instructions for use Diagnosis    buPROPion 300 MG 24 hr tablet    WELLBUTRIN XL    90 tablet    Take 1 tablet (300 mg) by mouth daily    Moderate episode of recurrent major depressive disorder (H)       gabapentin 300 MG capsule    NEURONTIN    90 capsule    Take 1 tablet (300 mg) every night for 1-3 days, then 1 tablet twice daily for 1-3 days, then 1 tablet three times daily    Myalgia       LYRICA 150 MG capsule   Generic drug:  pregabalin      TK 1 C PO BID        nitroFURantoin (macrocrystal-monohydrate) 100 MG capsule    MACROBID    14 capsule    Take 1 capsule (100 mg) by mouth 2 times daily    Acute cystitis without hematuria       QUEtiapine 100 MG tablet    SEROquel    60 tablet    TAKE 2 TABLETS BY MOUTH AT BEDTIME    Moderate episode of recurrent major depressive disorder (H)       sertraline 100 MG tablet    ZOLOFT    90 tablet    Take 1 tablet by mouth daily.    Moderate episode of recurrent major depressive disorder (H)       *  SUMAtriptan 100 MG tablet    IMITREX     TK 1 T PO PRN FOR MIGRAINE. MAY REPEAT AFTER 1-2 HOURS IF INEFFECTIVE. MAX 2 T PER 24 HOURS.        * SUMAtriptan 100 MG tablet    IMITREX    9 tablet    TAKE 1 TABLET(100 MG) BY MOUTH AT ONSET OF HEADACHE FOR MIGRAINE. MAY REPEAT IN 2 HOURS. MAX 2 TABLETS/ 24 HOURS    Migraine without aura and without status migrainosus, not intractable       * Notice:  This list has 2 medication(s) that are the same as other medications prescribed for you. Read the directions carefully, and ask your doctor or other care provider to review them with you.

## 2018-08-19 LAB
BACTERIA SPEC CULT: ABNORMAL
SPECIMEN SOURCE: ABNORMAL

## 2018-08-20 DIAGNOSIS — G43.009 MIGRAINE WITHOUT AURA AND WITHOUT STATUS MIGRAINOSUS, NOT INTRACTABLE: ICD-10-CM

## 2018-08-20 NOTE — TELEPHONE ENCOUNTER
"Requested Prescriptions   Pending Prescriptions Disp Refills     SUMAtriptan (IMITREX) 100 MG tablet [Pharmacy Med Name: SUMATRIPTAN 100MG TABLETS]  Last Written Prescription Date:  6/11/18  Last Fill Quantity: 9,  # refills: 0   Last office visit: 8/17/2018 with prescribing provider:  Lorna Vizcaino MD   Future Office Visit:     9 tablet 0     Sig: TAKE 1 TABLET(100 MG) BY MOUTH AT ONSET OF HEADACHE FOR MIGRAINE. MAY REPEAT IN 2 HOURS. MAX 2 TABLETS/ 24 HOURS    Serotonin Agonists Failed    8/20/2018  3:18 AM       Failed - Serotonin Agonist request needs review.    Please review patient's record. If patient has had 8 or more treatments in the past month, please forward to provider.         Passed - Blood pressure under 140/90 in past 12 months    BP Readings from Last 3 Encounters:   08/17/18 100/62   06/27/18 109/60   06/01/18 104/70                Passed - Recent (12 mo) or future (30 days) visit within the authorizing provider's specialty    Patient had office visit in the last 12 months or has a visit in the next 30 days with authorizing provider or within the authorizing provider's specialty.  See \"Patient Info\" tab in inbasket, or \"Choose Columns\" in Meds & Orders section of the refill encounter.           Passed - Patient is age 18 or older       Passed - No active pregnancy on record       Passed - No positive pregnancy test in past 12 months          "

## 2018-08-21 RX ORDER — SUMATRIPTAN 100 MG/1
TABLET, FILM COATED ORAL
Qty: 9 TABLET | Refills: 6 | Status: SHIPPED | OUTPATIENT
Start: 2018-08-21 | End: 2019-03-14

## 2018-08-31 DIAGNOSIS — F33.1 MODERATE EPISODE OF RECURRENT MAJOR DEPRESSIVE DISORDER (H): ICD-10-CM

## 2018-08-31 NOTE — TELEPHONE ENCOUNTER
"Requested Prescriptions   Pending Prescriptions Disp Refills     buPROPion (WELLBUTRIN XL) 300 MG 24 hr tablet [Pharmacy Med Name: BUPROPION XL 300MG TABLETS]  Last Written Prescription Date:  6/1/18  Last Fill Quantity: 90,  # refills: 0   Last office visit: 8/17/2018 with prescribing provider:  Lorna Vizcaino MD   Future Office Visit:     90 tablet 0     Sig: TAKE 1 TABLET(300 MG) BY MOUTH DAILY    SSRIs Protocol Failed    8/31/2018  3:18 AM       Failed - PHQ-9 score less than 5 in past 6 months    Please review last PHQ-9 score.   PHQ-9 SCORE 10/12/2017 1/10/2018 2/16/2018   Total Score 6 10 24     JOEY-7 SCORE 10/12/2017 1/10/2018 2/16/2018   Total Score 5 5 21                Passed - Medication is Bupropion    If the medication is Bupropion (Wellbutrin), and the patient is taking for smoking cessation; OK to refill.         Passed - Patient is age 18 or older       Passed - No active pregnancy on record       Passed - No positive pregnancy test in last 12 months       Passed - Recent (6 mo) or future (30 days) visit within the authorizing provider's specialty    Patient had office visit in the last 6 months or has a visit in the next 30 days with authorizing provider or within the authorizing provider's specialty.  See \"Patient Info\" tab in inbasket, or \"Choose Columns\" in Meds & Orders section of the refill encounter.              "

## 2018-09-05 RX ORDER — BUPROPION HYDROCHLORIDE 300 MG/1
TABLET ORAL
Qty: 90 TABLET | Refills: 0 | Status: SHIPPED | OUTPATIENT
Start: 2018-09-05 | End: 2018-11-20

## 2018-09-05 NOTE — TELEPHONE ENCOUNTER
LOV with pcp 6/1/18    Routing refill request to provider for review/approval because:  phq9 > 4 and pt due for updated phq9    Will forward to the station, please call and update pts phq9.  Thanks!

## 2018-09-14 DIAGNOSIS — F33.1 MODERATE EPISODE OF RECURRENT MAJOR DEPRESSIVE DISORDER (H): ICD-10-CM

## 2018-09-14 NOTE — TELEPHONE ENCOUNTER
LYRICA 150 MG capsule     Last Written Prescription Date:  Historical  Last Fill Quantity: ,   # refills: 0  Last Office Visit: 8/1/18  Future Office visit:       Routing refill request to provider for review/approval because:  Drug not on the [facility] refill protocol or controlled substance        sertraline (ZOLOFT) 100 MG tablet     Last Written Prescription Date:  6/1/18  Last Fill Quantity: 90,   # refills: 1  Last Office Visit: 8/1/18    Jyoti said there is no refill.    Future Office visit:       Routing refill request to provider for review/approval because:  Drug not on the [facility] refill protocol or controlled substance

## 2018-09-19 ENCOUNTER — OFFICE VISIT (OUTPATIENT)
Dept: FAMILY MEDICINE | Facility: CLINIC | Age: 33
End: 2018-09-19
Payer: COMMERCIAL

## 2018-09-19 VITALS
BODY MASS INDEX: 24.47 KG/M2 | DIASTOLIC BLOOD PRESSURE: 70 MMHG | HEART RATE: 104 BPM | RESPIRATION RATE: 16 BRPM | TEMPERATURE: 98.9 F | OXYGEN SATURATION: 95 % | SYSTOLIC BLOOD PRESSURE: 110 MMHG | WEIGHT: 133.8 LBS

## 2018-09-19 DIAGNOSIS — F41.1 GENERALIZED ANXIETY DISORDER: ICD-10-CM

## 2018-09-19 DIAGNOSIS — F33.1 MODERATE EPISODE OF RECURRENT MAJOR DEPRESSIVE DISORDER (H): ICD-10-CM

## 2018-09-19 DIAGNOSIS — N30.01 ACUTE CYSTITIS WITH HEMATURIA: Primary | ICD-10-CM

## 2018-09-19 DIAGNOSIS — M79.7 FIBROMYALGIA: ICD-10-CM

## 2018-09-19 DIAGNOSIS — R82.90 NONSPECIFIC FINDING ON EXAMINATION OF URINE: ICD-10-CM

## 2018-09-19 DIAGNOSIS — R63.4 LOSS OF WEIGHT: ICD-10-CM

## 2018-09-19 LAB
ALBUMIN UR-MCNC: 30 MG/DL
APPEARANCE UR: ABNORMAL
BACTERIA #/AREA URNS HPF: ABNORMAL /HPF
BILIRUB UR QL STRIP: NEGATIVE
COLOR UR AUTO: YELLOW
ERYTHROCYTE [DISTWIDTH] IN BLOOD BY AUTOMATED COUNT: 15 % (ref 10–15)
GLUCOSE UR STRIP-MCNC: NEGATIVE MG/DL
HCT VFR BLD AUTO: 36.3 % (ref 35–47)
HGB BLD-MCNC: 11.2 G/DL (ref 11.7–15.7)
HGB UR QL STRIP: ABNORMAL
KETONES UR STRIP-MCNC: ABNORMAL MG/DL
LEUKOCYTE ESTERASE UR QL STRIP: ABNORMAL
MCH RBC QN AUTO: 25.5 PG (ref 26.5–33)
MCHC RBC AUTO-ENTMCNC: 30.9 G/DL (ref 31.5–36.5)
MCV RBC AUTO: 83 FL (ref 78–100)
NITRATE UR QL: POSITIVE
NON-SQ EPI CELLS #/AREA URNS LPF: ABNORMAL /LPF
PH UR STRIP: 5.5 PH (ref 5–7)
PLATELET # BLD AUTO: 320 10E9/L (ref 150–450)
RBC # BLD AUTO: 4.39 10E12/L (ref 3.8–5.2)
RBC #/AREA URNS AUTO: ABNORMAL /HPF
SOURCE: ABNORMAL
SP GR UR STRIP: >1.03 (ref 1–1.03)
UROBILINOGEN UR STRIP-ACNC: 0.2 EU/DL (ref 0.2–1)
WBC # BLD AUTO: 8.1 10E9/L (ref 4–11)
WBC #/AREA URNS AUTO: ABNORMAL /HPF

## 2018-09-19 PROCEDURE — 87088 URINE BACTERIA CULTURE: CPT | Performed by: NURSE PRACTITIONER

## 2018-09-19 PROCEDURE — 84443 ASSAY THYROID STIM HORMONE: CPT | Performed by: NURSE PRACTITIONER

## 2018-09-19 PROCEDURE — 85027 COMPLETE CBC AUTOMATED: CPT | Performed by: NURSE PRACTITIONER

## 2018-09-19 PROCEDURE — 87186 SC STD MICRODIL/AGAR DIL: CPT | Performed by: NURSE PRACTITIONER

## 2018-09-19 PROCEDURE — 81001 URINALYSIS AUTO W/SCOPE: CPT | Performed by: NURSE PRACTITIONER

## 2018-09-19 PROCEDURE — 99214 OFFICE O/P EST MOD 30 MIN: CPT | Performed by: NURSE PRACTITIONER

## 2018-09-19 PROCEDURE — 87086 URINE CULTURE/COLONY COUNT: CPT | Performed by: NURSE PRACTITIONER

## 2018-09-19 PROCEDURE — 80053 COMPREHEN METABOLIC PANEL: CPT | Performed by: NURSE PRACTITIONER

## 2018-09-19 PROCEDURE — 36415 COLL VENOUS BLD VENIPUNCTURE: CPT | Performed by: NURSE PRACTITIONER

## 2018-09-19 RX ORDER — QUETIAPINE FUMARATE 100 MG/1
TABLET, FILM COATED ORAL
Qty: 60 TABLET | Refills: 0 | Status: SHIPPED | OUTPATIENT
Start: 2018-09-19 | End: 2018-10-10

## 2018-09-19 RX ORDER — SULFAMETHOXAZOLE/TRIMETHOPRIM 800-160 MG
1 TABLET ORAL 2 TIMES DAILY
Qty: 14 TABLET | Refills: 0 | Status: SHIPPED | OUTPATIENT
Start: 2018-09-19 | End: 2018-10-10

## 2018-09-19 ASSESSMENT — ANXIETY QUESTIONNAIRES
7. FEELING AFRAID AS IF SOMETHING AWFUL MIGHT HAPPEN: NEARLY EVERY DAY
3. WORRYING TOO MUCH ABOUT DIFFERENT THINGS: NEARLY EVERY DAY
IF YOU CHECKED OFF ANY PROBLEMS ON THIS QUESTIONNAIRE, HOW DIFFICULT HAVE THESE PROBLEMS MADE IT FOR YOU TO DO YOUR WORK, TAKE CARE OF THINGS AT HOME, OR GET ALONG WITH OTHER PEOPLE: VERY DIFFICULT
GAD7 TOTAL SCORE: 19
6. BECOMING EASILY ANNOYED OR IRRITABLE: NEARLY EVERY DAY
5. BEING SO RESTLESS THAT IT IS HARD TO SIT STILL: MORE THAN HALF THE DAYS
2. NOT BEING ABLE TO STOP OR CONTROL WORRYING: NEARLY EVERY DAY
1. FEELING NERVOUS, ANXIOUS, OR ON EDGE: MORE THAN HALF THE DAYS

## 2018-09-19 ASSESSMENT — PATIENT HEALTH QUESTIONNAIRE - PHQ9: 5. POOR APPETITE OR OVEREATING: NEARLY EVERY DAY

## 2018-09-19 NOTE — PROGRESS NOTES
SUBJECTIVE:   Jeannette Quesada is a 33 year old female who presents to clinic today for the following health issues:      Medication Followup of all meds    Taking Medication as prescribed: yes    Side Effects:  Depression, mood swings    Medication Helping Symptoms:  not applicable     Patient states she has had urinary odor, no pain, no discharge.  No pelvic pain.  She was treated recently for a uti with macrobid.  Thought symptoms improved but returned shortly after completing abx.    She has been losing weight.  Believes this is due to anxiety and depression.  Not sleeping well.  Not eating regularly.  No recent triggers, although she did stop smoking 2.5 months ago.  Her pain has been more controlled lately.  She continues to use marijuana and suboxone for pain.    Problem list and histories reviewed & adjusted, as indicated.  Additional history: as documented    Patient Active Problem List   Diagnosis     Migraine without aura and without status migrainosus, not intractable     Moderate episode of recurrent major depressive disorder (H)     CARDIOVASCULAR SCREENING; LDL GOAL LESS THAN 160     Fibromyalgia     Past Surgical History:   Procedure Laterality Date      SECTION       CHOLECYSTECTOMY, LAPOROSCOPIC       TUBAL LIGATION         Social History   Substance Use Topics     Smoking status: Former Smoker     Packs/day: 1.00     Types: Cigarettes     Quit date: 2018     Smokeless tobacco: Never Used      Comment: no smoking for 3 days     Alcohol use Yes      Comment: Rare     Family History   Problem Relation Age of Onset     Family History Negative Mother      Family History Negative Father            Reviewed and updated as needed this visit by clinical staff       Reviewed and updated as needed this visit by Provider         ROS:  SEE HPI.    OBJECTIVE:     /70 (BP Location: Right arm, Patient Position: Chair, Cuff Size: Adult Regular)  Pulse 104  Temp 98.9  F (37.2  C) (Tympanic)   Resp 16  Wt 133 lb 12.8 oz (60.7 kg)  SpO2 95%  BMI 24.47 kg/m2  Body mass index is 24.47 kg/(m^2).  GENERAL: healthy, alert and no distress  NECK: no adenopathy, no asymmetry, masses, or scars and thyroid normal to palpation  RESP: lungs clear to auscultation - no rales, rhonchi or wheezes  CV: regular rate and rhythm, normal S1 S2, no S3 or S4, no murmur, click or rub, no peripheral edema and peripheral pulses strong  ABDOMEN: soft, nontender, no hepatosplenomegaly, no masses and bowel sounds normal  PSYCH: mentation appears normal and anxious    Diagnostic Test Results:  Results for orders placed or performed in visit on 09/19/18 (from the past 24 hour(s))   *UA reflex to Microscopic and Culture ([facility] (except [place] and [place])   Result Value Ref Range    Color Urine Yellow     Appearance Urine Cloudy     Glucose Urine Negative NEG^Negative mg/dL    Bilirubin Urine Negative NEG^Negative    Ketones Urine Trace (A) NEG^Negative mg/dL    Specific Gravity Urine >1.030 1.003 - 1.035    Blood Urine Moderate (A) NEG^Negative    pH Urine 5.5 5.0 - 7.0 pH    Protein Albumin Urine 30 (A) NEG^Negative mg/dL    Urobilinogen Urine 0.2 0.2 - 1.0 EU/dL    Nitrite Urine Positive (A) NEG^Negative    Leukocyte Esterase Urine Large (A) NEG^Negative    Source Midstream Urine    Urine Microscopic   Result Value Ref Range    WBC Urine 25-50 (A) OTO5^0 - 5 /HPF    RBC Urine 5-10 (A) OTO2^O - 2 /HPF    Squamous Epithelial /LPF Urine Moderate (A) FEW^Few /LPF    Bacteria Urine Many (A) NEG^Negative /HPF   CBC with platelets   Result Value Ref Range    WBC 8.1 4.0 - 11.0 10e9/L    RBC Count 4.39 3.8 - 5.2 10e12/L    Hemoglobin 11.2 (L) 11.7 - 15.7 g/dL    Hematocrit 36.3 35.0 - 47.0 %    MCV 83 78 - 100 fl    MCH 25.5 (L) 26.5 - 33.0 pg    MCHC 30.9 (L) 31.5 - 36.5 g/dL    RDW 15.0 10.0 - 15.0 %    Platelet Count 320 150 - 450 10e9/L       ASSESSMENT/PLAN:   1. Acute cystitis with hematuria  Treat with  bactrim now.  Monitor.  Pt agrees with plan and verbalized understanding.  - *UA reflex to Microscopic and Culture ([facility] (except [place] and [place])  - Urine Microscopic  - sulfamethoxazole-trimethoprim (BACTRIM DS/SEPTRA DS) 800-160 MG per tablet; Take 1 tablet by mouth 2 times daily  Dispense: 14 tablet; Refill: 0  - **UA reflex to Microscopic FUTURE anytime; Future    2. Nonspecific finding on examination of urine  Repeat urine in 4 weeks.  - Urine Culture Aerobic Bacterial    3. Moderate episode of recurrent major depressive disorder (H)  Discussed concerns and options.  Would like her seen by psychiatry for medication management.  Labs today related to symptom increase as well as weight loss.  No med changes at this time.  - MENTAL HEALTH REFERRAL  - Adult; Psychiatry and Medication Management; Psychiatry; [facility]: Psychiatry Clinic [phone]; We will contact you to schedule the appointment or please call with any questions  - QUEtiapine (SEROQUEL) 100 MG tablet; TAKE 2 TABLETS BY MOUTH AT BEDTIME  Dispense: 60 tablet; Refill: 0    4. Fibromyalgia  Treated at the pain clinic.  Did have a plan to taper marijuana and suboxone but has not done this yet.  Was taking lyrica, now not covered.  Waiting for response from insurance company.  Not taking gabapentin or lyrica at this time.  Pain is more controlled lately.    5. Generalized anxiety disorder  See above.  - CBC with platelets  - Comprehensive metabolic panel  - TSH with free T4 reflex  - MENTAL HEALTH REFERRAL  - Adult; Psychiatry and Medication Management; Psychiatry; [facility]: Psychiatry Clinic [phone]; We will contact you to schedule the appointment or please call with any questions    6. Loss of weight  See above.  Could be due to mood changes.  Recheck in 3 weeks.  - CBC with platelets  - Comprehensive metabolic panel  - TSH with free T4 reflex    TONA Ford Ra, CNP  [facility] [place]

## 2018-09-19 NOTE — MR AVS SNAPSHOT
After Visit Summary   9/19/2018    Jeannette Quesada    MRN: [id number]           Patient Information     Date Of Birth          1985        Visit Information        Provider Department      9/19/2018 10:40 AM Alda Mosquera Ra, APRN CNP [facility]        Today's Diagnoses     Acute cystitis with hematuria    -  1    Nonspecific finding on examination of urine        Moderate episode of recurrent major depressive disorder (H)        Fibromyalgia        Generalized anxiety disorder        Loss of weight          Care Instructions    Schedule with psychiatry.    Treat the urinary tract infection.    We are checking labs to make sure that there isn't anything else causing your increase in symptoms.    Recheck weight in 3 weeks.          Follow-ups after your visit        Additional Services     MENTAL HEALTH REFERRAL  - Adult; Psychiatry and Medication Management; Psychiatry; [facility]: Psychiatry Clinic [phone]; We will contact you to schedule the appointment or please call with any questions       All scheduling is subject to the client's specific insurance plan & benefits, provider/location availability, and provider clinical specialities.  Please arrive 15 minutes early for your first appointment and bring your completed paperwork.    Please be aware that coverage of these services is subject to the terms and limitations of your health insurance plan.  Call member services at your health plan with any benefit or coverage questions.                            Follow-up notes from your care team     Return in about 3 weeks (around 10/10/2018) for follow up.      Future tests that were ordered for you today     Open Future Orders        Priority Expected Expires Ordered    **UA reflex to Microscopic FUTURE anytime Routine 9/19/2018 9/19/2019 9/19/2018            Who to contact     If you have questions or need follow up information about today's clinic visit or your schedule  please contact [facility] directly at [phone].  Normal or non-critical lab and imaging results will be communicated to you by MyChart, letter or phone within 4 business days after the clinic has received the results. If you do not hear from us within 7 days, please contact the clinic through [employer] or phone. If you have a critical or abnormal lab result, we will notify you by phone as soon as possible.  Submit refill requests through [employer] or call your pharmacy and they will forward the refill request to us. Please allow 3 business days for your refill to be completed.          Additional Information About Your Visit        [employer] Information     [employer] gives you secure access to your electronic health record. If you see a primary care provider, you can also send messages to your care team and make appointments. If you have questions, please call your primary care clinic.  If you do not have a primary care provider, please call [phone] and they will assist you.        Care EveryWhere ID     This is your Care EveryWhere ID. This could be used by other organizations to access your [place] medical records  [id number]        Your Vitals Were     Pulse Temperature Respirations Pulse Oximetry BMI (Body Mass Index)       104 98.9  F (37.2  C) (Tympanic) 16 95% 24.47 kg/m2        Blood Pressure from Last 3 Encounters:   09/19/18 110/70   08/17/18 100/62   06/27/18 109/60    Weight from Last 3 Encounters:   09/19/18 133 lb 12.8 oz (60.7 kg)   08/17/18 144 lb (65.3 kg)   06/27/18 145 lb (65.8 kg)              We Performed the Following     *UA reflex to Microscopic and Culture ([place] [facility] (except [place] and [place])     CBC with platelets     Comprehensive metabolic panel     MENTAL HEALTH REFERRAL  - Adult; Psychiatry and Medication Management; Psychiatry; [facility]: Psychiatry Clinic [phone]; We will contact you to schedule the appointment or please call with any  questions     TSH with free T4 reflex     Urine Culture Aerobic Bacterial     Urine Microscopic          Today's Medication Changes          These changes are accurate as of 9/19/18 11:37 AM.  If you have any questions, ask your nurse or doctor.               Start taking these medicines.        Dose/Directions    sulfamethoxazole-trimethoprim 800-160 MG per tablet   Commonly known as:  BACTRIM DS/SEPTRA DS   Used for:  Acute cystitis with hematuria   Started by:  Alda Mosquera Ra, APRN CNP        Dose:  1 tablet   Take 1 tablet by mouth 2 times daily   Quantity:  14 tablet   Refills:  0         Stop taking these medicines if you haven't already. Please contact your care team if you have questions.     LYRICA 150 MG capsule   Generic drug:  pregabalin   Stopped by:  Alda Mosquera Ra, APRN CNP           nitroFURantoin (macrocrystal-monohydrate) 100 MG capsule   Commonly known as:  MACROBID   Stopped by:  Alda Mosquera Ra, APRN CNP                Where to get your medicines      These medications were sent to [facility] [address] [facility] [email or web address] [place] [address] & [facility]  [address] [address] [facility] [address]     Phone:  [phone]     sulfamethoxazole-trimethoprim 800-160 MG per tablet                Primary Care Provider Office Phone # Fax #    TONA Ford Ra, [phone] [address] [facility] [address]        Equal Access to Services     [facility]: Hadii desmond lazo hadasho Soomaali, waaxda luqadaha, qaybta kaalmada salyada, teresa thomas . So [facility] [phone].    ATENCIÓN: Si habla español, tiene a modi disposición servicios gratuitos de asistencia lingüística. [place] al [phone].    We comply with applicable federal civil rights laws and [place] laws. We do not discriminate on the basis of race, color, national origin, age, disability, sex, sexual orientation, or gender identity.             Thank you!     Thank you for choosing [facility] [place]  for your care. Our goal is always to provide you with excellent care. Hearing back from our patients is one way we can continue to improve our services. Please take a few minutes to complete the written survey that you may receive in the mail after your visit with us. Thank you!             Your Updated Medication List - Protect others around you: Learn how to safely use, store and throw away your medicines at www.disposemymeds.org.          This list is accurate as of 9/19/18 11:37 AM.  Always use your most recent med list.                   Brand Name Dispense Instructions for use Diagnosis    buPROPion 300 MG 24 hr tablet    WELLBUTRIN XL    90 tablet    TAKE 1 TABLET(300 MG) BY MOUTH DAILY    Moderate episode of recurrent major depressive disorder (H)       gabapentin 300 MG capsule    NEURONTIN    90 capsule    Take 1 tablet (300 mg) every night for 1-3 days, then 1 tablet twice daily for 1-3 days, then 1 tablet three times daily    Myalgia       QUEtiapine 100 MG tablet    SEROquel    60 tablet    TAKE 2 TABLETS BY MOUTH AT BEDTIME    Moderate episode of recurrent major depressive disorder (H)       sertraline 100 MG tablet    ZOLOFT    90 tablet    Take 1 tablet by mouth daily.    Moderate episode of recurrent major depressive disorder (H)       sulfamethoxazole-trimethoprim 800-160 MG per tablet    BACTRIM DS/SEPTRA DS    14 tablet    Take 1 tablet by mouth 2 times daily    Acute cystitis with hematuria       * SUMAtriptan 100 MG tablet    IMITREX     TK 1 T PO PRN FOR MIGRAINE. MAY REPEAT AFTER 1-2 HOURS IF INEFFECTIVE. MAX 2 T PER 24 HOURS.        * SUMAtriptan 100 MG tablet    IMITREX    9 tablet    TAKE 1 TABLET(100 MG) BY MOUTH AT ONSET OF HEADACHE FOR MIGRAINE. MAY REPEAT IN 2 HOURS. MAX 2 TABLETS/ 24 HOURS    Migraine without aura and without status migrainosus, not intractable       * Notice:  This list has 2 medication(s) that are  the same as other medications prescribed for you. Read the directions carefully, and ask your doctor or other care provider to review them with you.

## 2018-09-19 NOTE — PATIENT INSTRUCTIONS
Schedule with psychiatry.    Treat the urinary tract infection.    We are checking labs to make sure that there isn't anything else causing your increase in symptoms.    Recheck weight in 3 weeks.

## 2018-09-20 LAB
ALBUMIN SERPL-MCNC: 4.5 G/DL (ref 3.4–5)
ALP SERPL-CCNC: 68 U/L (ref 40–150)
ALT SERPL W P-5'-P-CCNC: 16 U/L (ref 0–50)
ANION GAP SERPL CALCULATED.3IONS-SCNC: 6 MMOL/L (ref 3–14)
AST SERPL W P-5'-P-CCNC: 20 U/L (ref 0–45)
BILIRUB SERPL-MCNC: 0.4 MG/DL (ref 0.2–1.3)
BUN SERPL-MCNC: 14 MG/DL (ref 7–30)
CALCIUM SERPL-MCNC: 9 MG/DL (ref 8.5–10.1)
CHLORIDE SERPL-SCNC: 101 MMOL/L (ref 94–109)
CO2 SERPL-SCNC: 30 MMOL/L (ref 20–32)
CREAT SERPL-MCNC: 0.76 MG/DL (ref 0.52–1.04)
GFR SERPL CREATININE-BSD FRML MDRD: 88 ML/MIN/1.7M2
GLUCOSE SERPL-MCNC: 84 MG/DL (ref 70–99)
POTASSIUM SERPL-SCNC: 4.3 MMOL/L (ref 3.4–5.3)
PROT SERPL-MCNC: 7.9 G/DL (ref 6.8–8.8)
SODIUM SERPL-SCNC: 137 MMOL/L (ref 133–144)
TSH SERPL DL<=0.005 MIU/L-ACNC: 0.78 MU/L (ref 0.4–4)

## 2018-09-20 RX ORDER — SERTRALINE HYDROCHLORIDE 100 MG/1
TABLET, FILM COATED ORAL
Qty: 90 TABLET | Refills: 1 | Status: SHIPPED | OUTPATIENT
Start: 2018-09-20 | End: 2019-05-10

## 2018-09-20 RX ORDER — PREGABALIN 150 MG/1
CAPSULE ORAL
Qty: 60 CAPSULE | Refills: 0 | OUTPATIENT
Start: 2018-09-20

## 2018-09-20 ASSESSMENT — ANXIETY QUESTIONNAIRES: GAD7 TOTAL SCORE: 19

## 2018-09-20 ASSESSMENT — PATIENT HEALTH QUESTIONNAIRE - PHQ9: SUM OF ALL RESPONSES TO PHQ QUESTIONS 1-9: 17

## 2018-09-20 NOTE — TELEPHONE ENCOUNTER
Prescription approved per [facility] Refill Protocol.    Approved Sertraline, denies Lyrica, per LOV note, Pt is no longer taking.     Yumiko Aranda RN -- [facility]

## 2018-09-21 LAB
BACTERIA SPEC CULT: ABNORMAL
SPECIMEN SOURCE: ABNORMAL

## 2018-09-24 DIAGNOSIS — D64.9 ANEMIA, UNSPECIFIED TYPE: Primary | ICD-10-CM

## 2018-09-24 PROBLEM — F41.1 GENERALIZED ANXIETY DISORDER: Status: ACTIVE | Noted: 2018-09-24

## 2018-09-27 DIAGNOSIS — F33.1 MODERATE EPISODE OF RECURRENT MAJOR DEPRESSIVE DISORDER (H): ICD-10-CM

## 2018-09-27 RX ORDER — QUETIAPINE FUMARATE 100 MG/1
TABLET, FILM COATED ORAL
Qty: 60 TABLET | Refills: 0 | OUTPATIENT
Start: 2018-09-27

## 2018-09-27 NOTE — TELEPHONE ENCOUNTER
Requested Prescriptions   Pending Prescriptions Disp Refills     QUEtiapine (SEROQUEL) 100 MG tablet [Pharmacy Med Name: QUETIAPINE 100MG TABLETS]  Last Written Prescription Date:  9/19/18  Last Fill Quantity: 60,  # refills: 0   Last office visit: 9/19/2018 with prescribing provider:  Alda Mosquera Ra, APRN CNP    Future Office Visit:   Next 5 appointments (look out 90 days)     Oct 10, 2018 10:40 AM CDT   SHORT with TONA Ford Ra, CNP   [facility]    [address] [facility] [address] [phone]                  60 tablet 0     Sig: TAKE 2 TABLETS BY MOUTH AT BEDTIME    Antipsychotic Medications Failed    9/27/2018  3:20 AM       Failed - Lipid panel on file within the past 12 months    No lab results found.           Passed - Blood pressure under 140/90 in past 12 months    BP Readings from Last 3 Encounters:   09/19/18 110/70   08/17/18 100/62   06/27/18 109/60                Passed - Patient is 12 years of age or older       Passed - CBC on file in past 12 months    Recent Labs   Lab Test  09/19/18   1144   WBC  8.1   RBC  4.39   HGB  11.2*   HCT  36.3   PLT  320       For [facility] ONLY: [id number] = WBC, [id number] = RBC         Passed - Heart Rate on file within past 12 months    Pulse Readings from Last 3 Encounters:   09/19/18 104   08/17/18 78   06/27/18 80              Passed - A1c or Glucose on file in past 12 months    Recent Labs   Lab Test  09/19/18   1144   GLC  84       Please review patients last 3 weights. If a weight gain of >10 lbs exists, you may refill the prescription once after instructing the patient to schedule an appointment within the next 30 days.    Wt Readings from Last 3 Encounters:   09/19/18 133 lb 12.8 oz (60.7 kg)   08/17/18 144 lb (65.3 kg)   06/27/18 145 lb (65.8 kg)            Passed - Patient is not pregnant       Passed - No positve pregnancy test on file in past 12 months       Passed - Recent (6 mo) or future  "(30 days) visit within the authorizing provider's specialty    Patient had office visit in the last 6 months or has a visit in the next 30 days with authorizing provider or within the authorizing provider's specialty.  See \"Patient Info\" tab in inbasket, or \"Choose Columns\" in Meds & Orders section of the refill encounter.              "

## 2018-10-10 ENCOUNTER — OFFICE VISIT (OUTPATIENT)
Dept: FAMILY MEDICINE | Facility: CLINIC | Age: 33
End: 2018-10-10
Payer: COMMERCIAL

## 2018-10-10 VITALS
SYSTOLIC BLOOD PRESSURE: 92 MMHG | HEART RATE: 86 BPM | BODY MASS INDEX: 25.06 KG/M2 | OXYGEN SATURATION: 96 % | DIASTOLIC BLOOD PRESSURE: 58 MMHG | WEIGHT: 136.2 LBS | HEIGHT: 62 IN | TEMPERATURE: 98.5 F | RESPIRATION RATE: 18 BRPM

## 2018-10-10 DIAGNOSIS — Z23 NEED FOR PROPHYLACTIC VACCINATION AND INOCULATION AGAINST INFLUENZA: ICD-10-CM

## 2018-10-10 DIAGNOSIS — Z23 NEED FOR VACCINATION: ICD-10-CM

## 2018-10-10 DIAGNOSIS — F33.1 MODERATE EPISODE OF RECURRENT MAJOR DEPRESSIVE DISORDER (H): Primary | ICD-10-CM

## 2018-10-10 DIAGNOSIS — M79.7 FIBROMYALGIA: ICD-10-CM

## 2018-10-10 PROCEDURE — 99213 OFFICE O/P EST LOW 20 MIN: CPT | Mod: 25 | Performed by: NURSE PRACTITIONER

## 2018-10-10 PROCEDURE — 90472 IMMUNIZATION ADMIN EACH ADD: CPT | Performed by: NURSE PRACTITIONER

## 2018-10-10 PROCEDURE — 90715 TDAP VACCINE 7 YRS/> IM: CPT | Performed by: NURSE PRACTITIONER

## 2018-10-10 PROCEDURE — 90471 IMMUNIZATION ADMIN: CPT | Performed by: NURSE PRACTITIONER

## 2018-10-10 PROCEDURE — 90686 IIV4 VACC NO PRSV 0.5 ML IM: CPT | Performed by: NURSE PRACTITIONER

## 2018-10-10 RX ORDER — QUETIAPINE FUMARATE 100 MG/1
TABLET, FILM COATED ORAL
Qty: 60 TABLET | Refills: 0 | Status: SHIPPED | OUTPATIENT
Start: 2018-10-10 | End: 2018-11-12

## 2018-10-10 RX ORDER — PREGABALIN 150 MG/1
CAPSULE ORAL
Qty: 60 CAPSULE | Refills: 0 | Status: SHIPPED | OUTPATIENT
Start: 2018-10-10 | End: 2018-11-12

## 2018-10-10 RX ORDER — PREGABALIN 150 MG/1
CAPSULE ORAL
Refills: 0 | COMMUNITY
Start: 2018-09-14 | End: 2018-10-10

## 2018-10-10 ASSESSMENT — ANXIETY QUESTIONNAIRES
3. WORRYING TOO MUCH ABOUT DIFFERENT THINGS: MORE THAN HALF THE DAYS
7. FEELING AFRAID AS IF SOMETHING AWFUL MIGHT HAPPEN: SEVERAL DAYS
IF YOU CHECKED OFF ANY PROBLEMS ON THIS QUESTIONNAIRE, HOW DIFFICULT HAVE THESE PROBLEMS MADE IT FOR YOU TO DO YOUR WORK, TAKE CARE OF THINGS AT HOME, OR GET ALONG WITH OTHER PEOPLE: VERY DIFFICULT
6. BECOMING EASILY ANNOYED OR IRRITABLE: SEVERAL DAYS
2. NOT BEING ABLE TO STOP OR CONTROL WORRYING: MORE THAN HALF THE DAYS
GAD7 TOTAL SCORE: 11
5. BEING SO RESTLESS THAT IT IS HARD TO SIT STILL: SEVERAL DAYS
1. FEELING NERVOUS, ANXIOUS, OR ON EDGE: MORE THAN HALF THE DAYS

## 2018-10-10 ASSESSMENT — PATIENT HEALTH QUESTIONNAIRE - PHQ9: 5. POOR APPETITE OR OVEREATING: MORE THAN HALF THE DAYS

## 2018-10-10 NOTE — MR AVS SNAPSHOT
After Visit Summary   10/10/2018    Jeannette Quesada    MRN: [id number]           Patient Information     Date Of Birth          1985        Visit Information        Provider Department      10/10/2018 10:40 AM Alda Mosquera Ra, APRN CNP [facility]        Today's Diagnoses     Need for prophylactic vaccination and inoculation against influenza    -  1    Need for vaccination        Fibromyalgia        Moderate episode of recurrent major depressive disorder (H)          Care Instructions    Continue with your current regimen.  I refilled your lyrica for you.    See me in 3 months.          Follow-ups after your visit        Follow-up notes from your care team     Return in about 3 months (around 1/10/2019) for follow up.      Who to contact     If you have questions or need follow up information about today's clinic visit or your schedule please contact [facility] directly at [phone].  Normal or non-critical lab and imaging results will be communicated to you by MyChart, letter or phone within 4 business days after the clinic has received the results. If you do not hear from us within 7 days, please contact the clinic through hi5hart or phone. If you have a critical or abnormal lab result, we will notify you by phone as soon as possible.  Submit refill requests through [employer]Something or call your pharmacy and they will forward the refill request to us. Please allow 3 business days for your refill to be completed.          Additional Information About Your Visit        MyChart Information     [employer] gives you secure access to your electronic health record. If you see a primary care provider, you can also send messages to your care team and make appointments. If you have questions, please call your primary care clinic.  If you do not have a primary care provider, please call [phone] and they will assist you.        Care EveryWhere ID     This is your Care  "EveryWhere ID. This could be used by other organizations to access your [place] medical records  [id number]        Your Vitals Were     Pulse Temperature Respirations Height Pulse Oximetry BMI (Body Mass Index)    86 98.5  F (36.9  C) (Tympanic) 18 5' 2\" (1.575 m) 96% 24.91 kg/m2       Blood Pressure from Last 3 Encounters:   10/10/18 92/58   09/19/18 110/70   08/17/18 100/62    Weight from Last 3 Encounters:   10/10/18 136 lb 3.2 oz (61.8 kg)   09/19/18 133 lb 12.8 oz (60.7 kg)   08/17/18 144 lb (65.3 kg)              We Performed the Following     EA ADD'L VACCINE     FLU VACCINE, SPLIT VIRUS, IM (QUADRIVALENT) [29273]- >3 YRS     SCREENING QUESTIONS FOR ADULT IMMUNIZATIONS     TDAP VACCINE (ADACEL)     Vaccine Administration, Initial [18198]          Today's Medication Changes          These changes are accurate as of 10/10/18 11:17 AM.  If you have any questions, ask your nurse or doctor.               These medicines have changed or have updated prescriptions.        Dose/Directions    LYRICA 150 MG capsule   This may have changed:  See the new instructions.   Used for:  Fibromyalgia   Generic drug:  pregabalin   Changed by:  Alda Mosquera Ra, APRN CNP        Take 1 capsule twice daily.   Quantity:  60 capsule   Refills:  0         Stop taking these medicines if you haven't already. Please contact your care team if you have questions.     gabapentin 300 MG capsule   Commonly known as:  NEURONTIN   Stopped by:  Alda Mosquera Ra, APRN CNP                Where to get your medicines      Some of these will need a paper prescription and others can be bought over the counter.  Ask your nurse if you have questions.     Bring a paper prescription for each of these medications     LYRICA 150 MG capsule                Primary Care Provider Office Phone # Fax #    TONA Ford Ra, [phone] [address] [address] [address]        Equal Access to Services     PEPPER VASQUEZ AH: " Hadii aad ku hadluiso Soomaali, waaxda luqadaha, qaybta kaalmada adeclaudy, teresa nilsonphan carvalho latristinlinwood sarah. So [facility] [phone].    ATENCIÓN: Si julian marks, tiene a modi disposición servicios gratuitos de asistencia lingüística. Llame al [phone].    We comply with applicable federal civil rights laws and [place] laws. We do not discriminate on the basis of race, color, national origin, age, disability, sex, sexual orientation, or gender identity.            Thank you!     Thank you for choosing [facility] [place]  for your care. Our goal is always to provide you with excellent care. Hearing back from our patients is one way we can continue to improve our services. Please take a few minutes to complete the written survey that you may receive in the mail after your visit with us. Thank you!             Your Updated Medication List - Protect others around you: Learn how to safely use, store and throw away your medicines at www.disposemymeds.org.          This list is accurate as of 10/10/18 11:17 AM.  Always use your most recent med list.                   Brand Name Dispense Instructions for use Diagnosis    buPROPion 300 MG 24 hr tablet    WELLBUTRIN XL    90 tablet    TAKE 1 TABLET(300 MG) BY MOUTH DAILY    Moderate episode of recurrent major depressive disorder (H)       LYRICA 150 MG capsule   Generic drug:  pregabalin     60 capsule    Take 1 capsule twice daily.    Fibromyalgia       QUEtiapine 100 MG tablet    SEROquel    60 tablet    TAKE 2 TABLETS BY MOUTH AT BEDTIME    Moderate episode of recurrent major depressive disorder (H)       sertraline 100 MG tablet    ZOLOFT    90 tablet    Take 1 tablet by mouth daily.    Moderate episode of recurrent major depressive disorder (H)       * SUMAtriptan 100 MG tablet    IMITREX     TK 1 T PO PRN FOR MIGRAINE. MAY REPEAT AFTER 1-2 HOURS IF INEFFECTIVE. MAX 2 T PER 24 HOURS.        * SUMAtriptan 100 MG tablet    IMITREX    9 tablet    TAKE 1  TABLET(100 MG) BY MOUTH AT ONSET OF HEADACHE FOR MIGRAINE. MAY REPEAT IN 2 HOURS. MAX 2 TABLETS/ 24 HOURS    Migraine without aura and without status migrainosus, not intractable       * Notice:  This list has 2 medication(s) that are the same as other medications prescribed for you. Read the directions carefully, and ask your doctor or other care provider to review them with you.

## 2018-10-10 NOTE — PROGRESS NOTES
SUBJECTIVE:   Jeannette Quesada is a 33 year old female who presents to clinic today for the following health issues:      Depression and Anxiety Follow-Up    Status since last visit: About the same    Other associated symptoms:None    Complicating factors:     Significant life event: No     Current substance abuse: Cannabis    PHQ 1/10/2018 2018 2018   PHQ-9 Total Score 10 24 17   Q9: Suicide Ideation Several days Nearly every day Not at all   F/U: Thoughts of suicide or self-harm - No -   F/U: Safety concerns - No -     JOEY-7 SCORE 1/10/2018 2018 2018   Total Score 5 21 19       PHQ-9  English  PHQ-9   Any Language  JOEY-7  Suicide Assessment Five-step Evaluation and Treatment (SAFE-T)    Pt presents for follow up.  Seeing a therapist at the [facility] clinic now.  Feeling improved overall.  She is going to continue weekly appts with her.  She is eating more, no longer having GI distress.  Does not want to change medications - instead focus on therapy.  She did get the approval from insurance to start lyrica.  She is taking 150mg BID now.  Does not have anything scheduled with the pain clinic.  Would like to wait on this and put energy and time into therapy.    Problem list and histories reviewed & adjusted, as indicated.  Additional history: as documented    Patient Active Problem List   Diagnosis     Migraine without aura and without status migrainosus, not intractable     Moderate episode of recurrent major depressive disorder (H)     CARDIOVASCULAR SCREENING; LDL GOAL LESS THAN 160     Fibromyalgia     Generalized anxiety disorder     Past Surgical History:   Procedure Laterality Date      SECTION       CHOLECYSTECTOMY, LAPOROSCOPIC       TUBAL LIGATION         Social History   Substance Use Topics     Smoking status: Former Smoker     Packs/day: 1.00     Types: Cigarettes     Quit date: 2018     Smokeless tobacco: Never Used      Comment: no smoking for 3 days     Alcohol use Yes  "     Comment: Rare     Family History   Problem Relation Age of Onset     Family History Negative Mother      Family History Negative Father            Reviewed and updated as needed this visit by clinical staff       Reviewed and updated as needed this visit by Provider         ROS:  SEE HPI.    OBJECTIVE:     BP 92/58 (BP Location: Right arm, Patient Position: Chair, Cuff Size: Adult Regular)  Pulse 86  Temp 98.5  F (36.9  C) (Tympanic)  Resp 18  Ht 5' 2\" (1.575 m)  Wt 136 lb 3.2 oz (61.8 kg)  SpO2 96%  BMI 24.91 kg/m2  Body mass index is 24.91 kg/(m^2).  GENERAL: healthy, alert and no distress  PSYCH: mentation appears normal, affect normal/bright    Diagnostic Test Results:  none     ASSESSMENT/PLAN:   1. Moderate episode of recurrent major depressive disorder (H)  Stable, continue with therapy.  Weight has stabilized.  Refilled medication.  - QUEtiapine (SEROQUEL) 100 MG tablet; TAKE 2 TABLETS BY MOUTH AT BEDTIME  Dispense: 60 tablet; Refill: 0    2. Fibromyalgia  Refilled for pt.  She will continue to fill through me.  Unsure of pain clinic follow up at this time.  Pt agrees with plan and verbalized understanding.  - LYRICA 150 MG capsule; Take 1 capsule twice daily.  Dispense: 60 capsule; Refill: 0    3. Need for prophylactic vaccination and inoculation against influenza  - FLU VACCINE, SPLIT VIRUS, IM (QUADRIVALENT) [88940]- >3 YRS  - Vaccine Administration, Initial [73494]    4. Need for vaccination  - TDAP VACCINE (ADACEL)  - EA ADD'L VACCINE  - SCREENING QUESTIONS FOR ADULT IMMUNIZATIONS    TONA Ford Ra [facility]    Injectable Influenza Immunization Documentation    1.  Is the person to be vaccinated sick today?   No    2. Does the person to be vaccinated have an allergy to a component   of the vaccine?   No  Egg Allergy Algorithm Link    3. Has the person to be vaccinated ever had a serious reaction   to influenza vaccine in the past?   No    4. Has the person to " be vaccinated ever had Guillain-Barré syndrome?   No    Form completed by Philly Borges, [facility]

## 2018-10-11 ASSESSMENT — PATIENT HEALTH QUESTIONNAIRE - PHQ9: SUM OF ALL RESPONSES TO PHQ QUESTIONS 1-9: 11

## 2018-10-11 ASSESSMENT — ANXIETY QUESTIONNAIRES: GAD7 TOTAL SCORE: 11

## 2018-10-24 DIAGNOSIS — D64.9 ANEMIA, UNSPECIFIED TYPE: ICD-10-CM

## 2018-10-24 LAB
ERYTHROCYTE [DISTWIDTH] IN BLOOD BY AUTOMATED COUNT: 15.3 % (ref 10–15)
HCT VFR BLD AUTO: 38 % (ref 35–47)
HGB BLD-MCNC: 11.5 G/DL (ref 11.7–15.7)
MCH RBC QN AUTO: 25.2 PG (ref 26.5–33)
MCHC RBC AUTO-ENTMCNC: 30.3 G/DL (ref 31.5–36.5)
MCV RBC AUTO: 83 FL (ref 78–100)
PLATELET # BLD AUTO: 288 10E9/L (ref 150–450)
RBC # BLD AUTO: 4.56 10E12/L (ref 3.8–5.2)
WBC # BLD AUTO: 5.2 10E9/L (ref 4–11)

## 2018-10-24 PROCEDURE — 36415 COLL VENOUS BLD VENIPUNCTURE: CPT | Performed by: NURSE PRACTITIONER

## 2018-10-24 PROCEDURE — 83540 ASSAY OF IRON: CPT | Performed by: NURSE PRACTITIONER

## 2018-10-24 PROCEDURE — 82728 ASSAY OF FERRITIN: CPT | Performed by: NURSE PRACTITIONER

## 2018-10-24 PROCEDURE — 85027 COMPLETE CBC AUTOMATED: CPT | Performed by: NURSE PRACTITIONER

## 2018-10-24 PROCEDURE — 82607 VITAMIN B-12: CPT | Performed by: NURSE PRACTITIONER

## 2018-10-24 PROCEDURE — 83550 IRON BINDING TEST: CPT | Performed by: NURSE PRACTITIONER

## 2018-10-25 LAB
FERRITIN SERPL-MCNC: 8 NG/ML (ref 12–150)
IRON SATN MFR SERPL: 4 % (ref 15–46)
IRON SERPL-MCNC: 16 UG/DL (ref 35–180)
TIBC SERPL-MCNC: 428 UG/DL (ref 240–430)
VIT B12 SERPL-MCNC: 282 PG/ML (ref 193–986)

## 2018-11-12 DIAGNOSIS — F33.1 MODERATE EPISODE OF RECURRENT MAJOR DEPRESSIVE DISORDER (H): ICD-10-CM

## 2018-11-12 DIAGNOSIS — M79.7 FIBROMYALGIA: ICD-10-CM

## 2018-11-12 RX ORDER — PREGABALIN 150 MG/1
CAPSULE ORAL
Qty: 60 CAPSULE | Refills: 0 | Status: SHIPPED | OUTPATIENT
Start: 2018-11-12 | End: 2018-12-21

## 2018-11-12 NOTE — TELEPHONE ENCOUNTER
Duplicate?  Requested Prescriptions   Pending Prescriptions Disp Refills     QUEtiapine (SEROQUEL) 100 MG tablet [Pharmacy Med Name: QUETIAPINE 100MG TABLETS]  Last Written Prescription Date:  10/10/18  Last Fill Quantity: 60,  # refills: 0   Last office visit: 10/10/2018 with prescribing provider:  Alda Mosquera Ra, APRN CNP    Future Office Visit:   Next 5 appointments (look out 90 days)     Jan 09, 2019 11:00 AM CST   Office Visit with TONA Ford Ra, CNP   [facility]    [address] [facility] [address] [phone]                  60 tablet 0     Sig: TAKE 2 TABLETS BY MOUTH AT BEDTIME    Antipsychotic Medications Failed    11/12/2018  9:54 AM       Failed - Lipid panel on file within the past 12 months    No lab results found.           Passed - Blood pressure under 140/90 in past 12 months    BP Readings from Last 3 Encounters:   10/10/18 92/58   09/19/18 110/70   08/17/18 100/62                Passed - Patient is 12 years of age or older       Passed - CBC on file in past 12 months    Recent Labs   Lab Test  10/24/18   1104   WBC  5.2   RBC  4.56   HGB  11.5*   HCT  38.0   PLT  288                Passed - Heart Rate on file within past 12 months    Pulse Readings from Last 3 Encounters:   10/10/18 86   09/19/18 104   08/17/18 78              Passed - A1c or Glucose on file in past 12 months    Recent Labs   Lab Test  09/19/18   1144   GLC  84       Please review patients last 3 weights. If a weight gain of >10 lbs exists, you may refill the prescription once after instructing the patient to schedule an appointment within the next 30 days.    Wt Readings from Last 3 Encounters:   10/10/18 136 lb 3.2 oz (61.8 kg)   09/19/18 133 lb 12.8 oz (60.7 kg)   08/17/18 144 lb (65.3 kg)            Passed - Patient is not pregnant       Passed - No positve pregnancy test on file in past 12 months       Passed - Recent (6 mo) or future (30 days) visit  "within the authorizing provider's specialty    Patient had office visit in the last 6 months or has a visit in the next 30 days with authorizing provider or within the authorizing provider's specialty.  See \"Patient Info\" tab in inbasket, or \"Choose Columns\" in Meds & Orders section of the refill encounter.              "

## 2018-11-12 NOTE — TELEPHONE ENCOUNTER
Requested Prescriptions   Pending Prescriptions Disp Refills     LYRICA 150 MG capsule [Pharmacy Med Name: LYRICA 150MG CAPSULES] 60 capsule 0     Sig: TAKE 1 CAPSULE BY MOUTH TWICE DAILY    There is no refill protocol information for this order        Last Written Prescription Date:  10/10/18  Last Fill Quantity: 60,  # refills: 0   Last office visit: 10/10/2018 with prescribing provider:  Alda Mosquera Ra, APRN CNP   Future Office Visit:   Next 5 appointments (look out 90 days)     Jan 09, 2019 11:00 AM CST   Office Visit with TONA Ford Ra, CNP   [facility]    [address] [facility] [address] [phone]

## 2018-11-14 RX ORDER — QUETIAPINE FUMARATE 100 MG/1
TABLET, FILM COATED ORAL
Qty: 180 TABLET | Refills: 0 | Status: SHIPPED | OUTPATIENT
Start: 2018-11-14 | End: 2019-01-09

## 2018-11-15 ENCOUNTER — TELEPHONE (OUTPATIENT)
Dept: FAMILY MEDICINE | Facility: CLINIC | Age: 33
End: 2018-11-15

## 2018-11-15 ENCOUNTER — NURSE TRIAGE (OUTPATIENT)
Dept: NURSING | Facility: CLINIC | Age: 33
End: 2018-11-15

## 2018-11-15 NOTE — TELEPHONE ENCOUNTER
Markell is calling and states that he gave Sertraline 2 times a day by accident instead of one to wife Jeannette and now Jeannette is out of Sertraline and pharmacy told  Markell cannot fill for six days.  Markell is calling and requesting a refill for today as Jeannette is becoming very depressed and crying.  Please phone Markell at [phone].

## 2018-11-15 NOTE — TELEPHONE ENCOUNTER
Okay to fill early please see if pharmacy can help with this.  KYRIE.        Clinic Action Needed:  Yes, callback  FNA Triage Call  Presenting Problem:      Markell is calling and states that he gave Sertraline 2 times a day by accident instead of one to wife Jeannette and now Jeannette is out of Sertraline and pharmacy told  Markell cannot fill for six days.  Markell is calling and requesting a refill for today as Jeannette is becoming very depressed and crying.  Please phone Markell at [phone].       Routed to:  RN Pool  Please be sure to close this encounter once this patient's issue/question has been addressed.     Yoselin Caceres RN/[facility]

## 2018-11-15 NOTE — TELEPHONE ENCOUNTER
"Spoke with pharmacy. Patient can pay out of pocket for a few pills until insurance will fill or call insurance for a \"lost medication over ride\".    Markell informed and will call insurance.    Amina Duarte RN    "

## 2018-11-20 DIAGNOSIS — F33.1 MODERATE EPISODE OF RECURRENT MAJOR DEPRESSIVE DISORDER (H): ICD-10-CM

## 2018-11-21 NOTE — TELEPHONE ENCOUNTER
"Requested Prescriptions   Pending Prescriptions Disp Refills     buPROPion (WELLBUTRIN XL) 300 MG 24 hr tablet [Pharmacy Med Name: BUPROPION XL 300MG TABLETS]  Last Written Prescription Date:  9/5/18  Last Fill Quantity: 90,  # refills: 0   Last office visit: 10/10/2018 with prescribing provider:  Alda Mosquera Ra, APRN CNP    Future Office Visit:   Next 5 appointments (look out 90 days)     Jan 09, 2019 11:00 AM CST   Office Visit with TONA Ford Ra, CNP   [address] [address] [phone]                  90 tablet 0     Sig: TAKE 1 TABLET BY MOUTH DAILY    SSRIs Protocol Failed    11/20/2018  5:57 PM       Failed - PHQ-9 score less than 5 in past 6 months    Please review last PHQ-9 score.   PHQ-9 SCORE 2/16/2018 9/19/2018 10/10/2018   Total Score 24 17 11     JOEY-7 SCORE 2/16/2018 9/19/2018 10/10/2018   Total Score 21 19 11              Passed - Medication is Bupropion    If the medication is Bupropion (Wellbutrin), and the patient is taking for smoking cessation; OK to refill.         Passed - Patient is age 18 or older       Passed - No active pregnancy on record       Passed - No positive pregnancy test in last 12 months       Passed - Recent (6 mo) or future (30 days) visit within the authorizing provider's specialty    Patient had office visit in the last 6 months or has a visit in the next 30 days with authorizing provider or within the authorizing provider's specialty.  See \"Patient Info\" tab in inbasket, or \"Choose Columns\" in Meds & Orders section of the refill encounter.              "

## 2018-11-23 RX ORDER — BUPROPION HYDROCHLORIDE 300 MG/1
TABLET ORAL
Qty: 60 TABLET | Refills: 0 | Status: SHIPPED | OUTPATIENT
Start: 2018-11-23 | End: 2019-01-09

## 2018-12-21 ENCOUNTER — NURSE TRIAGE (OUTPATIENT)
Dept: NURSING | Facility: CLINIC | Age: 33
End: 2018-12-21

## 2018-12-21 DIAGNOSIS — M79.7 FIBROMYALGIA: ICD-10-CM

## 2018-12-22 NOTE — TELEPHONE ENCOUNTER
"Caller is requesting a refill of Lyrica  ; requested today and routed to provider without response  Advised to go to  if she cannot wait  until clinic open to get RX  Understands ans will comply   Gissel Macdonald RN  FNA      Reason for Disposition    Caller requesting a NON-URGENT new prescription or refill and triager unable to refill per unit policy    Additional Information    Negative: Drug overdose and nurse unable to answer question    Negative: Caller requesting information not related to medicine    Negative: Caller requesting a prescription for Strep throat and has a positive culture result    Negative: Rash while taking a medication or within 3 days of stopping it    Negative: Immunization reaction suspected    Negative: [1] Asthma and [2] having symptoms of asthma (cough, wheezing, etc)    Negative: MORE THAN A DOUBLE DOSE of a prescription or over-the-counter (OTC) drug    Negative: [1] DOUBLE DOSE (an extra dose or lesser amount) of over-the-counter (OTC) drug AND [2] any symptoms (e.g., dizziness, nausea, pain, sleepiness)    Negative: [1] DOUBLE DOSE (an extra dose or lesser amount) of prescription drug AND [2] any symptoms (e.g., dizziness, nausea, pain, sleepiness)    Negative: Took another person's prescription drug    Negative: [1] DOUBLE DOSE (an extra dose or lesser amount) of prescription drug AND [2] NO symptoms (Exception: a double dose of antibiotics)    Negative: Diabetes drug error or overdose (e.g., insulin or extra dose)    Negative: [1] Request for URGENT new prescription or refill of \"essential\" medication (i.e., likelihood of harm to patient if not taken) AND [2] triager unable to fill per unit policy    Negative: [1] Prescription not at pharmacy AND [2] was prescribed today by PCP    Negative: Pharmacy calling with prescription questions and triager unable to answer question    Negative: Caller has URGENT medication question about med that PCP prescribed and triager unable to " answer question    Protocols used: MEDICATION QUESTION CALL-ADULT-AH

## 2018-12-23 NOTE — TELEPHONE ENCOUNTER
Lyrica       Last Written Prescription Date:  11/12/2018  Last Fill Quantity: 60,   # refills: 0  Last Office Visit: 10/10/2018  Future Office visit:    Next 5 appointments (look out 90 days)    Jan 09, 2019 11:00 AM CST  Office Visit with TONA Ford Ra, CNP  [facility] [address] [place] [address] [phone]           Routing refill request to provider for review/approval because:  Drug not on the [facility], [facility] or  Health refill protocol or controlled substance

## 2018-12-24 RX ORDER — PREGABALIN 150 MG/1
CAPSULE ORAL
Qty: 60 CAPSULE | Refills: 0 | Status: SHIPPED | OUTPATIENT
Start: 2018-12-24 | End: 2019-01-09

## 2019-01-09 ENCOUNTER — OFFICE VISIT (OUTPATIENT)
Dept: FAMILY MEDICINE | Facility: CLINIC | Age: 34
End: 2019-01-09
Payer: COMMERCIAL

## 2019-01-09 DIAGNOSIS — M79.7 FIBROMYALGIA: ICD-10-CM

## 2019-01-09 DIAGNOSIS — F33.1 MODERATE EPISODE OF RECURRENT MAJOR DEPRESSIVE DISORDER (H): ICD-10-CM

## 2019-01-09 DIAGNOSIS — R35.0 URINARY FREQUENCY: Primary | ICD-10-CM

## 2019-01-09 LAB
ALBUMIN UR-MCNC: NEGATIVE MG/DL
APPEARANCE UR: ABNORMAL
BACTERIA #/AREA URNS HPF: ABNORMAL /HPF
BILIRUB UR QL STRIP: NEGATIVE
COLOR UR AUTO: YELLOW
GLUCOSE UR STRIP-MCNC: NEGATIVE MG/DL
HGB UR QL STRIP: NEGATIVE
KETONES UR STRIP-MCNC: NEGATIVE MG/DL
LEUKOCYTE ESTERASE UR QL STRIP: ABNORMAL
MUCOUS THREADS #/AREA URNS LPF: PRESENT /LPF
NITRATE UR QL: NEGATIVE
NON-SQ EPI CELLS #/AREA URNS LPF: ABNORMAL /LPF
PH UR STRIP: 6 PH (ref 5–7)
RBC #/AREA URNS AUTO: ABNORMAL /HPF
SOURCE: ABNORMAL
SP GR UR STRIP: >1.03 (ref 1–1.03)
UROBILINOGEN UR STRIP-ACNC: 0.2 EU/DL (ref 0.2–1)
WBC #/AREA URNS AUTO: ABNORMAL /HPF

## 2019-01-09 PROCEDURE — 81001 URINALYSIS AUTO W/SCOPE: CPT | Performed by: NURSE PRACTITIONER

## 2019-01-09 PROCEDURE — 87086 URINE CULTURE/COLONY COUNT: CPT | Performed by: NURSE PRACTITIONER

## 2019-01-09 PROCEDURE — 99214 OFFICE O/P EST MOD 30 MIN: CPT | Performed by: NURSE PRACTITIONER

## 2019-01-09 RX ORDER — QUETIAPINE FUMARATE 100 MG/1
TABLET, FILM COATED ORAL
Qty: 180 TABLET | Refills: 0 | Status: SHIPPED | OUTPATIENT
Start: 2019-01-09 | End: 2019-04-27

## 2019-01-09 RX ORDER — BUPROPION HYDROCHLORIDE 300 MG/1
300 TABLET ORAL DAILY
Qty: 90 TABLET | Refills: 0 | Status: SHIPPED | OUTPATIENT
Start: 2019-01-09 | End: 2019-04-01

## 2019-01-09 RX ORDER — SULFAMETHOXAZOLE/TRIMETHOPRIM 800-160 MG
1 TABLET ORAL 2 TIMES DAILY
Qty: 6 TABLET | Refills: 0 | Status: SHIPPED | OUTPATIENT
Start: 2019-01-09 | End: 2019-03-14

## 2019-01-09 RX ORDER — PREGABALIN 150 MG/1
150 CAPSULE ORAL 2 TIMES DAILY
Qty: 60 CAPSULE | Refills: 0 | Status: SHIPPED | OUTPATIENT
Start: 2019-01-24 | End: 2019-02-27

## 2019-01-09 NOTE — PATIENT INSTRUCTIONS
Start the antibiotic now.  I will let you know about the results of the culture.    I refilled your medications for 3 months.

## 2019-01-09 NOTE — PROGRESS NOTES
SUBJECTIVE:   Jeannette Quesada is a 33 year old female who presents to clinic today for the following health issues:      Depression Followup    Status since last visit: mild Improved     See PHQ-9 for current symptoms.  Other associated symptoms: None    Complicating factors:   Significant life event:  No   Current substance abuse:  None  Anxiety or Panic symptoms:  No    PHQ 2018 2018 10/10/2018   PHQ-9 Total Score 24 17 11   Q9: Suicide Ideation Nearly every day Not at all Several days   F/U: Thoughts of suicide or self-harm No - -   F/U: Safety concerns No - -       PHQ-9  English  PHQ-9   Any Language  Suicide Assessment Five-step Evaluation and Treatment (SAFE-T)    Amount of exercise or physical activity: None    Problems taking medications regularly: No    Medication side effects: none    Diet: regular (no restrictions)    Mood symptoms are stable.  Taking lyrica and seroquel as directed, also taking wellbutrin and zoloft.  No change desired.      Patient would like to discuss urinary symptoms. Patient states she urine has been dark, cloudy, and had some odor for 2 weeks. No vaginal symptoms, patient has tried an increase of fluids.     No blood in the urine.  Feeling chilled, fatigued.  Taking in food and fluids.  Last treated August, difficult resolution, repeat treatment in September, nothing since then.  Feels her symptoms are less severe than they were months ago.  No abdominal pain.    Problem list and histories reviewed & adjusted, as indicated.  Additional history: as documented    Patient Active Problem List   Diagnosis     Migraine without aura and without status migrainosus, not intractable     Moderate episode of recurrent major depressive disorder (H)     CARDIOVASCULAR SCREENING; LDL GOAL LESS THAN 160     Fibromyalgia     Generalized anxiety disorder     Past Surgical History:   Procedure Laterality Date      SECTION       CHOLECYSTECTOMY, LAPOROSCOPIC       TUBAL  LIGATION         Social History     Tobacco Use     Smoking status: Former Smoker     Packs/day: 1.00     Types: Cigarettes     Last attempt to quit: 2018     Years since quittin.5     Smokeless tobacco: Never Used     Tobacco comment: no smoking for 3 days   Substance Use Topics     Alcohol use: Yes     Comment: Rare     Family History   Problem Relation Age of Onset     Family History Negative Mother      Family History Negative Father            Reviewed and updated as needed this visit by clinical staff       Reviewed and updated as needed this visit by Provider         ROS:  SEE HPI.    OBJECTIVE:     BP (P) 108/64 (BP Location: Right arm, Patient Position: Chair, Cuff Size: Adult Regular)   Pulse (P) 94   Temp (P) 95.5  F (35.3  C) (Tympanic)   Resp (P) 16   Wt (P) 67.7 kg (149 lb 4.8 oz)   SpO2 (P) 98%   BMI (P) 27.31 kg/m    Body mass index is 27.31 kg/m  (pended).  GENERAL: healthy, alert and no distress  RESP: lungs clear to auscultation - no rales, rhonchi or wheezes  CV: regular rates and rhythm and normal S1 S2, no S3 or S4  ABDOMEN: soft, nontender, no hepatosplenomegaly, no masses and bowel sounds normal  PSYCH: mentation appears normal, affect normal/bright    Diagnostic Test Results:  Results for orders placed or performed in visit on 19 (from the past 24 hour(s))   *UA reflex to Microscopic and Culture ([place] and [facility] (except [place] and [place])   Result Value Ref Range    Color Urine Yellow     Appearance Urine Slightly Cloudy     Glucose Urine Negative NEG^Negative mg/dL    Bilirubin Urine Negative NEG^Negative    Ketones Urine Negative NEG^Negative mg/dL    Specific Gravity Urine >1.030 1.003 - 1.035    Blood Urine Negative NEG^Negative    pH Urine 6.0 5.0 - 7.0 pH    Protein Albumin Urine Negative NEG^Negative mg/dL    Urobilinogen Urine 0.2 0.2 - 1.0 EU/dL    Nitrite Urine Negative NEG^Negative    Leukocyte Esterase Urine Small (A) NEG^Negative    Source  Midstream Urine    Urine Microscopic   Result Value Ref Range    WBC Urine 10-25 (A) OTO5^0 - 5 /HPF    RBC Urine O - 2 OTO2^O - 2 /HPF    Squamous Epithelial /LPF Urine Few FEW^Few /LPF    Bacteria Urine Few (A) NEG^Negative /HPF    Mucous Urine Present (A) NEG^Negative /LPF       ASSESSMENT/PLAN:   1. Urinary frequency  Discussed lab results.  Mild symptoms.  Start short course of abx, will culture.  Pt agrees with plan and verbalized understanding.  - *UA reflex to Microscopic and Culture ([place] and [facility] (except [place] and [place])  - Urine Microscopic  - Urine Culture Aerobic Bacterial  - sulfamethoxazole-trimethoprim (BACTRIM DS/SEPTRA DS) 800-160 MG tablet; Take 1 tablet by mouth 2 times daily for 3 days  Dispense: 6 tablet; Refill: 0    2. Moderate episode of recurrent major depressive disorder (H)  Stable, refilled.  - QUEtiapine (SEROQUEL) 100 MG tablet; TAKE 2 TABLETS BY MOUTH AT BEDTIME  Dispense: 180 tablet; Refill: 0  - buPROPion (WELLBUTRIN XL) 300 MG 24 hr tablet; Take 1 tablet (300 mg) by mouth daily  Dispense: 90 tablet; Refill: 0    3. Fibromyalgia  Stable, refilled.  - pregabalin (LYRICA) 150 MG capsule; Take 1 capsule (150 mg) by mouth 2 times daily  Dispense: 60 capsule; Refill: 0    TONA Ford Ra, CNP  [facility] [place] [facility]

## 2019-01-10 LAB
BACTERIA SPEC CULT: NORMAL
SPECIMEN SOURCE: NORMAL

## 2019-01-25 DIAGNOSIS — G43.009 MIGRAINE WITHOUT AURA AND WITHOUT STATUS MIGRAINOSUS, NOT INTRACTABLE: ICD-10-CM

## 2019-01-29 RX ORDER — SUMATRIPTAN 100 MG/1
TABLET, FILM COATED ORAL
Qty: 9 TABLET | Refills: 0
Start: 2019-01-29

## 2019-02-08 ENCOUNTER — TELEPHONE (OUTPATIENT)
Dept: FAMILY MEDICINE | Facility: CLINIC | Age: 34
End: 2019-02-08

## 2019-02-08 NOTE — TELEPHONE ENCOUNTER
Panel Management Review      Patient has the following on her problem list:     Depression / Dysthymia review    Measure:  Needs PHQ-9 score of 4 or less during index window.  Administer PHQ-9 and if score is 5 or more, send encounter to provider for next steps.    5 - 7 month window range:      PHQ-9 SCORE 2/16/2018 9/19/2018 10/10/2018   PHQ-9 Total Score 24 17 11       If PHQ-9 recheck is 5 or more, route to provider for next steps.    Patient is due for:  PHQ9      Composite cancer screening  Chart review shows that this patient is due/due soon for the following None  Summary:    Patient is due/failing the following:   PHQ9    Action needed:   Patient needs to do PHQ9.    Type of outreach:    Sent [employer] message.    Questions for provider review:    None                                                                                                                                    Philly Galindo CMA       Chart routed to Care Team .

## 2019-02-27 DIAGNOSIS — M79.7 FIBROMYALGIA: ICD-10-CM

## 2019-02-27 RX ORDER — PREGABALIN 150 MG/1
150 CAPSULE ORAL 2 TIMES DAILY
Qty: 60 CAPSULE | Refills: 0 | Status: SHIPPED | OUTPATIENT
Start: 2019-02-27 | End: 2019-04-01

## 2019-02-27 NOTE — TELEPHONE ENCOUNTER
Lyrica      Last Written Prescription Date:  1/24/2019  Last Fill Quantity: 60,   # refills: 0  Last Office Visit: 1/9/2019  Future Office visit:     [place]  checked. Last 4 monthly refills per KYRIE. No concerns.      Routing refill request to provider for review/approval because:  Drug not on the [facility], P or [facility] refill protocol or controlled substance    Please print,sign and fax to pharmacy.    Amina Duarte RN

## 2019-03-14 ENCOUNTER — OFFICE VISIT (OUTPATIENT)
Dept: FAMILY MEDICINE | Facility: CLINIC | Age: 34
End: 2019-03-14
Payer: COMMERCIAL

## 2019-03-14 VITALS
WEIGHT: 156 LBS | DIASTOLIC BLOOD PRESSURE: 64 MMHG | RESPIRATION RATE: 18 BRPM | TEMPERATURE: 98.2 F | HEART RATE: 91 BPM | BODY MASS INDEX: 28.53 KG/M2 | SYSTOLIC BLOOD PRESSURE: 108 MMHG | OXYGEN SATURATION: 98 %

## 2019-03-14 DIAGNOSIS — G43.009 MIGRAINE WITHOUT AURA AND WITHOUT STATUS MIGRAINOSUS, NOT INTRACTABLE: ICD-10-CM

## 2019-03-14 DIAGNOSIS — N30.01 ACUTE CYSTITIS WITH HEMATURIA: Primary | ICD-10-CM

## 2019-03-14 DIAGNOSIS — R82.90 NONSPECIFIC FINDING ON EXAMINATION OF URINE: ICD-10-CM

## 2019-03-14 LAB
ALBUMIN UR-MCNC: 30 MG/DL
APPEARANCE UR: ABNORMAL
BACTERIA #/AREA URNS HPF: ABNORMAL /HPF
BILIRUB UR QL STRIP: NEGATIVE
COLOR UR AUTO: YELLOW
GLUCOSE UR STRIP-MCNC: NEGATIVE MG/DL
HGB UR QL STRIP: ABNORMAL
KETONES UR STRIP-MCNC: NEGATIVE MG/DL
LEUKOCYTE ESTERASE UR QL STRIP: ABNORMAL
NITRATE UR QL: POSITIVE
NON-SQ EPI CELLS #/AREA URNS LPF: ABNORMAL /LPF
PH UR STRIP: 6 PH (ref 5–7)
RBC #/AREA URNS AUTO: ABNORMAL /HPF
SOURCE: ABNORMAL
SP GR UR STRIP: >1.03 (ref 1–1.03)
UROBILINOGEN UR STRIP-ACNC: 0.2 EU/DL (ref 0.2–1)
WBC #/AREA URNS AUTO: >100 /HPF

## 2019-03-14 PROCEDURE — 87186 SC STD MICRODIL/AGAR DIL: CPT | Performed by: NURSE PRACTITIONER

## 2019-03-14 PROCEDURE — 87088 URINE BACTERIA CULTURE: CPT | Performed by: NURSE PRACTITIONER

## 2019-03-14 PROCEDURE — 81001 URINALYSIS AUTO W/SCOPE: CPT | Performed by: NURSE PRACTITIONER

## 2019-03-14 PROCEDURE — 87086 URINE CULTURE/COLONY COUNT: CPT | Performed by: NURSE PRACTITIONER

## 2019-03-14 PROCEDURE — 99213 OFFICE O/P EST LOW 20 MIN: CPT | Performed by: NURSE PRACTITIONER

## 2019-03-14 RX ORDER — SUMATRIPTAN 100 MG/1
TABLET, FILM COATED ORAL
Qty: 9 TABLET | Refills: 6 | Status: SHIPPED | OUTPATIENT
Start: 2019-03-14 | End: 2020-03-20

## 2019-03-14 RX ORDER — SULFAMETHOXAZOLE/TRIMETHOPRIM 800-160 MG
1 TABLET ORAL 2 TIMES DAILY
Qty: 14 TABLET | Refills: 0 | Status: SHIPPED | OUTPATIENT
Start: 2019-03-14 | End: 2019-03-21

## 2019-03-14 ASSESSMENT — ANXIETY QUESTIONNAIRES
6. BECOMING EASILY ANNOYED OR IRRITABLE: SEVERAL DAYS
5. BEING SO RESTLESS THAT IT IS HARD TO SIT STILL: SEVERAL DAYS
3. WORRYING TOO MUCH ABOUT DIFFERENT THINGS: SEVERAL DAYS
1. FEELING NERVOUS, ANXIOUS, OR ON EDGE: SEVERAL DAYS
7. FEELING AFRAID AS IF SOMETHING AWFUL MIGHT HAPPEN: SEVERAL DAYS
IF YOU CHECKED OFF ANY PROBLEMS ON THIS QUESTIONNAIRE, HOW DIFFICULT HAVE THESE PROBLEMS MADE IT FOR YOU TO DO YOUR WORK, TAKE CARE OF THINGS AT HOME, OR GET ALONG WITH OTHER PEOPLE: SOMEWHAT DIFFICULT
2. NOT BEING ABLE TO STOP OR CONTROL WORRYING: SEVERAL DAYS
GAD7 TOTAL SCORE: 7

## 2019-03-14 ASSESSMENT — PATIENT HEALTH QUESTIONNAIRE - PHQ9
5. POOR APPETITE OR OVEREATING: SEVERAL DAYS
SUM OF ALL RESPONSES TO PHQ QUESTIONS 1-9: 9

## 2019-03-14 NOTE — PATIENT INSTRUCTIONS
"  Patient Education     Bladder Infection, Female (Adult)    Urine is normally doesn't have any bacteria in it. But bacteria can get into the urinary tract from the skin around the rectum. Or they can travel in the blood from elsewhere in the body. Once they are in your urinary tract, they can cause infection in the urethra (urethritis), the bladder (cystitis), or the kidneys (pyelonephritis).  The most common place for an infection is in the bladder. This is called a bladder infection. This is one of the most common infections in women. Most bladder infections are easily treated. They are not serious unless the infection spreads to the kidney.  The phrases \"bladder infection,\" \"UTI,\" and \"cystitis\" are often used to describe the same thing. But they are not always the same. Cystitis is an inflammation of the bladder. The most common cause of cystitis is an infection.  Symptoms  The infection causes inflammation in the urethra and bladder. This causes many of the symptoms. The most common symptoms of a bladder infection are:    Pain or burning when urinating    Having to urinate more often than usual    Urgent need to urinate    Only a small amount of urine comes out    Blood in urine    Abdominal discomfort. This is usually in the lower abdomen above the pubic bone.    Cloudy urine    Strong- or bad-smelling urine    Unable to urinate (urinary retention)    Unable to hold urine in (urinary incontinence)    Fever    Loss of appetite    Confusion (in older adults)  Causes  Bladder infections are not contagious. You can't get one from someone else, from a toilet seat, or from sharing a bath.  The most common cause of bladder infections is bacteria from the bowels. The bacteria get onto the skin around the opening of the urethra. From there, they can get into the urine and travel up to the bladder, causing inflammation and infection. This usually happens because of:    Wiping improperly after urinating. Always wipe " from front to back.    Bowel incontinence    Pregnancy    Procedures such as having a catheter inserted    Older age    Not emptying your bladder. This can allow bacteria a chance to grow in your urine.    Dehydration    Constipation    Sex    Use of a diaphragm for birth control   Treatment  Bladder infections are diagnosed by a urine test. They are treated with antibiotics and usually clear up quickly without complications. Treatment helps prevent a more serious kidney infection.  Medicines  Medicines can help in the treatment of a bladder infection:    Take antibiotics until they are used up, even if you feel better. It is important to finish them to make sure the infection has cleared.    You can use acetaminophen or ibuprofen for pain, fever, or discomfort, unless another medicine was prescribed. If you have chronic liver or kidney disease, talk with your healthcare provider before using these medicines. Also talk with your provider if you've ever had a stomach ulcer or gastrointestinal bleeding, or are taking blood-thinner medicines.    If you are given phenazopydridine to reduce burning with urination, it will cause your urine to become a bright orange color. This can stain clothing.  Care and prevention  These self-care steps can help prevent future infections:    Drink plenty of fluids to prevent dehydration and flush out your bladder. Do this unless you must restrict fluids for other health reasons, or your doctor told you not to.    Proper cleaning after going to the bathroom is important. Wipe from front to back after using the toilet to prevent the spread of bacteria.    Urinate more often. Don't try to hold urine in for a long time.    Wear loose-fitting clothes and cotton underwear. Avoid tight-fitting pants.    Improve your diet and prevent constipation. Eat more fresh fruit and vegetables, and fiber, and less junk and fatty foods.    Avoid sex until your symptoms are gone.    Avoid caffeine,  alcohol, and spicy foods. These can irritate your bladder.    Urinate right after intercourse to flush out your bladder.    If you use birth control pills and have frequent bladder infections, discuss it with your doctor.  Follow-up care  Call your healthcare provider if all symptoms are not gone after 3 days of treatment. This is especially important if you have repeat infections.  If a culture was done, you will be told if your treatment needs to be changed. If directed, you can call to find out the results.  If X-rays were done, you will be told if the results will affect your treatment.  Call 911  Call 911 if any of the following occur:    Trouble breathing    Hard to wake up or confusion    Fainting or loss of consciousness    Rapid heart rate  When to seek medical advice  Call your healthcare provider right away if any of these occur:    Fever of 100.4 F (38.0 C) or higher, or as directed by your healthcare provider    Symptoms are not better by the third day of treatment    Back or belly (abdominal) pain that gets worse    Repeated vomiting, or unable to keep medicine down    Weakness or dizziness    Vaginal discharge    Pain, redness, or swelling in the outer vaginal area (labia)  Date Last Reviewed: 10/1/2016    2035-8081 [employer]. [address] [place] [address], [place] [address]. All rights reserved. This information is not intended as a substitute for professional medical care. Always follow your healthcare professional's instructions.

## 2019-03-14 NOTE — PROGRESS NOTES
SUBJECTIVE:   Jeannette Quesada is a 33 year old female who presents to clinic today for the following health issues:      URINARY TRACT SYMPTOMS      Duration: 2 weeks    Description  urgency and hesitancy, cloudy urine     Intensity:  moderate    Accompanying signs and symptoms:  Fever/chills: YES  Flank pain YES- mild  Nausea and vomiting: YES, Nausea   Vaginal symptoms: none  Abdominal/Pelvic Pain: no     History  History of frequent UTI's: YES  History of kidney stones: no   Sexually Active: YES  Possibility of pregnancy: No    Precipitating or alleviating factors: None    Therapies tried and outcome: increase fluid intake   Outcome: not effetive    Recent onset symptoms.  Last UTI was about 6 months ago.  She is eating and drinking without problem.  No fevers.    Problem list and histories reviewed & adjusted, as indicated.  Additional history: as documented    Patient Active Problem List   Diagnosis     Migraine without aura and without status migrainosus, not intractable     Moderate episode of recurrent major depressive disorder (H)     CARDIOVASCULAR SCREENING; LDL GOAL LESS THAN 160     Fibromyalgia     Generalized anxiety disorder     Past Surgical History:   Procedure Laterality Date      SECTION       CHOLECYSTECTOMY, LAPOROSCOPIC       TUBAL LIGATION         Social History     Tobacco Use     Smoking status: Former Smoker     Packs/day: 1.00     Types: Cigarettes     Last attempt to quit: 2018     Years since quittin.6     Smokeless tobacco: Never Used     Tobacco comment: no smoking for 3 days   Substance Use Topics     Alcohol use: Yes     Comment: Rare     Family History   Problem Relation Age of Onset     Family History Negative Mother      Family History Negative Father            Reviewed and updated as needed this visit by clinical staff       Reviewed and updated as needed this visit by Provider         ROS:  SEE HPI.    OBJECTIVE:     /64 (BP Location: Right arm,  Patient Position: Chair, Cuff Size: Adult Regular)   Pulse 91   Temp 98.2  F (36.8  C) (Tympanic)   Resp 18   Wt 70.8 kg (156 lb)   LMP 02/20/2019 (Approximate)   SpO2 98%   BMI 28.53 kg/m    Body mass index is 28.53 kg/m .  GENERAL: healthy, alert and no distress  RESP: lungs clear to auscultation - no rales, rhonchi or wheezes  CV: regular rates and rhythm and normal S1 S2, no S3 or S4  ABDOMEN: soft, nontender, without hepatosplenomegaly or masses  PSYCH: mentation appears normal, affect normal/bright    Diagnostic Test Results:  Results for orders placed or performed in visit on 03/14/19 (from the past 24 hour(s))   *UA reflex to Microscopic and Culture ([facility] (except [place] and [place])   Result Value Ref Range    Color Urine Yellow     Appearance Urine Cloudy     Glucose Urine Negative NEG^Negative mg/dL    Bilirubin Urine Negative NEG^Negative    Ketones Urine Negative NEG^Negative mg/dL    Specific Gravity Urine >1.030 1.003 - 1.035    Blood Urine Moderate (A) NEG^Negative    pH Urine 6.0 5.0 - 7.0 pH    Protein Albumin Urine 30 (A) NEG^Negative mg/dL    Urobilinogen Urine 0.2 0.2 - 1.0 EU/dL    Nitrite Urine Positive (A) NEG^Negative    Leukocyte Esterase Urine Moderate (A) NEG^Negative    Source Midstream Urine    Urine Culture Aerobic Bacterial   Result Value Ref Range    Specimen Description Midstream Urine     Culture Micro       Referred to [facility], await final report.   Urine Microscopic   Result Value Ref Range    WBC Urine >100 (A) OTO5^0 - 5 /HPF    RBC Urine 5-10 (A) OTO2^O - 2 /HPF    Squamous Epithelial /LPF Urine Few FEW^Few /LPF    Bacteria Urine Many (A) NEG^Negative /HPF       ASSESSMENT/PLAN:   1. Acute cystitis with hematuria  Discussed lab results.  Tolerated bactrim well in the past.  Start 7 day course now.  - sulfamethoxazole-trimethoprim (BACTRIM DS/SEPTRA DS) 800-160 MG tablet; Take 1 tablet by mouth 2 times  daily for 7 days  Dispense: 14 tablet; Refill: 0  - **UA reflex to Microscopic FUTURE anytime; Future    2. Nonspecific finding on examination of urine  Recheck urine in 4 weeks.    - Urine Culture Aerobic Bacterial    3. Migraine without aura and without status migrainosus, not intractable  Stable, imitrex is effective.  Refilled.  - SUMAtriptan (IMITREX) 100 MG tablet; TAKE 1 TABLET(100 MG) BY MOUTH AT ONSET OF HEADACHE FOR MIGRAINE. MAY REPEAT IN 2 HOURS. MAX 2 TABLETS/ 24 HOURS  Dispense: 9 tablet; Refill: 6    TONA Ford Ra [facility]

## 2019-03-15 ASSESSMENT — ANXIETY QUESTIONNAIRES: GAD7 TOTAL SCORE: 7

## 2019-03-19 LAB
BACTERIA SPEC CULT: ABNORMAL
SPECIMEN SOURCE: ABNORMAL

## 2019-03-21 ENCOUNTER — OFFICE VISIT (OUTPATIENT)
Dept: FAMILY MEDICINE | Facility: CLINIC | Age: 34
End: 2019-03-21
Payer: COMMERCIAL

## 2019-03-21 VITALS
SYSTOLIC BLOOD PRESSURE: 112 MMHG | DIASTOLIC BLOOD PRESSURE: 60 MMHG | BODY MASS INDEX: 28.08 KG/M2 | OXYGEN SATURATION: 98 % | RESPIRATION RATE: 14 BRPM | TEMPERATURE: 98.6 F | HEART RATE: 106 BPM | WEIGHT: 153.5 LBS

## 2019-03-21 VITALS
WEIGHT: 149.3 LBS | RESPIRATION RATE: 16 BRPM | SYSTOLIC BLOOD PRESSURE: 108 MMHG | OXYGEN SATURATION: 98 % | BODY MASS INDEX: 27.31 KG/M2 | TEMPERATURE: 95.5 F | HEART RATE: 94 BPM | DIASTOLIC BLOOD PRESSURE: 64 MMHG

## 2019-03-21 DIAGNOSIS — R10.2 PELVIC PAIN IN FEMALE: Primary | ICD-10-CM

## 2019-03-21 PROCEDURE — 99214 OFFICE O/P EST MOD 30 MIN: CPT | Performed by: NURSE PRACTITIONER

## 2019-03-21 NOTE — PATIENT INSTRUCTIONS
Monitor symptoms.  Schedule the pelvic ultrasound.  If any change or worsening seek care over the weekend.

## 2019-03-21 NOTE — PROGRESS NOTES
SUBJECTIVE:   Jeannette Quesada is a 33 year old female who presents to clinic today for the following health issues:      Abdominal Pain      Duration: 1-2 weeks     Description (location/character/radiation): lower abdominal pain (intermittent)       Associated flank pain: None    Intensity:  moderate    Accompanying signs and symptoms:        Fever/Chills: YES- cold sweats        Gas/Bloating: YES       Nausea/vomitting: YES       Diarrhea: no       Dysuria or Hematuria: no     History (previous similar pain/trauma/previous testing): None    Precipitating or alleviating factors:       Pain worse with eating/BM/urination: None       Pain relieved by BM: no     Therapies tried and outcome: Hot pack, ibuprofen     LMP:  3/20/2019    Pt presents with intermittent pelvic pain over the past 1-2 weeks.  Sometimes without pain, sometimes with pain L side.  She reports her urinary symptoms have resolved.  No fevers.  She is eating and drinking as usual with normal bowel and urinary habits.  She just started menstruating and is unsure if this is just due to her period.  She has had a tubal ligation.  No change in partner.  No vaginal symptoms.    Problem list and histories reviewed & adjusted, as indicated.  Additional history: as documented    Patient Active Problem List   Diagnosis     Migraine without aura and without status migrainosus, not intractable     Moderate episode of recurrent major depressive disorder (H)     CARDIOVASCULAR SCREENING; LDL GOAL LESS THAN 160     Fibromyalgia     Generalized anxiety disorder     Past Surgical History:   Procedure Laterality Date      SECTION       CHOLECYSTECTOMY, LAPOROSCOPIC       TUBAL LIGATION         Social History     Tobacco Use     Smoking status: Former Smoker     Packs/day: 1.00     Types: Cigarettes     Last attempt to quit: 2018     Years since quittin.7     Smokeless tobacco: Never Used     Tobacco comment: no smoking for 3 days   Substance Use  Topics     Alcohol use: Yes     Comment: Rare     Family History   Problem Relation Age of Onset     Family History Negative Mother      Family History Negative Father            Reviewed and updated as needed this visit by clinical staff       Reviewed and updated as needed this visit by Provider         ROS:  SEE HPI.    OBJECTIVE:     /60 (BP Location: Right arm, Patient Position: Chair, Cuff Size: Adult Regular)   Pulse 106   Temp 98.6  F (37  C) (Tympanic)   Resp 14   Wt 69.6 kg (153 lb 8 oz)   LMP 03/20/2019 (Exact Date)   SpO2 98%   BMI 28.08 kg/m    Body mass index is 28.08 kg/m .  GENERAL: healthy, alert and no distress  RESP: lungs clear to auscultation - no rales, rhonchi or wheezes  CV: regular rate and rhythm, normal S1 S2, no S3 or S4, no murmur, click or rub, no peripheral edema and peripheral pulses strong  ABDOMEN: soft, nontender, no hepatosplenomegaly, no masses and bowel sounds normal   (female): Declined.  PSYCH: mentation appears normal, affect normal/bright    Diagnostic Test Results:  No results found for this or any previous visit (from the past 24 hour(s)).    ASSESSMENT/PLAN:   1. Pelvic pain in female  33 y.o. Female, recently treated for UTI, now with pelvic pain, intermittent and stable over the past few weeks.  No additional symptoms.  Declines pelvic exam today.  Discussed options.  Pelvic US ordered.  Pt agrees with plan and verbalized understanding.  - US Pelvic Complete w Transvaginal; Future    TONA Ford Ra, CNP  [facility]

## 2019-04-01 DIAGNOSIS — F33.1 MODERATE EPISODE OF RECURRENT MAJOR DEPRESSIVE DISORDER (H): ICD-10-CM

## 2019-04-01 DIAGNOSIS — M79.7 FIBROMYALGIA: ICD-10-CM

## 2019-04-02 NOTE — TELEPHONE ENCOUNTER
"Requested Prescriptions   Pending Prescriptions Disp Refills     LYRICA 150 MG capsule [Pharmacy Med Name: LYRICA 150MG CAPSULES]  Last Written Prescription Date:  2/27/19  Last Fill Quantity: 60,  # refills: 0   Last office visit: 3/21/2019 with prescribing provider:  Alda Mosquera Ra, APRN CNP   Future Office Visit:     60 capsule 0     Sig: TAKE 1 CAPSULE BY MOUTH TWICE DAILY    There is no refill protocol information for this order        buPROPion (WELLBUTRIN XL) 300 MG 24 hr tablet [Pharmacy Med Name: BUPROPION XL 300MG TABLETS]  Last Written Prescription Date:  1/9/19  Last Fill Quantity: 90,  # refills: 0   Last office visit: 3/21/2019 with prescribing provider:  Alda Mosquera Ra, APRN CNP   Future Office Visit:     90 tablet 0     Sig: TAKE 1 TABLET(300 MG) BY MOUTH DAILY    SSRIs Protocol Failed - 4/1/2019  3:53 PM       Failed - PHQ-9 score less than 5 in past 6 months    Please review last PHQ-9 score.   PHQ-9 SCORE 9/19/2018 10/10/2018 3/14/2019   PHQ-9 Total Score 17 11 9     JOEY-7 SCORE 9/19/2018 10/10/2018 3/14/2019   Total Score 19 11 7                Passed - Medication is Bupropion    If the medication is Bupropion (Wellbutrin), and the patient is taking for smoking cessation; OK to refill.         Passed - Medication is active on med list       Passed - Patient is age 18 or older       Passed - No active pregnancy on record       Passed - No positive pregnancy test in last 12 months       Passed - Recent (6 mo) or future (30 days) visit within the authorizing provider's specialty    Patient had office visit in the last 6 months or has a visit in the next 30 days with authorizing provider or within the authorizing provider's specialty.  See \"Patient Info\" tab in inbasket, or \"Choose Columns\" in Meds & Orders section of the refill encounter.              "

## 2019-04-04 RX ORDER — BUPROPION HYDROCHLORIDE 300 MG/1
TABLET ORAL
Qty: 90 TABLET | Refills: 0 | Status: SHIPPED | OUTPATIENT
Start: 2019-04-04 | End: 2019-06-30

## 2019-04-04 RX ORDER — PREGABALIN 150 MG/1
CAPSULE ORAL
Qty: 60 CAPSULE | Refills: 0 | Status: SHIPPED | OUTPATIENT
Start: 2019-04-04 | End: 2019-05-01

## 2019-04-04 NOTE — TELEPHONE ENCOUNTER
Routing refill request to provider for review/approval because:  Lyrica: Drug not on the [facility] refill protocol   Bupropion: PHQ-9 > [facility] protocol for RN trice RIVAS RN - Triage  [facility]

## 2019-04-10 ENCOUNTER — ANCILLARY PROCEDURE (OUTPATIENT)
Dept: ULTRASOUND IMAGING | Facility: CLINIC | Age: 34
End: 2019-04-10
Attending: NURSE PRACTITIONER
Payer: COMMERCIAL

## 2019-04-10 DIAGNOSIS — R10.2 PELVIC PAIN IN FEMALE: ICD-10-CM

## 2019-04-10 PROCEDURE — 76856 US EXAM PELVIC COMPLETE: CPT | Performed by: FAMILY MEDICINE

## 2019-04-10 PROCEDURE — 76830 TRANSVAGINAL US NON-OB: CPT | Performed by: FAMILY MEDICINE

## 2019-04-18 DIAGNOSIS — N83.201 CYST OF RIGHT OVARY: Primary | ICD-10-CM

## 2019-04-27 DIAGNOSIS — F33.1 MODERATE EPISODE OF RECURRENT MAJOR DEPRESSIVE DISORDER (H): ICD-10-CM

## 2019-04-27 NOTE — TELEPHONE ENCOUNTER
Requested Prescriptions   Pending Prescriptions Disp Refills     QUEtiapine (SEROQUEL) 100 MG tablet [Pharmacy Med Name: QUETIAPINE 100MG TABLETS] 180 tablet 0     Sig: TAKE 2 TABLETS BY MOUTH AT BEDTIME  Last Written Prescription Date:  01/09/2019  Last Fill Quantity: 180 tablet,  # refills: 0    Last office visit: 3/21/2019 with prescribing provider:  Alda Mosquera Ra, APRN CNP       Future Office Visit:           Antipsychotic Medications Failed - 4/27/2019  2:43 PM        Failed - Lipid panel on file within the past 12 months     No lab results found.            Passed - Blood pressure under 140/90 in past 12 months     BP Readings from Last 3 Encounters:   03/21/19 112/60   03/14/19 108/64   01/09/19 108/64                 Passed - Patient is 12 years of age or older        Passed - CBC on file in past 12 months     Recent Labs   Lab Test 10/24/18  1104   WBC 5.2   RBC 4.56   HGB 11.5*   HCT 38.0                    Passed - Heart Rate on file within past 12 months     Pulse Readings from Last 3 Encounters:   03/21/19 106   03/14/19 91   01/09/19 94               Passed - A1c or Glucose on file in past 12 months     Recent Labs   Lab Test 09/19/18  1144   GLC 84       Please review patients last 3 weights. If a weight gain of >10 lbs exists, you may refill the prescription once after instructing the patient to schedule an appointment within the next 30 days.    Wt Readings from Last 3 Encounters:   03/21/19 69.6 kg (153 lb 8 oz)   03/14/19 70.8 kg (156 lb)   01/09/19 67.7 kg (149 lb 4.8 oz)             Passed - Medication is active on med list        Passed - Patient is not pregnant        Passed - No positve pregnancy test on file in past 12 months        Passed - Recent (6 mo) or future (30 days) visit within the authorizing provider's specialty     Patient had office visit in the last 6 months or has a visit in the next 30 days with authorizing provider or within the authorizing provider's  "specialty.  See \"Patient Info\" tab in inbasket, or \"Choose Columns\" in Meds & Orders section of the refill encounter.              "

## 2019-05-01 DIAGNOSIS — M79.7 FIBROMYALGIA: ICD-10-CM

## 2019-05-01 RX ORDER — QUETIAPINE FUMARATE 100 MG/1
TABLET, FILM COATED ORAL
Qty: 180 TABLET | Refills: 1 | Status: SHIPPED | OUTPATIENT
Start: 2019-05-01 | End: 2019-11-06

## 2019-05-01 NOTE — TELEPHONE ENCOUNTER
Prescription approved per [facility] Refill Protocol.    Ibis Rader RN

## 2019-05-02 RX ORDER — PREGABALIN 150 MG/1
CAPSULE ORAL
Qty: 60 CAPSULE | Refills: 0 | Status: SHIPPED | OUTPATIENT
Start: 2019-05-04 | End: 2019-06-03

## 2019-05-02 NOTE — TELEPHONE ENCOUNTER
Routing refill request to provider for review/approval because:  Drug not on the [facility], [facility] or [facility] refill protocol or controlled substance    Tiara RIVAS RN - Triage  [facility]

## 2019-05-02 NOTE — TELEPHONE ENCOUNTER
Requested Prescriptions   Pending Prescriptions Disp Refills     LYRICA 150 MG capsule [Pharmacy Med Name: LYRICA 150MG CAPSULES] 60 capsule 0     Sig: TAKE ONE CAPSULE BY MOUTH TWICE DAILY       There is no refill protocol information for this order        Last Written Prescription Date:  4/4/19  Last Fill Quantity: 60,  # refills: 0    Last office visit: 3/21/2019 with prescribing provider:  Alda Moqsuera Ra, APRN CNP       Future Office Visit:

## 2019-05-23 ENCOUNTER — ANCILLARY PROCEDURE (OUTPATIENT)
Dept: ULTRASOUND IMAGING | Facility: CLINIC | Age: 34
End: 2019-05-23
Attending: NURSE PRACTITIONER
Payer: COMMERCIAL

## 2019-05-23 DIAGNOSIS — N83.201 CYST OF RIGHT OVARY: ICD-10-CM

## 2019-05-23 PROCEDURE — 76856 US EXAM PELVIC COMPLETE: CPT | Performed by: FAMILY MEDICINE

## 2019-05-23 PROCEDURE — 76830 TRANSVAGINAL US NON-OB: CPT | Performed by: FAMILY MEDICINE

## 2019-06-03 DIAGNOSIS — M79.7 FIBROMYALGIA: ICD-10-CM

## 2019-06-04 NOTE — TELEPHONE ENCOUNTER
Requested Prescriptions   Pending Prescriptions Disp Refills     LYRICA 150 MG capsule [Pharmacy Med Name: LYRICA 150MG CAPSULES] 60 capsule 0     Sig: TAKE ONE CAPSULE BY MOUTH TWICE DAILY STARTING 05-       There is no refill protocol information for this order        Last Written Prescription Date:  5/4/19  Last Fill Quantity: 60,  # refills: 0    Last office visit: 3/21/2019 with prescribing provider:  Alda Mosquera Ra, APRN CNP        Future Office Visit:

## 2019-06-05 RX ORDER — PREGABALIN 150 MG/1
150 CAPSULE ORAL 2 TIMES DAILY
Qty: 60 CAPSULE | Refills: 0 | Status: SHIPPED | OUTPATIENT
Start: 2019-06-05 | End: 2019-06-30

## 2019-06-05 NOTE — TELEPHONE ENCOUNTER
RX monitoring program ([place]) reviewed:     Last fill dates:  5/4/19, dispense 60                          4/4/19, dispense 60                          3/1/19, dispense 50    [place]:  https://[place]pmp-ph."[employer]/      Routing refill request to provider for review/approval because:  Drug not on the [facility] refill protocol

## 2019-08-06 DIAGNOSIS — M79.7 FIBROMYALGIA: ICD-10-CM

## 2019-08-06 RX ORDER — PREGABALIN 150 MG/1
CAPSULE ORAL
Qty: 60 CAPSULE | Refills: 0 | Status: CANCELLED | OUTPATIENT
Start: 2019-08-06

## 2019-08-06 NOTE — TELEPHONE ENCOUNTER
Requested Prescriptions   Pending Prescriptions Disp Refills     pregabalin (LYRICA) 150 MG capsule [Pharmacy Med Name: PREGABALIN 150MG CAPSULES] 60 capsule 0     Sig: TAKE 1 CAPSULE BY MOUTH TWICE DAILY  Last Written Prescription Date:  7/1/19  Last Fill Quantity: 60 cap,  # refills: 0   Last office visit: 3/21/2019 with prescribing provider:  Eveline   Future Office Visit:         There is no refill protocol information for this order

## 2019-08-07 RX ORDER — PREGABALIN 150 MG/1
CAPSULE ORAL
Qty: 60 CAPSULE | Refills: 0 | Status: SHIPPED | OUTPATIENT
Start: 2019-08-07 | End: 2019-09-03

## 2019-08-07 NOTE — TELEPHONE ENCOUNTER
Routing refill request to provider for review/approval because:  Drug not on the [facility], [facility] or [facility] refill protocol or controlled substance     checked: 7/3, 6/5, 5/4.     Kimberlyn CHAIREZ RN

## 2019-08-08 DIAGNOSIS — M79.7 FIBROMYALGIA: ICD-10-CM

## 2019-08-08 NOTE — TELEPHONE ENCOUNTER
Requested Prescriptions   Pending Prescriptions Disp Refills     pregabalin (LYRICA) 150 MG capsule [Pharmacy Med Name: PREGABALIN 150MG CAPSULES] 60 capsule 0     Sig: TAKE 1 CAPSULE BY MOUTH TWICE DAILY       There is no refill protocol information for this order        Last Written Prescription Date:  8/7/19  Last Fill Quantity: 60,  # refills: 0   Last office visit: 3/21/2019 with prescribing provider:  Alda Mosquera Ra, APRN CNP   Future Office Visit:

## 2019-08-09 DIAGNOSIS — F33.1 MODERATE EPISODE OF RECURRENT MAJOR DEPRESSIVE DISORDER (H): ICD-10-CM

## 2019-08-09 RX ORDER — PREGABALIN 150 MG/1
CAPSULE ORAL
Qty: 60 CAPSULE | Refills: 0 | OUTPATIENT
Start: 2019-08-09

## 2019-08-09 NOTE — TELEPHONE ENCOUNTER
"Requested Prescriptions   Pending Prescriptions Disp Refills     sertraline (ZOLOFT) 100 MG tablet [Pharmacy Med Name: SERTRALINE 100MG TABLETS]  Last Written Prescription Date:  5/14/19  Last Fill Quantity: 90,  # refills: 0   Last office visit: 3/21/2019 with prescribing provider:  Alda Mosquera Ra, APRN CNP    Future Office Visit:     90 tablet 0     Sig: TAKE 1 TABLET BY MOUTH DAILY       SSRIs Protocol Failed - 8/9/2019  3:19 AM        Failed - PHQ-9 score less than 5 in past 6 months     Please review last PHQ-9 score.   PHQ-9 SCORE 9/19/2018 10/10/2018 3/14/2019   PHQ-9 Total Score 17 11 9     JOEY-7 SCORE 9/19/2018 10/10/2018 3/14/2019   Total Score 19 11 7               Passed - Medication is active on med list        Passed - Patient is age 18 or older        Passed - No active pregnancy on record        Passed - No positive pregnancy test in last 12 months        Passed - Recent (6 mo) or future (30 days) visit within the authorizing provider's specialty     Patient had office visit in the last 6 months or has a visit in the next 30 days with authorizing provider or within the authorizing provider's specialty.  See \"Patient Info\" tab in inbasket, or \"Choose Columns\" in Meds & Orders section of the refill encounter.              "

## 2019-08-12 RX ORDER — SERTRALINE HYDROCHLORIDE 100 MG/1
TABLET, FILM COATED ORAL
Qty: 90 TABLET | Refills: 0 | Status: SHIPPED | OUTPATIENT
Start: 2019-08-12

## 2019-08-12 NOTE — TELEPHONE ENCOUNTER
Routing refill request to provider for review/approval because:  PHQ-9 SCORE 9/19/2018 10/10/2018 3/14/2019   PHQ-9 Total Score 17 11 9

## 2019-09-03 DIAGNOSIS — M79.7 FIBROMYALGIA: ICD-10-CM

## 2019-09-03 NOTE — TELEPHONE ENCOUNTER
Requested Prescriptions   Pending Prescriptions Disp Refills     pregabalin (LYRICA) 150 MG capsule 60 capsule 0     Sig: Take 1 capsule (150 mg) by mouth 2 times daily   Last Written Prescription Date:  8/7/19  Last Fill Quantity: 60,  # refills: 0   Last office visit: 3/21/2019 with prescribing provider:  Alda Mosquera Ra, APRN CNP   Future Office Visit:   Next 5 appointments (look out 90 days)    Sep 19, 2019  2:00 PM CDT  Office Visit with TONA Ford Ra, CNP  [facility] [address] [place] [id number] [phone]             There is no refill protocol information for this order

## 2019-09-04 RX ORDER — PREGABALIN 150 MG/1
150 CAPSULE ORAL 2 TIMES DAILY
Qty: 60 CAPSULE | Refills: 0 | Status: SHIPPED | OUTPATIENT
Start: 2019-09-09 | End: 2019-10-07

## 2019-09-26 ENCOUNTER — OFFICE VISIT (OUTPATIENT)
Dept: FAMILY MEDICINE | Facility: CLINIC | Age: 34
End: 2019-09-26
Payer: COMMERCIAL

## 2019-09-26 VITALS — SYSTOLIC BLOOD PRESSURE: 100 MMHG | DIASTOLIC BLOOD PRESSURE: 64 MMHG

## 2019-09-26 DIAGNOSIS — L70.0 ACNE VULGARIS: Primary | ICD-10-CM

## 2019-09-26 DIAGNOSIS — L81.0 POST-INFLAMMATORY HYPERPIGMENTATION: ICD-10-CM

## 2019-09-26 DIAGNOSIS — L90.5 ACNE SCARRING: ICD-10-CM

## 2019-09-26 DIAGNOSIS — F33.1 MODERATE EPISODE OF RECURRENT MAJOR DEPRESSIVE DISORDER (H): ICD-10-CM

## 2019-09-26 PROCEDURE — 82565 ASSAY OF CREATININE: CPT | Performed by: PHYSICIAN ASSISTANT

## 2019-09-26 PROCEDURE — 36415 COLL VENOUS BLD VENIPUNCTURE: CPT | Performed by: PHYSICIAN ASSISTANT

## 2019-09-26 PROCEDURE — 84132 ASSAY OF SERUM POTASSIUM: CPT | Performed by: PHYSICIAN ASSISTANT

## 2019-09-26 PROCEDURE — 99214 OFFICE O/P EST MOD 30 MIN: CPT | Performed by: PHYSICIAN ASSISTANT

## 2019-09-26 RX ORDER — SPIRONOLACTONE 50 MG/1
TABLET, FILM COATED ORAL
Qty: 60 TABLET | Refills: 2 | Status: SHIPPED | OUTPATIENT
Start: 2019-09-26 | End: 2019-12-06

## 2019-09-26 RX ORDER — TRETINOIN 0.5 MG/G
CREAM TOPICAL
Qty: 45 G | Refills: 3 | Status: SHIPPED | OUTPATIENT
Start: 2019-09-26 | End: 2021-01-27

## 2019-09-26 NOTE — PROGRESS NOTES
HPI:  Jeannette Quesada is a 34 year old female patient here today for acne on face .  Patient states this has been present for years, since puberty.  Patient reports the following symptoms: breakout and scarring .  Patient reports the following previous treatments: otc, proactive, OCP, and  with minimal improvement. Patient reports the following modifying factors: none.  Associated symptoms: none.  Patient has no other skin complaints today.  Remainder of the HPI, Meds, PMH, Allergies, FH, and SH was reviewed in chart.    Pertinent Hx:   Acne vulgaris  History reviewed. No pertinent past medical history.    Past Surgical History:   Procedure Laterality Date      SECTION       CHOLECYSTECTOMY, LAPOROSCOPIC       TUBAL LIGATION          Family History   Problem Relation Age of Onset     Family History Negative Mother      Family History Negative Father        Social History     Socioeconomic History     Marital status:      Spouse name: Not on file     Number of children: Not on file     Years of education: Not on file     Highest education level: Not on file   Occupational History     Not on file   Social Needs     Financial resource strain: Not on file     Food insecurity:     Worry: Not on file     Inability: Not on file     Transportation needs:     Medical: Not on file     Non-medical: Not on file   Tobacco Use     Smoking status: Former Smoker     Packs/day: 0.00     Types: Cigarettes     Last attempt to quit: 2018     Years since quittin.2     Smokeless tobacco: Never Used   Substance and Sexual Activity     Alcohol use: Yes     Comment: Rare     Drug use: No     Sexual activity: Yes     Birth control/protection: Female Surgical     Comment: Tubal   Lifestyle     Physical activity:     Days per week: Not on file     Minutes per session: Not on file     Stress: Not on file   Relationships     Social connections:     Talks on phone: Not on file     Gets together: Not on file      Attends Evangelical service: Not on file     Active member of club or organization: Not on file     Attends meetings of clubs or organizations: Not on file     Relationship status: Not on file     Intimate partner violence:     Fear of current or ex partner: Not on file     Emotionally abused: Not on file     Physically abused: Not on file     Forced sexual activity: Not on file   Other Topics Concern     Parent/sibling w/ CABG, MI or angioplasty before 65F 55M? Not Asked   Social History Narrative     Not on file       Outpatient Encounter Medications as of 9/26/2019   Medication Sig Dispense Refill     buPROPion (WELLBUTRIN XL) 300 MG 24 hr tablet TAKE 1 TABLET(300 MG) BY MOUTH DAILY 90 tablet 0     pregabalin (LYRICA) 150 MG capsule Take 1 capsule (150 mg) by mouth 2 times daily 60 capsule 0     QUEtiapine (SEROQUEL) 100 MG tablet TAKE 2 TABLETS BY MOUTH AT BEDTIME 180 tablet 1     sertraline (ZOLOFT) 100 MG tablet TAKE 1 TABLET BY MOUTH DAILY 90 tablet 0     SUMAtriptan (IMITREX) 100 MG tablet TAKE 1 TABLET(100 MG) BY MOUTH AT ONSET OF HEADACHE FOR MIGRAINE. MAY REPEAT IN 2 HOURS. MAX 2 TABLETS/ 24 HOURS 9 tablet 6     SUMAtriptan (IMITREX) 100 MG tablet TK 1 T PO PRN FOR MIGRAINE. MAY REPEAT AFTER 1-2 HOURS IF INEFFECTIVE. MAX 2 T PER 24 HOURS.       No facility-administered encounter medications on file as of 9/26/2019.        Review Of Systems:  Skin: As above  Eyes: negative  Ears/Nose/Throat: negative  Respiratory: No shortness of breath, dyspnea on exertion, cough, or hemoptysis  Cardiovascular: negative  Gastrointestinal: negative  Genitourinary: negative  Musculoskeletal: negative  Neurologic: negative  Psychiatric: negative  Hematologic/Lymphatic/Immunologic: negative  Endocrine: negative      Objective:     /64   Breastfeeding? No   Eyes: Conjunctivae/lids: Normal   ENT: Lips:  Normal  MSK: Normal  Cardiovascular: Peripheral edema none  Pulm: Breathing Normal  Neuro/Psych: Orientation: Normal;  Mood/Affect: Normal, NAD, WDWN  Pt accompanied by: self  Following areas examined: face, neck, chest, shoulders, upper back  Lentz skin type:ii   Findings:  Moderate atrophic macules on face  Few inflammatory papules on face  Light brown/pink smooth macules on face  Assessment and Plan:  1) Acne vulgaris, PIH, acne scarring  Disc peels and laser. Disc cosmetic cost  Pt is considering accutane. Will need a letter of clearance-pt has a history of anxiety and depression.   accutane handout given.     Morning regimen:    Wash with either a gentle cleanser such as Cetaphil gentle cleanser or Benzoyl peroxide wash 5%  Apply a gentle moisturizer*  Take spironolactone  Creatinine and potassium today  Evening regimen:    Wash with either a gentle cleanser such as Cetaphil gentle cleanser or Benzoyl peroxide wash 5%  Apply tretinoin  Apply a gentle moisturizer (do not need sunscreen at night)      *Facial moisturizer (preferably with an SPF of 30 or greater) such as Cetaphil, CeraVe, or Vanicream. Make sure lotion is non-comedogenic. Sunscreen active ingredients: Physical blockers are less likely to clog pores. Examples include titanium dioxide and zinc oxide  Good body moisturizers include Cetaphil, CeraVe, Eucerin, and Vanicream.    Disc Tretinoin at bedtime, dryness, irritation and way to prevent discussed with patient   Benzoyl Peroxide (BPO) wash daily or every other day depending on dryness  (2.5%-5% BPO on face and 5%-10% on chest and back)  Aggressive use of bland emollients discussed with patient   If taking Doxycycline take with food but avoid calcium-rich foods as this can reduce the efficacy of Doxycycline. Side effects of doxycyline GI upset, esophagitis, and sun sensitivity.   Potential side effects of oral antibiotic N/V/D, rash, allergic reaction, pigment changes, and dizziness.     May take 2-3 months to see 50-70% improvement  May get worse during initial phase of treatment    Pathophysiology discussed  with patient and information provided.  I discussed with patient oral versus topical treatments such as oral antibiotics, Spironolactone (Aldactone)(women only),oral contraceptive pills (women only) topical creams,  and over-the-counter treatments.     Acne can be effectively treated, although response may sometimes be slow.   Where possible, avoid excessively humid conditions such as a sauna, working in an unventilated kitchen or tropical vacations.   If you smoke, stop. Nicotine increases sebum retention and increased scale within the follicles, forming comedones (black and whiteheads).    Minimize the application of oils and cosmetics to the affected skin.  Abrasive skin treatments can aggravate acne.   Try not to scratch or pick the spots as this can increase your risk for permanent scarring in the area.   To avoid sunburn, protect your skin outdoors using a broad spectrum (UVB and UVA) sunscreen and protective clothing.  No relationship between particular foods and acne has been proven. However, reports suggest low glycemic and low dairy diet are helpful for some people.  Follow up in 2-3 months

## 2019-09-26 NOTE — LETTER
2019         RE: Jeannette Quesada  [address] [address]        Dear Colleague,    Thank you for referring your patient, [place]ann[place]uesada, [place] [facility] Please see a copy of my visit note below.    HPI:  Jeannette Quesada is a 34 year old female patient here today for acne on face .  Patient states this has been present for years, since puberty.  Patient reports the following symptoms: breakout and scarring .  Patient reports the following previous treatments: otc, proactive, OCP, and  with minimal improvement. Patient reports the following modifying factors: none.  Associated symptoms: none.  Patient has no other skin complaints today.  Remainder of the HPI, Meds, PMH, Allergies, FH, and SH was reviewed in chart.    Pertinent Hx:   Acne vulgaris  History reviewed. No pertinent past medical history.    Past Surgical History:   Procedure Laterality Date      SECTION       CHOLECYSTECTOMY, LAPOROSCOPIC       TUBAL LIGATION          Family History   Problem Relation Age of Onset     Family History Negative Mother      Family History Negative Father        Social History     Socioeconomic History     Marital status:      Spouse name: Not on file     Number of children: Not on file     Years of education: Not on file     Highest education level: Not on file   Occupational History     Not on file   Social Needs     Financial resource strain: Not on file     Food insecurity:     Worry: Not on file     Inability: Not on file     Transportation needs:     Medical: Not on file     Non-medical: Not on file   Tobacco Use     Smoking status: Former Smoker     Packs/day: 0.00     Types: Cigarettes     Last attempt to quit: 2018     Years since quittin.2     Smokeless tobacco: Never Used   Substance and Sexual Activity     Alcohol use: Yes     Comment: Rare     Drug use: No     Sexual activity: Yes     Birth control/protection: Female Surgical      Comment: Tubal   Lifestyle     Physical activity:     Days per week: Not on file     Minutes per session: Not on file     Stress: Not on file   Relationships     Social connections:     Talks on phone: Not on file     Gets together: Not on file     Attends Episcopal service: Not on file     Active member of club or organization: Not on file     Attends meetings of clubs or organizations: Not on file     Relationship status: Not on file     Intimate partner violence:     Fear of current or ex partner: Not on file     Emotionally abused: Not on file     Physically abused: Not on file     Forced sexual activity: Not on file   Other Topics Concern     Parent/sibling w/ CABG, MI or angioplasty before 65F 55M? Not Asked   Social History Narrative     Not on file       Outpatient Encounter Medications as of 9/26/2019   Medication Sig Dispense Refill     buPROPion (WELLBUTRIN XL) 300 MG 24 hr tablet TAKE 1 TABLET(300 MG) BY MOUTH DAILY 90 tablet 0     pregabalin (LYRICA) 150 MG capsule Take 1 capsule (150 mg) by mouth 2 times daily 60 capsule 0     QUEtiapine (SEROQUEL) 100 MG tablet TAKE 2 TABLETS BY MOUTH AT BEDTIME 180 tablet 1     sertraline (ZOLOFT) 100 MG tablet TAKE 1 TABLET BY MOUTH DAILY 90 tablet 0     SUMAtriptan (IMITREX) 100 MG tablet TAKE 1 TABLET(100 MG) BY MOUTH AT ONSET OF HEADACHE FOR MIGRAINE. MAY REPEAT IN 2 HOURS. MAX 2 TABLETS/ 24 HOURS 9 tablet 6     SUMAtriptan (IMITREX) 100 MG tablet TK 1 T PO PRN FOR MIGRAINE. MAY REPEAT AFTER 1-2 HOURS IF INEFFECTIVE. MAX 2 T PER 24 HOURS.       No facility-administered encounter medications on file as of 9/26/2019.        Review Of Systems:  Skin: As above  Eyes: negative  Ears/Nose/Throat: negative  Respiratory: No shortness of breath, dyspnea on exertion, cough, or hemoptysis  Cardiovascular: negative  Gastrointestinal: negative  Genitourinary: negative  Musculoskeletal: negative  Neurologic: negative  Psychiatric:  negative  Hematologic/Lymphatic/Immunologic: negative  Endocrine: negative      Objective:     /64   Breastfeeding? No   Eyes: Conjunctivae/lids: Normal   ENT: Lips:  Normal  MSK: Normal  Cardiovascular: Peripheral edema none  Pulm: Breathing Normal  Neuro/Psych: Orientation: Normal; Mood/Affect: Normal, NAD, WDWN  Pt accompanied by: self  Following areas examined: face, neck, chest, shoulders, upper back  Lentz skin type:ii   Findings:  Moderate atrophic macules on face  Few inflammatory papules on face  Light brown/pink smooth macules on face  Assessment and Plan:  1) Acne vulgaris, PIH, acne scarring  Disc peels and laser. Disc cosmetic cost  Pt is considering accutane. Will need a letter of clearance-pt has a history of anxiety and depression.   accutane handout given.     Morning regimen:    Wash with either a gentle cleanser such as Cetaphil gentle cleanser or Benzoyl peroxide wash 5%  Apply a gentle moisturizer*  Take spironolactone  Creatinine and potassium today  Evening regimen:    Wash with either a gentle cleanser such as Cetaphil gentle cleanser or Benzoyl peroxide wash 5%  Apply tretinoin  Apply a gentle moisturizer (do not need sunscreen at night)      *Facial moisturizer (preferably with an SPF of 30 or greater) such as Cetaphil, CeraVe, or Vanicream. Make sure lotion is non-comedogenic. Sunscreen active ingredients: Physical blockers are less likely to clog pores. Examples include titanium dioxide and zinc oxide  Good body moisturizers include Cetaphil, CeraVe, Eucerin, and Vanicream.    Disc Tretinoin at bedtime, dryness, irritation and way to prevent discussed with patient   Benzoyl Peroxide (BPO) wash daily or every other day depending on dryness  (2.5%-5% BPO on face and 5%-10% on chest and back)  Aggressive use of bland emollients discussed with patient   If taking Doxycycline take with food but avoid calcium-rich foods as this can reduce the efficacy of Doxycycline. Side effects  of doxycyline GI upset, esophagitis, and sun sensitivity.   Potential side effects of oral antibiotic N/V/D, rash, allergic reaction, pigment changes, and dizziness.     May take 2-3 months to see 50-70% improvement  May get worse during initial phase of treatment    Pathophysiology discussed with patient and information provided.  I discussed with patient oral versus topical treatments such as oral antibiotics, Spironolactone (Aldactone)(women only),oral contraceptive pills (women only) topical creams,  and over-the-counter treatments.     Acne can be effectively treated, although response may sometimes be slow.   Where possible, avoid excessively humid conditions such as a sauna, working in an unventilated kitchen or tropical vacations.   If you smoke, stop. Nicotine increases sebum retention and increased scale within the follicles, forming comedones (black and whiteheads).    Minimize the application of oils and cosmetics to the affected skin.  Abrasive skin treatments can aggravate acne.   Try not to scratch or pick the spots as this can increase your risk for permanent scarring in the area.   To avoid sunburn, protect your skin outdoors using a broad spectrum (UVB and UVA) sunscreen and protective clothing.  No relationship between particular foods and acne has been proven. However, reports suggest low glycemic and low dairy diet are helpful for some people.  Follow up in 2-3 months      Again, thank you for allowing me to participate in the care of your patient.        Sincerely,        Zarina Martin PA-C

## 2019-09-26 NOTE — PATIENT INSTRUCTIONS
Proper skin care from [facility]:    -Eliminate harsh soaps as they strip the natural oils from the skin, often resulting in dry itchy skin ( i.e. Dial, Zest, Arpita Spring)  -Use mild soaps such as Cetaphil or Dove Sensitive Skin in the shower. You do not need to use soap on arms, legs, and trunk every time you shower unless visibly soiled.   -Avoid hot or cold showers.  -After showering, lightly dry off and apply moisturizing within 2-3 minutes. This will help trap moisture in the skin.   -Aggressive use of a moisturizer at least 1-2 times a day to the entire body (including -Vanicream, Cetaphil, Aquaphor or Cerave) and moisturize hands after every washing.  -We recommend using moisturizers that come in a tub that needs to be scooped out, not a pump. This has more of an oil base. It will hold moisture in your skin much better than a water base moisturizer. The above recommended are non-pore clogging.      Wear a sunscreen with at least SPF 30 on your face, ears, neck and V of the chest daily. Wear sunscreen on other areas of the body if those areas are exposed to the sun throughout the day. Sunscreens can contain physical and/or chemical blockers. Physical blockers are less likely to clog pores, these include zinc oxide and titanium dioxide. Reapply every two hour and after swimming. Sunscreen examples include Neutrogena, CeraVe, Blue Lizard, Elta MD and many others.    UV radiation  UVA radiation remains constant throughout the day and throughout the year. It is a longer wavelength than UVB and therefore penetrates deeper into the skin leading to immediate and delayed tanning, photoaging, and skin cancer. 70-80% of UVA and UVB radiation occurs between the hours of 10am-2pm.  UVB radiation  UVB radiation causes the most harmful effects and is more significant during the summer months. However, snow and ice can reflect UVB radiation leading to skin damage during the winter months as well. UVB radiation is  responsible for tanning, burning, inflammation, delayed erythema (pinkness), pigmentation (brown spots), and skin cancer.       Acne vulgaris, PIH    Morning regimen:    Wash with either a gentle cleanser such as Cetaphil gentle cleanser or Benzoyl peroxide wash 5%  Apply a gentle moisturizer*  Take spironolactone    Evening regimen:    Wash with either a gentle cleanser such as Cetaphil gentle cleanser or Benzoyl peroxide wash 5%  Apply tretinoin  Apply a gentle moisturizer (do not need sunscreen at night)      *Facial moisturizer (preferably with an SPF of 30 or greater) such as Cetaphil, CeraVe, or Vanicream. Make sure lotion is non-comedogenic. Sunscreen active ingredients: Physical blockers are less likely to clog pores. Examples include titanium dioxide and zinc oxide  Good body moisturizers include Cetaphil, CeraVe, Eucerin, and Vanicream.    Disc Tretinoin at bedtime, dryness, irritation and way to prevent discussed with patient   Benzoyl Peroxide (BPO) wash daily or every other day depending on dryness  (2.5%-5% BPO on face and 5%-10% on chest and back)  Aggressive use of bland emollients discussed with patient   If taking Doxycycline take with food but avoid calcium-rich foods as this can reduce the efficacy of Doxycycline. Side effects of doxycyline GI upset, esophagitis, and sun sensitivity.   Potential side effects of oral antibiotic N/V/D, rash, allergic reaction, pigment changes, and dizziness.     May take 2-3 months to see 50-70% improvement  May get worse during initial phase of treatment    Pathophysiology discussed with patient and information provided.  I discussed with patient oral versus topical treatments such as oral antibiotics, Spironolactone (Aldactone)(women only),oral contraceptive pills (women only) topical creams,  and over-the-counter treatments.     Acne can be effectively treated, although response may sometimes be slow.   Where possible, avoid excessively humid conditions such as  a sauna, working in an unventilated kitchen or tropical vacations.   If you smoke, stop. Nicotine increases sebum retention and increased scale within the follicles, forming comedones (black and whiteheads).    Minimize the application of oils and cosmetics to the affected skin.  Abrasive skin treatments can aggravate acne.   Try not to scratch or pick the spots as this can increase your risk for permanent scarring in the area.   To avoid sunburn, protect your skin outdoors using a broad spectrum (UVB and UVA) sunscreen and protective clothing.  No relationship between particular foods and acne has been proven. However, reports suggest low glycemic and low dairy diet are helpful for some people.    ACNE INFORMATION     Acne is a skin disease affecting oil glands and hair follicles in your skin.  When these pores do not drain properly, hair follicles can becomes clogged and bacteria can be trapped causing inflammation to occur. Clinical manifestations range from mild to severe, such as comedones (whiteheads and blackheads) or cysts.     Several factors contribute to acne:     1. Hormonal- Androgen (a type of hormone) can cause oil glands to enlarges and produces more sebum (oil).    2. Bacterial- A specific type of bacteria called Propionibacterium acnes are found in these oily follicles and stimulate more inflammation.     3. Genetics- History of family members (parents or siblings)     4. External factors- mechanical trauma, cosmetics, topical steroids or some oral medications.      Combination therapy is the mainstay of treatment given the multiple factors contributing to acne.  The type of treatment is also dependant on whether you are pregnancy or breastfeeding.     TOPICAL TREATMENTS   Topical Antibiotics These medications help prevent growth of bacteria in your pores.   Topical Exfoliating Agent These are drying agents that will help normalize oil production, unplug pores and reduce bacterial growth. (Note:  Make sure you discontinue this medication ONE week prior to waxing or your skin may lift.)     ORAL TREATMENTS   Oral Antibiotics: Tetracyclines are the most commonly used oral antibiotics to treat moderate to severe acne. They are safe to take for months at a time. Some side effects to these medications include: sun sensitivity, stomach upset, dizziness and heartburn. If you experience a sudden onset of rash or severe unusual headache, discontinue the medication and contact your clinician immediately.     Spironolactone: This oral medication blocks androgens (hormones that can aggravate acne).  Since this medication is also used as a diuretic, baseline blood work is needed to check your electrolytes and liver function. Do not get pregnant while on this medication as it can cause birth defects. Make sure to drink plenty of water.    Birth Control Pills (Females only). In order to decrease hormonal fluctuations that affect acne, birth control pills such as Amarilis  or Marta  ( and others) can be effective in treating acne.  We advise you to discuss with your OB/GYN or Primary Care Doctor to be screened and to check if you are eligible to be on this pill.     Accutane  (generic: isotretinoin). This oral medication is a retinoid (Vitamin A derivative like Retin-A) used to treat severe acne that does not respond to the above medications. Accutane  can  help clear acne for years and the average period of treatment is five to seven months, however, the duration of treatment may be adjusted based on severity. Some people may need more than one treatment.     ALTERNATIVE TREATMENTS   Microdermabrasion & light chemical peels help improve skin texture, decrease pore size, decrease mild scarring & increase absorption of your topical treatments     Blue Spectrum Light Therapy consists of a concentrated visible light that kills bacteria in the oil ducts. It is safe for pregnancy & nursing. At times, ALA (a topical solution) may be  applied prior to the Blue Light exposure in order to enhance light penetration.     Pulse Dye Laser (PDL) decreases inflammation and improves certain acne scars.     Recommended facial cleansers, moisturizers & cosmetics:   Cetaphil  CeraVe  (Note: Look for  non-comeodogenic  cleansers & moisturizers)   Clinique , Prescriptives  make-up

## 2019-09-27 LAB
CREAT SERPL-MCNC: 0.69 MG/DL (ref 0.52–1.04)
GFR SERPL CREATININE-BSD FRML MDRD: >90 ML/MIN/{1.73_M2}
POTASSIUM SERPL-SCNC: 3.8 MMOL/L (ref 3.4–5.3)

## 2019-09-27 RX ORDER — BUPROPION HYDROCHLORIDE 300 MG/1
TABLET ORAL
Qty: 90 TABLET | Refills: 0 | Status: SHIPPED | OUTPATIENT
Start: 2019-09-27 | End: 2020-01-08

## 2019-11-08 DIAGNOSIS — F33.1 MODERATE EPISODE OF RECURRENT MAJOR DEPRESSIVE DISORDER (H): ICD-10-CM

## 2019-11-08 NOTE — TELEPHONE ENCOUNTER
"Requested Prescriptions   Pending Prescriptions Disp Refills     buPROPion (WELLBUTRIN XL) 300 MG 24 hr tablet [Pharmacy Med Name: BUPROPION XL 300MG TABLETS] 60 tablet 0     Sig: TAKE 1 TABLET BY MOUTH DAILY   Last Written Prescription Date:  9/27/19  Last Fill Quantity: 90,  # refills: 0   Last office visit: 9/26/2019 with prescribing provider:  Zarina Martin PA-C    Future Office Visit:   Next 5 appointments (look out 90 days)    Dec 06, 2019 11:00 AM CST  Return Visit with Zarina Martin PA-C  [facility] ([address] [id number] [phone]             SSRIs Protocol Failed - 11/8/2019  3:21 AM        Failed - PHQ-9 score less than 5 in past 6 months     Please review last PHQ-9 score.   PHQ-9 SCORE 9/19/2018 10/10/2018 3/14/2019   PHQ-9 Total Score 17 11 9     JOEY-7 SCORE 9/19/2018 10/10/2018 3/14/2019   Total Score 19 11 7               Passed - Medication is Bupropion     If the medication is Bupropion (Wellbutrin), and the patient is taking for smoking cessation; OK to refill.          Passed - Medication is active on med list        Passed - Patient is age 18 or older        Passed - No active pregnancy on record        Passed - No positive pregnancy test in last 12 months        Passed - Recent (6 mo) or future (30 days) visit within the authorizing provider's specialty     Patient had office visit in the last 6 months or has a visit in the next 30 days with authorizing provider or within the authorizing provider's specialty.  See \"Patient Info\" tab in inbasket, or \"Choose Columns\" in Meds & Orders section of the refill encounter.            QUEtiapine (SEROQUEL) 100 MG tablet [Pharmacy Med Name: QUETIAPINE 100MG TABLETS] 180 tablet 0     Sig: TAKE 2 TABLETS BY MOUTH AT BEDTIME   Last Written Prescription Date:  11/7/19  Last Fill Quantity: 60,  # refills: 0   Last office visit: 9/26/2019 with prescribing " "provider:  Zarina Martin PA-C    Future Office Visit:   Next 5 appointments (look out 90 days)    Dec 06, 2019 11:00 AM CST  Return Visit with Zarina Martin PA-C  [facility] ([facility]) [address] [place] [id number]  [phone]             Antipsychotic Medications Failed - 11/8/2019  3:21 AM        Failed - Lipid panel on file within the past 12 months     No lab results found.            Failed - CBC on file in past 12 months     Recent Labs   Lab Test 10/24/18  1104   WBC 5.2   RBC 4.56   HGB 11.5*   HCT 38.0                    Failed - A1c or Glucose on file in past 12 months     Recent Labs   Lab Test 09/19/18  1144   GLC 84       Please review patients last 3 weights. If a weight gain of >10 lbs exists, you may refill the prescription once after instructing the patient to schedule an appointment within the next 30 days.    Wt Readings from Last 3 Encounters:   03/21/19 69.6 kg (153 lb 8 oz)   03/14/19 70.8 kg (156 lb)   01/09/19 67.7 kg (149 lb 4.8 oz)             Passed - Blood pressure under 140/90 in past 12 months     BP Readings from Last 3 Encounters:   09/26/19 100/64   03/21/19 112/60   03/14/19 108/64                 Passed - Patient is 12 years of age or older        Passed - Heart Rate on file within past 12 months     Pulse Readings from Last 3 Encounters:   03/21/19 106   03/14/19 91   01/09/19 94               Passed - Medication is active on med list        Passed - Patient is not pregnant        Passed - No positve pregnancy test on file in past 12 months        Passed - Recent (6 mo) or future (30 days) visit within the authorizing provider's specialty     Patient had office visit in the last 6 months or has a visit in the next 30 days with authorizing provider or within the authorizing provider's specialty.  See \"Patient Info\" tab in inbasket, or \"Choose Columns\" in Meds & Orders section of the refill " encounter.

## 2019-11-08 NOTE — TELEPHONE ENCOUNTER
Attempt to contact patient to obtain PHQ-9.  Mail box is full and can not accept new messages at this time.    Will need to contact again later  Tiara VILLAFANA - Registered Nurse  [facility]  Acute and Diagnostic Services

## 2019-11-13 RX ORDER — QUETIAPINE FUMARATE 100 MG/1
TABLET, FILM COATED ORAL
Qty: 180 TABLET | Refills: 0
Start: 2019-11-13

## 2019-11-13 RX ORDER — BUPROPION HYDROCHLORIDE 300 MG/1
TABLET ORAL
Qty: 60 TABLET | Refills: 0
Start: 2019-11-13

## 2019-11-13 NOTE — TELEPHONE ENCOUNTER
Spoke to patient she stated that she is getting all psych medications filled by another provider, who she will see this morning.   Does not need refills at this time.   Did not answer questionnaires at this time.     Patricia Romero RN Flex

## 2019-12-06 ENCOUNTER — OFFICE VISIT (OUTPATIENT)
Dept: FAMILY MEDICINE | Facility: CLINIC | Age: 34
End: 2019-12-06
Payer: COMMERCIAL

## 2019-12-06 VITALS — DIASTOLIC BLOOD PRESSURE: 64 MMHG | SYSTOLIC BLOOD PRESSURE: 110 MMHG

## 2019-12-06 DIAGNOSIS — Z51.81 MEDICATION MONITORING ENCOUNTER: Primary | ICD-10-CM

## 2019-12-06 DIAGNOSIS — L70.0 ACNE VULGARIS: ICD-10-CM

## 2019-12-06 DIAGNOSIS — L85.3 XEROSIS OF SKIN: ICD-10-CM

## 2019-12-06 DIAGNOSIS — L90.5 ACNE SCARRING: ICD-10-CM

## 2019-12-06 LAB — HCG UR QL: NEGATIVE

## 2019-12-06 PROCEDURE — 81025 URINE PREGNANCY TEST: CPT | Performed by: PHYSICIAN ASSISTANT

## 2019-12-06 PROCEDURE — 99213 OFFICE O/P EST LOW 20 MIN: CPT | Performed by: PHYSICIAN ASSISTANT

## 2019-12-06 RX ORDER — SPIRONOLACTONE 50 MG/1
TABLET, FILM COATED ORAL
Qty: 60 TABLET | Refills: 0 | Status: SHIPPED | OUTPATIENT
Start: 2019-12-06 | End: 2020-01-28

## 2019-12-06 NOTE — PROGRESS NOTES
HPI:  Jeannette Quesada is a 34 year old female patient here today for follow up acne on face .  Patient states this has been present for years, since puberty.  Patient reports the following symptoms: breakout and scarring .  Patient reports the following previous treatments: otc, proactive, OCP, and  with minimal improvement. LOV start spironolactone 50mg BID, tretinoin 0.05% and BPO wash 5% with some improvement but still breaking out.  Pt is tolerating medication well. Patient reports the following modifying factors: none.  Associated symptoms: none.  Patient has no other skin complaints today.  Remainder of the HPI, Meds, PMH, Allergies, FH, and SH was reviewed in chart.    Pertinent Hx:   Acne vulgaris  No past medical history on file.    Past Surgical History:   Procedure Laterality Date      SECTION       CHOLECYSTECTOMY, LAPOROSCOPIC       TUBAL LIGATION          Family History   Problem Relation Age of Onset     Family History Negative Mother      Family History Negative Father        Social History     Socioeconomic History     Marital status:      Spouse name: Not on file     Number of children: Not on file     Years of education: Not on file     Highest education level: Not on file   Occupational History     Not on file   Social Needs     Financial resource strain: Not on file     Food insecurity:     Worry: Not on file     Inability: Not on file     Transportation needs:     Medical: Not on file     Non-medical: Not on file   Tobacco Use     Smoking status: Former Smoker     Packs/day: 0.00     Types: Cigarettes     Last attempt to quit: 2018     Years since quittin.4     Smokeless tobacco: Never Used   Substance and Sexual Activity     Alcohol use: Yes     Comment: Rare     Drug use: No     Sexual activity: Yes     Birth control/protection: Female Surgical     Comment: Tubal   Lifestyle     Physical activity:     Days per week: Not on file     Minutes per session: Not  on file     Stress: Not on file   Relationships     Social connections:     Talks on phone: Not on file     Gets together: Not on file     Attends Buddhist service: Not on file     Active member of club or organization: Not on file     Attends meetings of clubs or organizations: Not on file     Relationship status: Not on file     Intimate partner violence:     Fear of current or ex partner: Not on file     Emotionally abused: Not on file     Physically abused: Not on file     Forced sexual activity: Not on file   Other Topics Concern     Parent/sibling w/ CABG, MI or angioplasty before 65F 55M? Not Asked   Social History Narrative     Not on file       Outpatient Encounter Medications as of 12/6/2019   Medication Sig Dispense Refill     benzoyl peroxide (BENZOYL PEROXIDE WASH) 5 % external liquid Wash area affected by acne once a day in the shower. 226 g 3     buPROPion (WELLBUTRIN XL) 300 MG 24 hr tablet TAKE 1 TABLET(300 MG) BY MOUTH DAILY 90 tablet 0     pregabalin (LYRICA) 150 MG capsule TAKE 1 CAPSULE BY MOUTH TWICE DAILY 60 capsule 0     QUEtiapine (SEROQUEL) 100 MG tablet TAKE 2 TABLETS BY MOUTH AT BEDTIME 60 tablet 0     sertraline (ZOLOFT) 100 MG tablet TAKE 1 TABLET BY MOUTH DAILY 90 tablet 0     sertraline (ZOLOFT) 50 MG tablet TK 1 T PO D  1     spironolactone (ALDACTONE) 50 MG tablet Take one by mouth in the AM and one in the PM. 60 tablet 2     SUMAtriptan (IMITREX) 100 MG tablet TAKE 1 TABLET(100 MG) BY MOUTH AT ONSET OF HEADACHE FOR MIGRAINE. MAY REPEAT IN 2 HOURS. MAX 2 TABLETS/ 24 HOURS 9 tablet 6     SUMAtriptan (IMITREX) 100 MG tablet TK 1 T PO PRN FOR MIGRAINE. MAY REPEAT AFTER 1-2 HOURS IF INEFFECTIVE. MAX 2 T PER 24 HOURS.       tretinoin (RETIN-A) 0.05 % external cream Apply a pea-sized amount to each area affected by acne. Start every 3-4 nights working up to every night. 45 g 3     No facility-administered encounter medications on file as of 12/6/2019.        Review Of Systems:  Skin: As  above  Eyes: negative  Ears/Nose/Throat: negative  Respiratory: No shortness of breath, dyspnea on exertion, cough, or hemoptysis  Cardiovascular: negative  Gastrointestinal: negative  Genitourinary: negative  Musculoskeletal: negative  Neurologic: negative  Psychiatric: negative  Hematologic/Lymphatic/Immunologic: negative  Endocrine: negative      Objective:     There were no vitals taken for this visit.  Eyes: Conjunctivae/lids: Normal   ENT: Lips:  Normal  MSK: Normal  Cardiovascular: Peripheral edema none  Pulm: Breathing Normal  Neuro/Psych: Orientation: Normal; Mood/Affect: Normal, NAD, WDWN  Pt accompanied by: self  Following areas examined: face, neck, upper chest, hands  Lentz skin type:ii   Findings:  Moderate atrophic macules on face  Few inflammatory papules on face  Light brown/pink smooth macules on face  Assessment and Plan:  1) acne vulgaris, PIH, acne scarring, xerosis  Continue benzoyl peroxide wash and spironolactone. Stop tretinoin for now.  Will need a letter of clearance-pt has a history of anxiety and depression.   Standing CBC, CMP, and fasting lipids (for females include urine pregnancy test)  Ipledge reviewed with patient and Ipledge consent form complete  Patient place in ipledge system  Ipledge: [phone]  Target:  150mg/k  200mg/k  220mg/k  Weight 68 kg  Birth control:  1st:tubal ligation  2nd:condoms  Urine pregnancy test:neg  Return to clinic 30 days  History of depression  Isotretinoin (Accutane) education:  Do not share medication, do not donate blood, and do not get pregnant while on accutane.  While on Accutane: do not use other topical acne medications. Do not share medication. Do not get pregnant (including one month after stopping medication). Do not donate blood. Do not wax. Do not get laser, peels, or aggressive facials while on Accutane of for 6 months after.   Females must  their prescription within 7 days of having urine pregnancy  test.  Dry lips and mouth, minor swelling of the eyelids or lips, crusty skin, nosebleeds, GI upset, or thinning of hair may occur. If any of these effects persist or worsen, tell your doctor or pharmacist promptly.   To relieve dry mouth, suck on (sugarless) hard candy or ice chips, chew (sugarless) gum, drink water.   Remember that your doctor has prescribed this medication because he or she has judged that the benefit to you is greater than the risk of side effects. Many people using this medication do not have serious side effects.   Contact office immediately if you have any of these unlikely but serious side effects: mental/mood changes (e.g., depression,  aggressive or violent behavior, and in rare cases, thoughts of suicide), tingling feeling in the skin, quick/severe sun sensitivity, back/joint/muscle pain, signs of infection (e.g., fever, persistent sore throat, painful swallowing, peeling skin on palms/soles.   Isotretinoin may infrequently cause disease of the pancreatitis, that may rarely be fatal. Stop taking this medication and contact office immediately if you develop: severe stomach pain severe or persistent GI upset,   Stop taking this medication and tell your doctor immediately if you develop these unlikely but very serious side effects: severe headache, vision changes, ear ringing, hearling loss, chest pain, yellowing eyes, skin, dark urine, severe diarrhea, rectal bleeding,   Seek immediate medical attention if you notice any symptoms of a serious allergic reaction.  Wait 6 months after stopping accutane before getting piercing, tattoos, and/or laser treatment.    Accutane is discussed fully with the patient. It is a very effective drug to treat acne vulgaris but has many potential significant side effects. Chief among these are teratogensis, hepatic injury, dyslipidemia and severe drying of the mucous membranes. All of these issues have been discussed in details. Monthly blood tests to monitor  lipids and liver functions will be necessary. Expect painful dryness and/or fissuring around the lips, eyes, and other moist areas of the body. Balms may be protective. Contact lens may be too painful to wear temporarily while on this drug. Episodes of significant depression have been reported, including suicidal ideation and attempts in rare cases. It may also cause pseudotumor cerebri and hyperostosis. The patient will report any such changes in mood, depressive symptoms or suicidal thoughts, headaches, joint or bone pains. There is also a possible association with inflammatory bowel disease, although this is unproven at this point.       Follow up in 30 days

## 2019-12-06 NOTE — LETTER
2019         RE: Jeannette Quesada  [address] [facility] [address]        Dear Colleague,    Thank you for referring your patient, Jeannette Quesada, to the [facility]. Please see a copy of my visit note below.    HPI:  Jeannette Quesada is a 34 year old female patient here today for  follow up acne on face .  Patient states this has been present for years, since puberty.  Patient reports the following symptoms: breakout and scarring .  Patient reports the following previous treatments: otc, proactive, OCP, and  with minimal improvement.  LOV start spironolactone 50mg BID, tretinoin 0.05% and BPO wash 5% with some improvement but still breaking out.  Pt is tolerating medication well. Patient reports the following modifying factors: none.  Associated symptoms: none.  Patient has no other skin complaints today.  Remainder of the HPI, Meds, PMH, Allergies, FH, and SH was reviewed in chart.    Pertinent Hx:   Acne vulgaris  No past medical history on file.    Past Surgical History:   Procedure Laterality Date      SECTION       CHOLECYSTECTOMY, LAPOROSCOPIC       TUBAL LIGATION          Family History   Problem Relation Age of Onset     Family History Negative Mother      Family History Negative Father        Social History     Socioeconomic History     Marital status:      Spouse name: Not on file     Number of children: Not on file     Years of education: Not on file     Highest education level: Not on file   Occupational History     Not on file   Social Needs     Financial resource strain: Not on file     Food insecurity:     Worry: Not on file     Inability: Not on file     Transportation needs:     Medical: Not on file     Non-medical: Not on file   Tobacco Use     Smoking status: Former Smoker     Packs/day: 0.00     Types: Cigarettes     Last attempt to quit: 2018     Years since quittin.4     Smokeless tobacco: Never Used   Substance and  Sexual Activity     Alcohol use: Yes     Comment: Rare     Drug use: No     Sexual activity: Yes     Birth control/protection: Female Surgical     Comment: Tubal   Lifestyle     Physical activity:     Days per week: Not on file     Minutes per session: Not on file     Stress: Not on file   Relationships     Social connections:     Talks on phone: Not on file     Gets together: Not on file     Attends Advent service: Not on file     Active member of club or organization: Not on file     Attends meetings of clubs or organizations: Not on file     Relationship status: Not on file     Intimate partner violence:     Fear of current or ex partner: Not on file     Emotionally abused: Not on file     Physically abused: Not on file     Forced sexual activity: Not on file   Other Topics Concern     Parent/sibling w/ CABG, MI or angioplasty before 65F 55M? Not Asked   Social History Narrative     Not on file       Outpatient Encounter Medications as of 12/6/2019   Medication Sig Dispense Refill     benzoyl peroxide (BENZOYL PEROXIDE WASH) 5 % external liquid Wash area affected by acne once a day in the shower. 226 g 3     buPROPion (WELLBUTRIN XL) 300 MG 24 hr tablet TAKE 1 TABLET(300 MG) BY MOUTH DAILY 90 tablet 0     pregabalin (LYRICA) 150 MG capsule TAKE 1 CAPSULE BY MOUTH TWICE DAILY 60 capsule 0     QUEtiapine (SEROQUEL) 100 MG tablet TAKE 2 TABLETS BY MOUTH AT BEDTIME 60 tablet 0     sertraline (ZOLOFT) 100 MG tablet TAKE 1 TABLET BY MOUTH DAILY 90 tablet 0     sertraline (ZOLOFT) 50 MG tablet TK 1 T PO D  1     spironolactone (ALDACTONE) 50 MG tablet Take one by mouth in the AM and one in the PM. 60 tablet 2     SUMAtriptan (IMITREX) 100 MG tablet TAKE 1 TABLET(100 MG) BY MOUTH AT ONSET OF HEADACHE FOR MIGRAINE. MAY REPEAT IN 2 HOURS. MAX 2 TABLETS/ 24 HOURS 9 tablet 6     SUMAtriptan (IMITREX) 100 MG tablet TK 1 T PO PRN FOR MIGRAINE. MAY REPEAT AFTER 1-2 HOURS IF INEFFECTIVE. MAX 2 T PER 24 HOURS.       tretinoin  (RETIN-A) 0.05 % external cream Apply a pea-sized amount to each area affected by acne. Start every 3-4 nights working up to every night. 45 g 3     No facility-administered encounter medications on file as of 2019.        Review Of Systems:  Skin: As above  Eyes: negative  Ears/Nose/Throat: negative  Respiratory: No shortness of breath, dyspnea on exertion, cough, or hemoptysis  Cardiovascular: negative  Gastrointestinal: negative  Genitourinary: negative  Musculoskeletal: negative  Neurologic: negative  Psychiatric: negative  Hematologic/Lymphatic/Immunologic: negative  Endocrine: negative      Objective:     There were no vitals taken for this visit.  Eyes: Conjunctivae/lids: Normal   ENT: Lips:  Normal  MSK: Normal  Cardiovascular: Peripheral edema none  Pulm: Breathing Normal  Neuro/Psych: Orientation: Normal; Mood/Affect: Normal, NAD, WDWN  Pt accompanied by: self  Following areas examined: face, neck, upper chest, hands  Lentz skin type:ii   Findings:  Moderate atrophic macules on face  Few inflammatory papules on face  Light brown/pink smooth macules on face  Assessment and Plan:  1) acne vulgaris, PIH, acne scarring, xerosis  Continue benzoyl peroxide wash and spironolactone. Stop tretinoin for now.  Will need a letter of clearance-pt has a history of anxiety and depression.   Standing CBC, CMP, and fasting lipids (for females include urine pregnancy test)  Ipledge reviewed with patient and Ipledge consent form complete  Patient place in ipledge system  Ipledge: [phone]  Target:  150mg/k  200mg/k  220mg/k  Weight 68 kg  Birth control:  1st:tubal ligation  2nd:condoms  Urine pregnancy test:neg  Return to clinic 30 days  History of depression  Isotretinoin (Accutane) education:  Do not share medication, do not donate blood, and do not get pregnant while on accutane.  While on Accutane: do not use other topical acne medications. Do not share medication. Do not get pregnant  (including one month after stopping medication). Do not donate blood. Do not wax. Do not get laser, peels, or aggressive facials while on Accutane of for 6 months after.   Females must  their prescription within 7 days of having urine pregnancy test.  Dry lips and mouth, minor swelling of the eyelids or lips, crusty skin, nosebleeds, GI upset, or thinning of hair may occur. If any of these effects persist or worsen, tell your doctor or pharmacist promptly.   To relieve dry mouth, suck on (sugarless) hard candy or ice chips, chew (sugarless) gum, drink water.   Remember that your doctor has prescribed this medication because he or she has judged that the benefit to you is greater than the risk of side effects. Many people using this medication do not have serious side effects.   Contact office immediately if you have any of these unlikely but serious side effects: mental/mood changes (e.g., depression,  aggressive or violent behavior, and in rare cases, thoughts of suicide), tingling feeling in the skin, quick/severe sun sensitivity, back/joint/muscle pain, signs of infection (e.g., fever, persistent sore throat, painful swallowing, peeling skin on palms/soles.   Isotretinoin may infrequently cause disease of the pancreatitis, that may rarely be fatal. Stop taking this medication and contact office immediately if you develop: severe stomach pain severe or persistent GI upset,   Stop taking this medication and tell your doctor immediately if you develop these unlikely but very serious side effects: severe headache, vision changes, ear ringing, hearling loss, chest pain, yellowing eyes, skin, dark urine, severe diarrhea, rectal bleeding,   Seek immediate medical attention if you notice any symptoms of a serious allergic reaction.  Wait 6 months after stopping accutane before getting piercing, tattoos, and/or laser treatment.    Accutane is discussed fully with the patient. It is a very effective drug to treat  acne vulgaris but has many potential significant side effects. Chief among these are teratogensis, hepatic injury, dyslipidemia and severe drying of the mucous membranes. All of these issues have been discussed in details. Monthly blood tests to monitor lipids and liver functions will be necessary. Expect painful dryness and/or fissuring around the lips, eyes, and other moist areas of the body. Balms may be protective. Contact lens may be too painful to wear temporarily while on this drug. Episodes of significant depression have been reported, including suicidal ideation and attempts in rare cases. It may also cause pseudotumor cerebri and hyperostosis. The patient will report any such changes in mood, depressive symptoms or suicidal thoughts, headaches, joint or bone pains. There is also a possible association with inflammatory bowel disease, although this is unproven at this point.       Follow up in 30 days      Again, thank you for allowing me to participate in the care of your patient.        Sincerely,        Zarina Martin PA-C

## 2019-12-06 NOTE — PATIENT INSTRUCTIONS
Proper skin care from [facility]:    -Eliminate harsh soaps as they strip the natural oils from the skin, often resulting in dry itchy skin ( i.e. Dial, Zest, Arpita Spring)  -Use mild soaps such as Cetaphil or Dove Sensitive Skin in the shower. You do not need to use soap on arms, legs, and trunk every time you shower unless visibly soiled.   -Avoid hot or cold showers.  -After showering, lightly dry off and apply moisturizing within 2-3 minutes. This will help trap moisture in the skin.   -Aggressive use of a moisturizer at least 1-2 times a day to the entire body (including -Vanicream, Cetaphil, Aquaphor or Cerave) and moisturize hands after every washing.  -We recommend using moisturizers that come in a tub that needs to be scooped out, not a pump. This has more of an oil base. It will hold moisture in your skin much better than a water base moisturizer. The above recommended are non-pore clogging.      Wear a sunscreen with at least SPF 30 on your face, ears, neck and V of the chest daily. Wear sunscreen on other areas of the body if those areas are exposed to the sun throughout the day. Sunscreens can contain physical and/or chemical blockers. Physical blockers are less likely to clog pores, these include zinc oxide and titanium dioxide. Reapply every two hour and after swimming. Sunscreen examples include Neutrogena, CeraVe, Blue Lizard, Elta MD and many others.    UV radiation  UVA radiation remains constant throughout the day and throughout the year. It is a longer wavelength than UVB and therefore penetrates deeper into the skin leading to immediate and delayed tanning, photoaging, and skin cancer. 70-80% of UVA and UVB radiation occurs between the hours of 10am-2pm.  UVB radiation  UVB radiation causes the most harmful effects and is more significant during the summer months. However, snow and ice can reflect UVB radiation leading to skin damage during the winter months as well. UVB radiation is  responsible for tanning, burning, inflammation, delayed erythema (pinkness), pigmentation (brown spots), and skin cancer.     Continue benzoyl peroxide wash and spironolactone. Stop tretinoin for now.  Please get letter of clearance.   Accutane education:    Accutane is discussed fully with the patient. It is a very effective drug to treat acne vulgaris but has many significant side effects. Chief among these are teratogensis, hepatic injury, dyslipidemia and severe drying of the mucous membranes. All of these issues have been discussed in details. Monthly blood tests to monitor lipids and liver functions will be necessary. Expect painful dryness and/or fissuring around the lips, eyes, and other moist areas of the body. Balms may be protective. Contact lens may be too painful to wear temporarily while on this drug. Episodes of significant depression have been reported, including suicidal ideation and attempts in rare cases. It may also cause pseudotumor cerebri (idiopathic intracranial hypertension) and hyperostosis (excessive growth of bone). The patient will report any such changes in mood, depressive symptoms or suicidal thoughts, headaches, joint or bone pains.    Female patients MUST use two simultaneous methods of family planning. Accutane is Category X for pregnancy, meaning it will cause fetal teratogenic malformations, and pregnancy MUST be avoided while on this drug.    The dose is 0.5-2 mg/kg in one to two divided doses for 15-20 weeks.    Wait 6 months after stopping accutane before getting piercing, tattoos, and/or laser treatment.    After discussion of these important issues, s/he indicates complete understanding of all of the above, and does wish to proceed with accutane therapy.      Reach out to your provider who is monitoring your anxiety and depression. Will need a letter stating you are okay to start accutane. That your anxiety and depression is under control.  Also reach out to insurance to see  cost of accutane.     Follow up in 30-37 days.

## 2019-12-08 DIAGNOSIS — M79.7 FIBROMYALGIA: ICD-10-CM

## 2019-12-09 NOTE — TELEPHONE ENCOUNTER
Requested Prescriptions   Pending Prescriptions Disp Refills     pregabalin (LYRICA) 150 MG capsule [Pharmacy Med Name: PREGABALIN 150MG CAPSULES] 60 capsule 0     Sig: TAKE 1 CAPSULE BY MOUTH TWICE DAILY   Last Written Prescription Date:  11/7/19  Last Fill Quantity: 60,  # refills: 0   Last office visit: 12/6/2019 with prescribing provider:  Zarina Martin PA-C    Future Office Visit:   Next 5 appointments (look out 90 days)    Bernard 10, 2020  1:00 PM CST  Return Visit with Zarina Martin PA-C  [facility] ([facility]) [address] [place] [id number] [phone]             There is no refill protocol information for this order

## 2019-12-10 DIAGNOSIS — F33.1 MODERATE EPISODE OF RECURRENT MAJOR DEPRESSIVE DISORDER (H): ICD-10-CM

## 2019-12-10 DIAGNOSIS — M79.7 FIBROMYALGIA: ICD-10-CM

## 2019-12-10 RX ORDER — PREGABALIN 150 MG/1
150 CAPSULE ORAL 2 TIMES DAILY
Qty: 60 CAPSULE | Refills: 0 | Status: CANCELLED | OUTPATIENT
Start: 2019-12-10

## 2019-12-10 NOTE — TELEPHONE ENCOUNTER
Requested Prescriptions   Pending Prescriptions Disp Refills     QUEtiapine (SEROQUEL) 100 MG tablet [Pharmacy Med Name: QUETIAPINE 100MG TABLETS] 60 tablet 0     Sig: TAKE 2 TABLETS BY MOUTH AT BEDTIME   Last Written Prescription Date:  11/7/19  Last Fill Quantity: 60,  # refills: 0   Last office visit: 12/6/2019 with prescribing provider:  Zarina Martin PA-C   Future Office Visit:   Next 5 appointments (look out 90 days)    Bernard 10, 2020  1:00 PM CST  Return Visit with Zarina Martin PA-C  [facility] ([facility]) [address] [place] [phone] [phone]             Antipsychotic Medications Failed - 12/10/2019  3:13 AM        Failed - Lipid panel on file within the past 12 months     No lab results found.            Failed - CBC on file in past 12 months     Recent Labs   Lab Test 10/24/18  1104   WBC 5.2   RBC 4.56   HGB 11.5*   HCT 38.0                    Failed - A1c or Glucose on file in past 12 months     Recent Labs   Lab Test 09/19/18  1144   GLC 84       Please review patients last 3 weights. If a weight gain of >10 lbs exists, you may refill the prescription once after instructing the patient to schedule an appointment within the next 30 days.    Wt Readings from Last 3 Encounters:   03/21/19 69.6 kg (153 lb 8 oz)   03/14/19 70.8 kg (156 lb)   01/09/19 67.7 kg (149 lb 4.8 oz)             Passed - Blood pressure under 140/90 in past 12 months     BP Readings from Last 3 Encounters:   12/06/19 110/64   09/26/19 100/64   03/21/19 112/60                 Passed - Patient is 12 years of age or older        Passed - Heart Rate on file within past 12 months     Pulse Readings from Last 3 Encounters:   03/21/19 106   03/14/19 91   01/09/19 94               Passed - Medication is active on med list        Passed - Patient is not pregnant        Passed - No positve pregnancy test on file in past 12 months        Passed - Recent  "(6 mo) or future (30 days) visit within the authorizing provider's specialty     Patient had office visit in the last 6 months or has a visit in the next 30 days with authorizing provider or within the authorizing provider's specialty.  See \"Patient Info\" tab in inbasket, or \"Choose Columns\" in Meds & Orders section of the refill encounter.             "

## 2019-12-10 NOTE — TELEPHONE ENCOUNTER
Pending Prescriptions:                       Disp   Refills    pregabalin (LYRICA) 150 MG capsule        60 cap*0            Sig: Take 1 capsule (150 mg) by mouth 2 times daily    Pt is out of med.   Please call pt at [phone] with questions.   Thank you.  gennaro

## 2019-12-11 RX ORDER — QUETIAPINE FUMARATE 100 MG/1
TABLET, FILM COATED ORAL
Qty: 60 TABLET | Refills: 0 | Status: SHIPPED | OUTPATIENT
Start: 2019-12-11

## 2019-12-11 RX ORDER — PREGABALIN 150 MG/1
CAPSULE ORAL
Qty: 60 CAPSULE | Refills: 0 | Status: SHIPPED | OUTPATIENT
Start: 2019-12-11 | End: 2020-01-10

## 2019-12-11 NOTE — TELEPHONE ENCOUNTER
Patient has upcoming/ pending appointment:    Next 5 appointments (look out 90 days)    Bernard 10, 2020  1:00 PM CST  Return Visit with Zarina Martin PA-C  [facility] [place] [address] [place] [id number]  [phone]          Rx refilled per [facility] refill protocol.    Mildred Dunn BSN, RN

## 2019-12-16 NOTE — TELEPHONE ENCOUNTER
Lyrica was filled 12/11/19 - disp 60 with no refills to the same pharmacy.  This is a duplicate.

## 2020-01-07 DIAGNOSIS — L70.0 ACNE VULGARIS: ICD-10-CM

## 2020-01-07 NOTE — TELEPHONE ENCOUNTER
",Last Written Prescription Date:  12/06/19  Last Fill Quantity: 60,  # refills: 0   Last office visit: 12/6/2019 with prescribing provider:     Future Office Visit:   Next 5 appointments (look out 90 days)    Bernard 10, 2020  1:00 PM CST  Return Visit with Zarina Martin PA-C  [facility] ([facility]) [address] [place] [address] [phone]             Requested Prescriptions   Pending Prescriptions Disp Refills     spironolactone (ALDACTONE) 50 MG tablet 60 tablet 0     Sig: Take one by mouth in the AM and one in the PM.       Diuretics (Including Combos) Protocol Failed - 1/7/2020  4:19 PM        Failed - Normal serum sodium on file in past 12 months     Recent Labs   Lab Test 09/19/18  1144                 Passed - Blood pressure under 140/90 in past 12 months     BP Readings from Last 3 Encounters:   12/06/19 110/64   09/26/19 100/64   03/21/19 112/60                 Passed - Recent (12 mo) or future (30 days) visit within the authorizing provider's specialty     Patient has had an office visit with the authorizing provider or a provider within the authorizing providers department within the previous 12 mos or has a future within next 30 days. See \"Patient Info\" tab in inbasket, or \"Choose Columns\" in Meds & Orders section of the refill encounter.              Passed - Medication is active on med list        Passed - Patient is age 18 or older        Passed - No active pregancy on record        Passed - Normal serum creatinine on file in past 12 months     Recent Labs   Lab Test 09/26/19  1542   CR 0.69              Passed - Normal serum potassium on file in past 12 months     Recent Labs   Lab Test 09/26/19  1542   POTASSIUM 3.8                    Passed - No positive pregnancy test in past 12 months          "

## 2020-01-08 DIAGNOSIS — L70.0 ACNE VULGARIS: ICD-10-CM

## 2020-01-08 RX ORDER — SPIRONOLACTONE 50 MG/1
TABLET, FILM COATED ORAL
Qty: 60 TABLET | Refills: 0 | OUTPATIENT
Start: 2020-01-08

## 2020-01-09 NOTE — TELEPHONE ENCOUNTER
"Requested Prescriptions   Pending Prescriptions Disp Refills     benzoyl peroxide (BENZOYL PEROXIDE WASH) 5 % external liquid 226 g 3     Sig: Wash area affected by acne once a day in the shower.       Topical Acne Medications Protocol Passed - 1/8/2020  5:55 PM        Passed - Patient is 12 years of age or older        Passed - Recent (12 mo) or future (30 days) visit within the authorizing provider's specialty     Patient has had an office visit with the authorizing provider or a provider within the authorizing providers department within the previous 12 mos or has a future within next 30 days. See \"Patient Info\" tab in inbasket, or \"Choose Columns\" in Meds & Orders section of the refill encounter.              Passed - Medication is active on med list        benzoyl peroxide (BENZOYL PEROXIDE WASH) 5 % external liquid 226 g 3 9/26/2019       Last Written Prescription Date:  9/26/2019  Last Fill Quantity: 226,  # refills: 3   Last office visit: 12/6/2019 with prescribing provider:  Veronica   Future Office Visit: 1/10/2020  Next 5 appointments (look out 90 days)    Bernard 10, 2020  1:00 PM CST  [place] Visit with Zarina Martin PA-C  [facility] [place] [address] [phone]           "

## 2020-01-27 DIAGNOSIS — L70.0 ACNE VULGARIS: ICD-10-CM

## 2020-01-27 NOTE — TELEPHONE ENCOUNTER
"Requested Prescriptions   Pending Prescriptions Disp Refills     spironolactone (ALDACTONE) 50 MG tablet 60 tablet 0     Sig: Take one by mouth in the AM and one in the PM.   Last Written Prescription Date:  12/6/2019  Last Fill Quantity: 60 tablet,  # refills: 0   Last office visit: 12/6/2019 with prescribing provider:  RICARDO Martin    Future Office Visit:        Diuretics (Including Combos) Protocol Failed - 1/27/2020 10:18 AM        Failed - Normal serum sodium on file in past 12 months     Recent Labs   Lab Test 09/19/18  1144                 Passed - Blood pressure under 140/90 in past 12 months     BP Readings from Last 3 Encounters:   12/06/19 110/64   09/26/19 100/64   03/21/19 112/60                 Passed - Recent (12 mo) or future (30 days) visit within the authorizing provider's specialty     Patient has had an office visit with the authorizing provider or a provider within the authorizing providers department within the previous 12 mos or has a future within next 30 days. See \"Patient Info\" tab in inbasket, or \"Choose Columns\" in Meds & Orders section of the refill encounter.              Passed - Medication is active on med list        Passed - Patient is age 18 or older        Passed - No active pregancy on record        Passed - Normal serum creatinine on file in past 12 months     Recent Labs   Lab Test 09/26/19  1542   CR 0.69              Passed - Normal serum potassium on file in past 12 months     Recent Labs   Lab Test 09/26/19  1542   POTASSIUM 3.8                    Passed - No positive pregnancy test in past 12 months          "

## 2020-01-28 RX ORDER — SPIRONOLACTONE 50 MG/1
TABLET, FILM COATED ORAL
Qty: 60 TABLET | Refills: 0 | Status: SHIPPED | OUTPATIENT
Start: 2020-01-28 | End: 2020-02-26

## 2020-02-10 NOTE — TELEPHONE ENCOUNTER
"Pt seen yesterday and has another appointment scheduled 10/2/19.    Requested Prescriptions   Pending Prescriptions Disp Refills     buPROPion (WELLBUTRIN XL) 300 MG 24 hr tablet [Pharmacy Med Name: BUPROPION XL 300MG TABLETS] 90 tablet 0     Sig: TAKE 1 TABLET(300 MG) BY MOUTH DAILY       SSRIs Protocol Failed - 9/26/2019  2:15 PM        Failed - PHQ-9 score less than 5 in past 6 months     Please review last PHQ-9 score.           Passed - Medication is Bupropion     If the medication is Bupropion (Wellbutrin), and the patient is taking for smoking cessation; OK to refill.          Passed - Medication is active on med list        Passed - Patient is age 18 or older        Passed - No active pregnancy on record        Passed - No positive pregnancy test in last 12 months        Passed - Recent (6 mo) or future (30 days) visit within the authorizing provider's specialty     Patient had office visit in the last 6 months or has a visit in the next 30 days with authorizing provider or within the authorizing provider's specialty.  See \"Patient Info\" tab in inbasket, or \"Choose Columns\" in Meds & Orders section of the refill encounter.              "
"Requested Prescriptions   Pending Prescriptions Disp Refills     buPROPion (WELLBUTRIN XL) 300 MG 24 hr tablet [Pharmacy Med Name: BUPROPION XL 300MG TABLETS] 90 tablet 0     Sig: TAKE 1 TABLET(300 MG) BY MOUTH DAILY   Last Written Prescription Date:  7/1/19  Last Fill Quantity: 90,  # refills: 0   Last office visit: 3/21/2019 with prescribing provider:  Alda Mosquera Ra, APRN CNP   Future Office Visit:   Next 5 appointments (look out 90 days)    Oct 02, 2019 11:00 AM CDT  Office Visit with TONA Ford Ra, CNP  [facility] ([facility]) [address] [place] [address] [phone]             SSRIs Protocol Failed - 9/26/2019  3:21 AM        Failed - PHQ-9 score less than 5 in past 6 months     Please review last PHQ-9 score.   PHQ-9 SCORE 9/19/2018 10/10/2018 3/14/2019   PHQ-9 Total Score 17 11 9     JOEY-7 SCORE 9/19/2018 10/10/2018 3/14/2019   Total Score 19 11 7               Passed - Medication is Bupropion     If the medication is Bupropion (Wellbutrin), and the patient is taking for smoking cessation; OK to refill.          Passed - Medication is active on med list        Passed - Patient is age 18 or older        Passed - No active pregnancy on record        Passed - No positive pregnancy test in last 12 months        Passed - Recent (6 mo) or future (30 days) visit within the authorizing provider's specialty     Patient had office visit in the last 6 months or has a visit in the next 30 days with authorizing provider or within the authorizing provider's specialty.  See \"Patient Info\" tab in inbasket, or \"Choose Columns\" in Meds & Orders section of the refill encounter.              "
Called patient and LM informing that medication was refilled and sent to pharmacy.   Maite Pacheco MA     
Done.  JALEEL  
Pt has appointment today.  Yasemin Campos BSN, RN    
No

## 2020-02-11 DIAGNOSIS — M79.7 FIBROMYALGIA: ICD-10-CM

## 2020-02-12 RX ORDER — PREGABALIN 150 MG/1
CAPSULE ORAL
Qty: 60 CAPSULE | Refills: 0 | Status: SHIPPED | OUTPATIENT
Start: 2020-02-12 | End: 2020-03-17

## 2020-02-12 NOTE — TELEPHONE ENCOUNTER
Lyrica 150 mg      Last Written Prescription Date:  1/10/2020  Last Fill Quantity: 60,   # refills: 0  Last Office Visit: 3/21/19  Future Office visit:    Next 5 appointments (look out 90 days)    Feb 20, 2020  9:40 AM CST  Return Visit with Zarina Martin PA-C  [facility] [place] [address] [place] [phone]  [phone]           Routing refill request to provider for review/approval because:  Drug not on the [facility], [facility] or  Health refill protocol or controlled substance  : 1/12 12/11 11/7    Kimberlyn CHAIREZ RN

## 2020-02-18 ENCOUNTER — TRANSFERRED RECORDS (OUTPATIENT)
Dept: HEALTH INFORMATION MANAGEMENT | Facility: CLINIC | Age: 35
End: 2020-02-18

## 2020-02-19 ENCOUNTER — TELEPHONE (OUTPATIENT)
Dept: FAMILY MEDICINE | Facility: CLINIC | Age: 35
End: 2020-02-19

## 2020-02-19 NOTE — TELEPHONE ENCOUNTER
Patient had called stating unable to drive to appt today. Shaking and numb.    Spoke with patient. Has been laying down and not shaking or numb at this time. Having body aches due to fibromyalgia.    ER yesterday with symptoms. Given lorazepam. Patient stated effective. Had been having symptoms of restless legs and arms at [facility].    Patient c/o not having anyone to drive to appt. Rescheduled for tomorrow 2/20 with KYRIE.    Advised to be seen sooner if symptoms worse.    Patient stated ok to wait until tomorrow.    KYRIE informed of patient condition and appt change.    Amina Duarte RN

## 2020-02-24 ENCOUNTER — PATIENT OUTREACH (OUTPATIENT)
Dept: CARE COORDINATION | Facility: CLINIC | Age: 35
End: 2020-02-24

## 2020-02-24 DIAGNOSIS — Z71.89 OTHER SPECIFIED COUNSELING: Primary | Chronic | ICD-10-CM

## 2020-02-24 NOTE — LETTER
[place] CARE COORDINATION  [address]  [facility] [address]     February 25, 2020    Jeannette Quesada  [address] [facility] [address]      Dear Jeannette,    I am a clinic care coordinator who works with TONA Ford Ra, CNP at [facility]. I recently tried to call and was unable to reach you. Below is a description of clinic care coordination and how I can further assist you.      The clinic care coordinator team is made up of a registered nurse,  and community health worker who understand the health care system. The goal of clinic care coordination is to help you manage your health and improve access to the health care system in the most efficient manner. The team can assist you in meeting your health care goals by providing education, coordinating services, strengthening the communication among your providers  and supporting you with any resource needs.    Please feel free to contact me, at [phone] with any questions or concerns. We are focused on providing you with the highest-quality healthcare experience possible and that all starts with you.     Sincerely,     Flora Dorantes RN Care Coordinator  [facility]  Email: [email or web address]  Phone: [phone]

## 2020-02-24 NOTE — PROGRESS NOTES
Clinic Care Coordination Contact  [facility]/Voicemail    Referral Source: Primary Care Provider - received referral due to frequent no shows.   Clinical Data: Care Coordinator Outreach  Outreach attempted x 1.  Unable to leave message as received prompt that voicemail box was full.   Plan: Care Coordinator will try to reach patient again in 1-2 business days.    Flora Dorantes RN Care Coordinator  [facility]  Email: [email or web address]  Phone: [phone]

## 2020-02-25 DIAGNOSIS — L70.0 ACNE VULGARIS: ICD-10-CM

## 2020-02-25 NOTE — TELEPHONE ENCOUNTER
Routing refill request to provider for review/approval because:  Labs not current:  ALVAREZ Silverman RN, BSN  [facility]

## 2020-02-25 NOTE — TELEPHONE ENCOUNTER
"Requested Prescriptions   Pending Prescriptions Disp Refills     spironolactone (ALDACTONE) 50 MG tablet 60 tablet 0     Sig: Take one by mouth in the AM and one in the PM.   Last Written Prescription Date:  1/28/20  Last Fill Quantity: 60,  # refills: 0   Last office visit: 12/6/2019 with prescribing provider:  Veronica   Future Office Visit:   Next 5 appointments (look out 90 days)    Feb 26, 2020 11:00 AM CST  Office Visit with TONA Ford Ra, CNP  [facility] ([facility]) [address] [facility] [address]  [phone]   Mar 05, 2020 11:40 AM CST  Return Visit with Zarina Martin PA-C  [facility]) [address] [place] [address] [phone]             Diuretics (Including Combos) Protocol Failed - 2/25/2020 12:34 PM        Failed - Normal serum sodium on file in past 12 months     Recent Labs   Lab Test 09/19/18  1144                 Passed - Blood pressure under 140/90 in past 12 months     BP Readings from Last 3 Encounters:   12/06/19 110/64   09/26/19 100/64   03/21/19 112/60                 Passed - Recent (12 mo) or future (30 days) visit within the authorizing provider's specialty     Patient has had an office visit with the authorizing provider or a provider within the authorizing providers department within the previous 12 mos or has a future within next 30 days. See \"Patient Info\" tab in inbasket, or \"Choose Columns\" in Meds & Orders section of the refill encounter.              Passed - Medication is active on med list        Passed - Patient is age 18 or older        Passed - No active pregancy on record        Passed - Normal serum creatinine on file in past 12 months     Recent Labs   Lab Test 09/26/19  1542   CR 0.69              Passed - Normal serum potassium on file in past 12 months     Recent Labs   Lab Test 09/26/19  1542   POTASSIUM 3.8                "     Passed - No positive pregnancy test in past 12 months

## 2020-02-25 NOTE — PROGRESS NOTES
Clinic Care Coordination Contact  [facility]/Voicemail    Referral Source: PCP  Clinical Data: Care Coordinator Outreach  Outreach attempted x 2.  Unable to leave message as number just rings followed by silence.   Plan: Care Coordinator will send care coordination introduction letter with care coordinator contact information and explanation of care coordination services via mail. Care Coordinator will do no further outreaches at this time.    Flora Dorantes RN Care Coordinator  [facility]  Email: [email or web address]  Phone: [phone]

## 2020-02-26 ENCOUNTER — TELEPHONE (OUTPATIENT)
Dept: PALLIATIVE MEDICINE | Facility: CLINIC | Age: 35
End: 2020-02-26

## 2020-02-26 ENCOUNTER — OFFICE VISIT (OUTPATIENT)
Dept: FAMILY MEDICINE | Facility: CLINIC | Age: 35
End: 2020-02-26
Payer: COMMERCIAL

## 2020-02-26 VITALS
WEIGHT: 155.2 LBS | RESPIRATION RATE: 18 BRPM | TEMPERATURE: 97.6 F | OXYGEN SATURATION: 98 % | HEART RATE: 92 BPM | DIASTOLIC BLOOD PRESSURE: 68 MMHG | BODY MASS INDEX: 28.39 KG/M2 | SYSTOLIC BLOOD PRESSURE: 104 MMHG

## 2020-02-26 DIAGNOSIS — R45.1 RESTLESSNESS: ICD-10-CM

## 2020-02-26 DIAGNOSIS — F41.9 ANXIETY: ICD-10-CM

## 2020-02-26 DIAGNOSIS — M79.7 FIBROMYALGIA: ICD-10-CM

## 2020-02-26 DIAGNOSIS — M25.521 PAIN IN JOINT INVOLVING UPPER ARM, RIGHT: Primary | ICD-10-CM

## 2020-02-26 DIAGNOSIS — F33.1 MODERATE EPISODE OF RECURRENT MAJOR DEPRESSIVE DISORDER (H): ICD-10-CM

## 2020-02-26 LAB
ERYTHROCYTE [DISTWIDTH] IN BLOOD BY AUTOMATED COUNT: 16.6 % (ref 10–15)
HCT VFR BLD AUTO: 35.6 % (ref 35–47)
HGB BLD-MCNC: 11 G/DL (ref 11.7–15.7)
MCH RBC QN AUTO: 24.2 PG (ref 26.5–33)
MCHC RBC AUTO-ENTMCNC: 30.9 G/DL (ref 31.5–36.5)
MCV RBC AUTO: 78 FL (ref 78–100)
PLATELET # BLD AUTO: 312 10E9/L (ref 150–450)
RBC # BLD AUTO: 4.54 10E12/L (ref 3.8–5.2)
WBC # BLD AUTO: 6.8 10E9/L (ref 4–11)

## 2020-02-26 PROCEDURE — 99214 OFFICE O/P EST MOD 30 MIN: CPT | Performed by: NURSE PRACTITIONER

## 2020-02-26 PROCEDURE — 82728 ASSAY OF FERRITIN: CPT | Performed by: NURSE PRACTITIONER

## 2020-02-26 PROCEDURE — 83550 IRON BINDING TEST: CPT | Performed by: NURSE PRACTITIONER

## 2020-02-26 PROCEDURE — 36415 COLL VENOUS BLD VENIPUNCTURE: CPT | Performed by: NURSE PRACTITIONER

## 2020-02-26 PROCEDURE — 84443 ASSAY THYROID STIM HORMONE: CPT | Performed by: NURSE PRACTITIONER

## 2020-02-26 PROCEDURE — 85027 COMPLETE CBC AUTOMATED: CPT | Performed by: NURSE PRACTITIONER

## 2020-02-26 PROCEDURE — 83540 ASSAY OF IRON: CPT | Performed by: NURSE PRACTITIONER

## 2020-02-26 RX ORDER — SPIRONOLACTONE 50 MG/1
TABLET, FILM COATED ORAL
Qty: 20 TABLET | Refills: 0 | Status: SHIPPED | OUTPATIENT
Start: 2020-02-26 | End: 2020-03-05

## 2020-02-26 RX ORDER — SPIRONOLACTONE 50 MG/1
TABLET, FILM COATED ORAL
Qty: 60 TABLET | Refills: 0 | OUTPATIENT
Start: 2020-02-26

## 2020-02-26 ASSESSMENT — ANXIETY QUESTIONNAIRES
2. NOT BEING ABLE TO STOP OR CONTROL WORRYING: MORE THAN HALF THE DAYS
6. BECOMING EASILY ANNOYED OR IRRITABLE: MORE THAN HALF THE DAYS
GAD7 TOTAL SCORE: 14
5. BEING SO RESTLESS THAT IT IS HARD TO SIT STILL: NEARLY EVERY DAY
3. WORRYING TOO MUCH ABOUT DIFFERENT THINGS: MORE THAN HALF THE DAYS
7. FEELING AFRAID AS IF SOMETHING AWFUL MIGHT HAPPEN: SEVERAL DAYS
1. FEELING NERVOUS, ANXIOUS, OR ON EDGE: MORE THAN HALF THE DAYS
IF YOU CHECKED OFF ANY PROBLEMS ON THIS QUESTIONNAIRE, HOW DIFFICULT HAVE THESE PROBLEMS MADE IT FOR YOU TO DO YOUR WORK, TAKE CARE OF THINGS AT HOME, OR GET ALONG WITH OTHER PEOPLE: VERY DIFFICULT

## 2020-02-26 ASSESSMENT — PATIENT HEALTH QUESTIONNAIRE - PHQ9
5. POOR APPETITE OR OVEREATING: MORE THAN HALF THE DAYS
SUM OF ALL RESPONSES TO PHQ QUESTIONS 1-9: 14

## 2020-02-26 NOTE — PROGRESS NOTES
Subjective     Jeannette Quesada is a 34 year old female who presents to clinic today for the following health issues:    HPI   ED/UC Followup:    Facility:  [facility]  Date of visit: 2020  Reason for visit: restless legs  Current Status: Patient states that she has still been having pain.  patient was on Quetiapine and states that it was not effective.      Patient would like to follow up on anxiety.  Seen in ED 2020.  She was given a benzodiazepine at that time to help with increased anxiety.  Anxiety has been steadily increasing over the past few months.  She is seeing a therapist at [facility] and has met with a psychiatrist through the clinic as well.  No med changes today but will see them again in 2 weeks.  She discontinued her seroquel.  Felt it wasn't helping and possibly contributing to the restless feeling in her arms and legs.  However, she has continued to have the restless feeling despite discontinuing the medication.  She reports having issues in the past with restless extremities.    She reports her mood is poorly controlled.  Low mood, irritability, anxiety.  She denies active SI.    She reports her fibromyalgia related pain has been increasing over time.  Would like to see pain specialist.      Patient Active Problem List   Diagnosis     Migraine without aura and without status migrainosus, not intractable     Moderate episode of recurrent major depressive disorder (H)     CARDIOVASCULAR SCREENING; LDL GOAL LESS THAN 160     Fibromyalgia     Generalized anxiety disorder     Past Surgical History:   Procedure Laterality Date      SECTION       CHOLECYSTECTOMY, LAPOROSCOPIC       TUBAL LIGATION         Social History     Tobacco Use     Smoking status: Former Smoker     Packs/day: 0.00     Types: Cigarettes     Last attempt to quit: 2018     Years since quittin.6     Smokeless tobacco: Never Used   Substance Use Topics     Alcohol use: Yes     Comment: Rare     Family  History   Problem Relation Age of Onset     Family History Negative Mother      Family History Negative Father              Reviewed and updated as needed this visit by Provider         Review of Systems   SEE HPI.      Objective    /68 (BP Location: Right arm, Patient Position: Chair, Cuff Size: Adult Regular)   Pulse 92   Temp 97.6  F (36.4  C) (Oral)   Resp 18   Wt 70.4 kg (155 lb 3.2 oz)   LMP  (LMP Unknown)   SpO2 98%   BMI 28.39 kg/m    Body mass index is 28.39 kg/m .  Physical Exam   GENERAL: healthy, alert and no distress  PSYCH: mentation appears normal, tearful and anxious    Diagnostic Test Results:  Labs reviewed in Epic        Assessment & Plan     1. Pain in joint involving upper arm, right  Previously established with neurology.  More upper arm, localized symptoms.  Labs today as well as neurology referral.  - CBC with platelets  - NEUROLOGY ADULT REFERRAL    2. Anxiety  Continue to work with therapist and psychiatrist.  Labs today.  - TSH with free T4 reflex    3. Fibromyalgia  - PAIN MANAGEMENT REFERRAL    4. Moderate episode of recurrent major depressive disorder (H)  See above.  Consider change from zoloft to cymbalta.  Will have psychiatry weigh in.  - TSH with free T4 reflex    5. Restlessness  Labs.  - CBC with platelets  - Ferritin  - Iron and iron binding capacity  - NEUROLOGY ADULT REFERRAL      No follow-ups on file.    TONA Ford Ra, CNP  [facility]

## 2020-02-26 NOTE — LETTER
February 26, 2020    Jeannette Quesada  [address] [place] [address]    Dear Rosemary Machadocome to the [facility].  We are located at [address] [place] [address] [address] [place] [address]. Your appointment at the [facility] has been scheduled on March 25th at 9:30AM with Dom Urrutia MD .    At your first visit, you will meet your team of caregivers who will help you to develop pain management strategies that will last a lifetime. You will meet with our support staff to review your insurance information, and collect your co-payment if required by your insurance company. You will also meet with a medical pain specialist and care coordinator who will assess your pain and develop a plan of care for your successful pain rehabilitation. You should expect to spend approximately 1 hour at your first visit with us. Usually, patients work with us for a period of 6-12 months, and eventually return to their primary doctor once their pain management has stabilized.      To help us make your visit go as smoothly as possible, please bring the following items with you on your visit:       Completed Pain Questionnaire enclosed in this packet.  If you do not bring the completed questionnaire, we may have to reschedule your appointment.    List of any medicines that you are currently taking or have been prescribed    Important NON-[facility] medical information such as medical records or tests results (X-rays, or laboratory tests)    Your health insurance card    Financial resources to cover your co-payment or balance due at the time of service (cash, personal check, Visa, and MasterCard are acceptable methods of payment)     Due to the demand for new patient evaluations, you must notify the scheduling department 48 hours (2 days) in advance if you are not able to keep this appointment. Failure to do so could affect  your ability to reschedule with our clinic. Please be aware that we will not prescribe any medications at your first visit.     Please call [phone] with any questions regarding your appointment. We look forward to meeting you and working to address your health care needs.     Sincerely,    [facility]

## 2020-02-26 NOTE — LETTER
[facility]  [address] [id number] [phone]    2020    Re: Jeannette Quesada  [address] [address]  [phone] (home)     : 1985      To Whom It May Concern:      Jeannette Quesada was seen in clinic 2020.  Please excuse from work 2020-2020.      Sincerely,        TONA Ford Ra CNP

## 2020-02-27 LAB
FERRITIN SERPL-MCNC: 7 NG/ML (ref 12–150)
IRON SATN MFR SERPL: 4 % (ref 15–46)
IRON SERPL-MCNC: 21 UG/DL (ref 35–180)
TIBC SERPL-MCNC: 470 UG/DL (ref 240–430)
TSH SERPL DL<=0.005 MIU/L-ACNC: 0.76 MU/L (ref 0.4–4)

## 2020-02-27 ASSESSMENT — ANXIETY QUESTIONNAIRES: GAD7 TOTAL SCORE: 14

## 2020-02-28 ENCOUNTER — TRANSFERRED RECORDS (OUTPATIENT)
Dept: HEALTH INFORMATION MANAGEMENT | Facility: CLINIC | Age: 35
End: 2020-02-28

## 2020-02-28 LAB — PHQ9 SCORE: 19

## 2020-03-05 ENCOUNTER — OFFICE VISIT (OUTPATIENT)
Dept: FAMILY MEDICINE | Facility: CLINIC | Age: 35
End: 2020-03-05
Payer: COMMERCIAL

## 2020-03-05 VITALS — DIASTOLIC BLOOD PRESSURE: 58 MMHG | SYSTOLIC BLOOD PRESSURE: 102 MMHG

## 2020-03-05 DIAGNOSIS — L70.0 ACNE VULGARIS: ICD-10-CM

## 2020-03-05 DIAGNOSIS — L81.0 POST-INFLAMMATORY HYPERPIGMENTATION: Primary | ICD-10-CM

## 2020-03-05 DIAGNOSIS — L90.5 ACNE SCARRING: ICD-10-CM

## 2020-03-05 PROCEDURE — 99213 OFFICE O/P EST LOW 20 MIN: CPT | Performed by: PHYSICIAN ASSISTANT

## 2020-03-05 RX ORDER — SPIRONOLACTONE 50 MG/1
TABLET, FILM COATED ORAL
Qty: 360 TABLET | Refills: 1 | Status: SHIPPED | OUTPATIENT
Start: 2020-03-05 | End: 2020-12-24

## 2020-03-05 NOTE — PATIENT INSTRUCTIONS
Proper skin care from [facility]:    -Eliminate harsh soaps as they strip the natural oils from the skin, often resulting in dry itchy skin ( i.e. Dial, Zest, Arpita Spring)  -Use mild soaps such as Cetaphil or Dove Sensitive Skin in the shower. You do not need to use soap on arms, legs, and trunk every time you shower unless visibly soiled.   -Avoid hot or cold showers.  -After showering, lightly dry off and apply moisturizing within 2-3 minutes. This will help trap moisture in the skin.   -Aggressive use of a moisturizer at least 1-2 times a day to the entire body (including -Vanicream, Cetaphil, Aquaphor or Cerave) and moisturize hands after every washing.  -We recommend using moisturizers that come in a tub that needs to be scooped out, not a pump. This has more of an oil base. It will hold moisture in your skin much better than a water base moisturizer. The above recommended are non-pore clogging.      Wear a sunscreen with at least SPF 30 on your face, ears, neck and V of the chest daily. Wear sunscreen on other areas of the body if those areas are exposed to the sun throughout the day. Sunscreens can contain physical and/or chemical blockers. Physical blockers are less likely to clog pores, these include zinc oxide and titanium dioxide. Reapply every two hour and after swimming. Sunscreen examples include Neutrogena, CeraVe, Blue Lizard, Elta MD and many others.    UV radiation  UVA radiation remains constant throughout the day and throughout the year. It is a longer wavelength than UVB and therefore penetrates deeper into the skin leading to immediate and delayed tanning, photoaging, and skin cancer. 70-80% of UVA and UVB radiation occurs between the hours of 10am-2pm.  UVB radiation  UVB radiation causes the most harmful effects and is more significant during the summer months. However, snow and ice can reflect UVB radiation leading to skin damage during the winter months as well. UVB radiation is  responsible for tanning, burning, inflammation, delayed erythema (pinkness), pigmentation (brown spots), and skin cancer.       Acne vulgaris, PIH    Morning regimen:    Wash with either a gentle cleanser such as Cetaphil gentle cleanser or Benzoyl peroxide wash 5%  Apply a gentle moisturizer*  Take pironolactone 50 mg by mouth. Do this for 2-3 weeks then increase to 100mg.    Evening regimen:    Wash with either a gentle cleanser such as Cetaphil gentle cleanser or Benzoyl peroxide wash 5%  Apply a gentle moisturizer (do not need sunscreen at night)  Take 100mg with food    *Facial moisturizer (preferably with an SPF of 30 or greater) such as Cetaphil, CeraVe, or Vanicream. Make sure lotion is non-comedogenic. Sunscreen active ingredients: Physical blockers are less likely to clog pores. Examples include titanium dioxide and zinc oxide  Good body moisturizers include Cetaphil, CeraVe, Eucerin, and Vanicream.    Disc Tretinoin at bedtime, dryness, irritation and way to prevent discussed with patient   Benzoyl Peroxide (BPO) wash daily or every other day depending on dryness  (2.5%-5% BPO on face and 5%-10% on chest and back)  Aggressive use of bland emollients discussed with patient   If taking Doxycycline take with food but avoid calcium-rich foods as this can reduce the efficacy of Doxycycline. Side effects of doxycyline GI upset, esophagitis, and sun sensitivity.   Potential side effects of oral antibiotic N/V/D, rash, allergic reaction, pigment changes, and dizziness.     May take 2-3 months to see 50-70% improvement  May get worse during initial phase of treatment    Pathophysiology discussed with patient and information provided.  I discussed with patient oral versus topical treatments such as oral antibiotics, Spironolactone (Aldactone)(women only),oral contraceptive pills (women only) topical creams,  and over-the-counter treatments.     Acne can be effectively treated, although response may sometimes be  slow.   Where possible, avoid excessively humid conditions such as a sauna, working in an unventilated kitchen or tropical vacations.   If you smoke, stop. Nicotine increases sebum retention and increased scale within the follicles, forming comedones (black and whiteheads).    Minimize the application of oils and cosmetics to the affected skin.  Abrasive skin treatments can aggravate acne.   Try not to scratch or pick the spots as this can increase your risk for permanent scarring in the area.   To avoid sunburn, protect your skin outdoors using a broad spectrum (UVB and UVA) sunscreen and protective clothing.  No relationship between particular foods and acne has been proven. However, reports suggest low glycemic and low dairy diet are helpful for some people.  Spironolactone: This oral medication blocks androgens (hormones that can aggravate acne).  Since this medication is also used as a diuretic, baseline blood work is needed to check your electrolytes and kidney function. Do not get pregnant while on this medication. Drink plenty of water to avoid dehydration. May feel lightheaded from medication and may increase urination.     ACNE INFORMATION     Acne is a skin disease affecting oil glands and hair follicles in your skin.  When these pores do not drain properly, hair follicles can becomes clogged and bacteria can be trapped causing inflammation to occur. Clinical manifestations range from mild to severe, such as comedones (whiteheads and blackheads) or cysts.     Several factors contribute to acne:     1. Hormonal- Androgen (a type of hormone) can cause oil glands to enlarges and produces more sebum (oil).    2. Bacterial- A specific type of bacteria called Propionibacterium acnes are found in these oily follicles and stimulate more inflammation.     3. Genetics- History of family members (parents or siblings)     4. External factors- mechanical trauma, cosmetics, topical steroids or some oral  medications.      Combination therapy is the mainstay of treatment given the multiple factors contributing to acne.  The type of treatment is also dependant on whether you are pregnancy or breastfeeding.     TOPICAL TREATMENTS   Topical Antibiotics These medications help prevent growth of bacteria in your pores.   Topical Exfoliating Agent These are drying agents that will help normalize oil production, unplug pores and reduce bacterial growth. (Note: Make sure you discontinue this medication ONE week prior to waxing or your skin may lift.)     ORAL TREATMENTS   Oral Antibiotics: Tetracyclines are the most commonly used oral antibiotics to treat moderate to severe acne. They are safe to take for months at a time. Some side effects to these medications include: sun sensitivity, stomach upset, dizziness and heartburn. If you experience a sudden onset of rash or severe unusual headache, discontinue the medication and contact your clinician immediately.     Spironolactone: This oral medication blocks androgens (hormones that can aggravate acne).  Since this medication is also used as a diuretic, baseline blood work is needed to check your electrolytes and liver function. Do not get pregnant while on this medication as it can cause birth defects. Make sure to drink plenty of water.    Birth Control Pills (Females only). In order to decrease hormonal fluctuations that affect acne, birth control pills such as Amarilis  or Marta  ( and others) can be effective in treating acne.  We advise you to discuss with your OB/GYN or Primary Care Doctor to be screened and to check if you are eligible to be on this pill.     Accutane  (generic: isotretinoin). This oral medication is a retinoid (Vitamin A derivative like Retin-A) used to treat severe acne that does not respond to the above medications. Accutane  can  help clear acne for years and the average period of treatment is five to seven months, however, the duration of treatment may  be adjusted based on severity. Some people may need more than one treatment.     ALTERNATIVE TREATMENTS   Microdermabrasion & light chemical peels help improve skin texture, decrease pore size, decrease mild scarring & increase absorption of your topical treatments     Blue Spectrum Light Therapy consists of a concentrated visible light that kills bacteria in the oil ducts. It is safe for pregnancy & nursing. At times, ALA (a topical solution) may be applied prior to the Blue Light exposure in order to enhance light penetration.     Pulse Dye Laser (PDL) decreases inflammation and improves certain acne scars.     Recommended facial cleansers, moisturizers & cosmetics:   Cetaphil  CeraVe  (Note: Look for  non-comeodogenic  cleansers & moisturizers)   Clinique , Prescriptives  make-up

## 2020-03-05 NOTE — PROGRESS NOTES
HPI:  Jeannette Quesada is a 34 year old female patient here today for acne follow up on bpo wash and spironolactone 50mg BID with improvment.  Patient states this has been present for a while.  Patient reports the following symptoms: breakout and scarring on face .  Patient reports the following previous treatments: otc, proactive, OCP, and  with minimal improvement. has tried tretinoin but caused increase breakout. Disc accutane at LOV. Pt defers at this time, states she is not mentally in a good place for this. Is following up with mental health  Patient reports the following modifying factors: none.  Associated symptoms: none.  Patient has no other skin complaints today.  Remainder of the HPI, Meds, PMH, Allergies, FH, and SH was reviewed in chart.    Pertinent Hx:   Acne vulgaris  History reviewed. No pertinent past medical history.    Past Surgical History:   Procedure Laterality Date      SECTION       CHOLECYSTECTOMY, LAPOROSCOPIC       TUBAL LIGATION          Family History   Problem Relation Age of Onset     Family History Negative Mother      Family History Negative Father        Social History     Socioeconomic History     Marital status:      Spouse name: Not on file     Number of children: Not on file     Years of education: Not on file     Highest education level: Not on file   Occupational History     Not on file   Social Needs     Financial resource strain: Not on file     Food insecurity:     Worry: Not on file     Inability: Not on file     Transportation needs:     Medical: Not on file     Non-medical: Not on file   Tobacco Use     Smoking status: Former Smoker     Packs/day: 0.00     Types: Cigarettes     Last attempt to quit: 2018     Years since quittin.6     Smokeless tobacco: Never Used   Substance and Sexual Activity     Alcohol use: Yes     Comment: Rare     Drug use: No     Sexual activity: Yes     Birth control/protection: Female Surgical     Comment:  Tubal   Lifestyle     Physical activity:     Days per week: Not on file     Minutes per session: Not on file     Stress: Not on file   Relationships     Social connections:     Talks on phone: Not on file     Gets together: Not on file     Attends [employer] service: Not on file     Active member of club or organization: Not on file     Attends meetings of clubs or organizations: Not on file     Relationship status: Not on file     Intimate partner violence:     Fear of current or ex partner: Not on file     Emotionally abused: Not on file     Physically abused: Not on file     Forced sexual activity: Not on file   Other Topics Concern     Parent/sibling w/ CABG, MI or angioplasty before 65F 55M? Not Asked   Social History Narrative     Not on file       Outpatient Encounter Medications as of 3/5/2020   Medication Sig Dispense Refill     benzoyl peroxide (BENZOYL PEROXIDE WASH) 5 % external liquid Wash area affected by acne once a day in the shower. 226 g 3     buPROPion (WELLBUTRIN XL) 300 MG 24 hr tablet TAKE 1 TABLET(300 MG) BY MOUTH DAILY 90 tablet 0     pregabalin (LYRICA) 150 MG capsule TAKE 1 CAPSULE BY MOUTH TWICE DAILY 60 capsule 0     sertraline (ZOLOFT) 100 MG tablet TAKE 1 TABLET BY MOUTH DAILY 90 tablet 0     sertraline (ZOLOFT) 50 MG tablet TK 1 T PO D  1     spironolactone (ALDACTONE) 50 MG tablet Take one by mouth in the AM and one in the PM. 20 tablet 0     SUMAtriptan (IMITREX) 100 MG tablet TAKE 1 TABLET(100 MG) BY MOUTH AT ONSET OF HEADACHE FOR MIGRAINE. MAY REPEAT IN 2 HOURS. MAX 2 TABLETS/ 24 HOURS 9 tablet 6     SUMAtriptan (IMITREX) 100 MG tablet TK 1 T PO PRN FOR MIGRAINE. MAY REPEAT AFTER 1-2 HOURS IF INEFFECTIVE. MAX 2 T PER 24 HOURS.       tretinoin (RETIN-A) 0.05 % external cream Apply a pea-sized amount to each area affected by acne. Start every 3-4 nights working up to every night. 45 g 3     QUEtiapine (SEROQUEL) 100 MG tablet TAKE 2 TABLETS BY MOUTH AT BEDTIME (Patient not taking:  Reported on 2/26/2020) 60 tablet 0     No facility-administered encounter medications on file as of 3/5/2020.        Review Of Systems:  Skin: breakout and scarring  Eyes: negative  Ears/Nose/Throat: negative  Respiratory: No shortness of breath, dyspnea on exertion, cough, or hemoptysis  Cardiovascular: negative  Gastrointestinal: negative  Genitourinary: negative  Musculoskeletal: negative  Neurologic: negative  Psychiatric: negative  Hematologic/Lymphatic/Immunologic: negative  Endocrine: negative      Objective:     /58   LMP  (LMP Unknown)   Eyes: Conjunctivae/lids: Normal   ENT: Lips:  Normal  MSK: Normal  Cardiovascular: Peripheral edema none  Pulm: Breathing Normal  Neuro/Psych: Orientation: A/O x 3 Normal; Mood/Affect: Normal, NAD, WDWN  Pt accompanied by: self  Following areas examined: face, neck, chest  Lentz skin type:i   Findings:  Numerous atrophic macules on cheeks  Light brown/pink smooth macules on face  Few inflammatory papules on face  Few cystic lesions on sumbental  Assessment and Plan:  1) Acne vulgaris, PIH, acne scarring  Disc peels and laser resurfacing    Morning regimen:    Wash with either a gentle cleanser such as Cetaphil gentle cleanser or Benzoyl peroxide wash 5%  Apply a gentle moisturizer*  Take spironolactone 50 mg by mouth. Do this for 2-3 weeks then increase to 100mg.    Evening regimen:    Wash with either a gentle cleanser such as Cetaphil gentle cleanser or Benzoyl peroxide wash 5%  Apply a gentle moisturizer (do not need sunscreen at night)  Take 100mg with food    *Facial moisturizer (preferably with an SPF of 30 or greater) such as Cetaphil, CeraVe, or Vanicream. Make sure lotion is non-comedogenic. Sunscreen active ingredients: Physical blockers are less likely to clog pores. Examples include titanium dioxide and zinc oxide  Good body moisturizers include Cetaphil, CeraVe, Eucerin, and Vanicream.    Disc Tretinoin at bedtime, dryness, irritation and way to  prevent discussed with patient   Benzoyl Peroxide (BPO) wash daily or every other day depending on dryness  (2.5%-5% BPO on face and 5%-10% on chest and back)  Aggressive use of bland emollients discussed with patient   If taking Doxycycline take with food but avoid calcium-rich foods as this can reduce the efficacy of Doxycycline. Side effects of doxycyline GI upset, esophagitis, and sun sensitivity.   Potential side effects of oral antibiotic N/V/D, rash, allergic reaction, pigment changes, and dizziness.     May take 2-3 months to see 50-70% improvement  May get worse during initial phase of treatment    Pathophysiology discussed with patient and information provided.  I discussed with patient oral versus topical treatments such as oral antibiotics, Spironolactone (Aldactone)(women only),oral contraceptive pills (women only) topical creams,  and over-the-counter treatments.     Acne can be effectively treated, although response may sometimes be slow.   Where possible, avoid excessively humid conditions such as a sauna, working in an unventilated kitchen or tropical vacations.   If you smoke, stop. Nicotine increases sebum retention and increased scale within the follicles, forming comedones (black and whiteheads).    Minimize the application of oils and cosmetics to the affected skin.  Abrasive skin treatments can aggravate acne.   Try not to scratch or pick the spots as this can increase your risk for permanent scarring in the area.   To avoid sunburn, protect your skin outdoors using a broad spectrum (UVB and UVA) sunscreen and protective clothing.  No relationship between particular foods and acne has been proven. However, reports suggest low glycemic and low dairy diet are helpful for some people.  Spironolactone: This oral medication blocks androgens (hormones that can aggravate acne).  Since this medication is also used as a diuretic, baseline blood work is needed to check your electrolytes and kidney  function. Do not get pregnant while on this medication. Drink plenty of water to avoid dehydration. May feel lightheaded from medication and may increase urination.   Follow up in 6 months

## 2020-03-05 NOTE — LETTER
3/5/2020         RE: Jeannette Quesada  [address] [address]        Dear Colleague,    Thank you for referring your patient, Jeannette Quesada, to the [facility]. Please see a copy of my visit note below.    HPI:  Jeannette Quesada is a 34 year old female patient here today for acne follow up on bpo wash and spironolactone 50mg BID with improvment.  Patient states this has been present for a while.  Patient reports the following symptoms: breakout and scarring on face .  Patient reports the following previous treatments: otc, proactive, OCP, and  with minimal improvement. has tried tretinoin but caused increase breakout. Disc accutane at LOV. Pt defers at this time, states she is not mentally in a good place for this. Is following up with mental health  Patient reports the following modifying factors: none.  Associated symptoms: none.  Patient has no other skin complaints today.  Remainder of the HPI, Meds, PMH, Allergies, FH, and SH was reviewed in chart.    Pertinent Hx:   Acne vulgaris  History reviewed. No pertinent past medical history.    Past Surgical History:   Procedure Laterality Date      SECTION       CHOLECYSTECTOMY, LAPOROSCOPIC       TUBAL LIGATION          Family History   Problem Relation Age of Onset     Family History Negative Mother      Family History Negative Father        Social History     Socioeconomic History     Marital status:      Spouse name: Not on file     Number of children: Not on file     Years of education: Not on file     Highest education level: Not on file   Occupational History     Not on file   Social Needs     Financial resource strain: Not on file     Food insecurity:     Worry: Not on file     Inability: Not on file     Transportation needs:     Medical: Not on file     Non-medical: Not on file   Tobacco Use     Smoking status: Former Smoker     Packs/day: 0.00     Types: Cigarettes     Last attempt to  quit: 2018     Years since quittin.6     Smokeless tobacco: Never Used   Substance and Sexual Activity     Alcohol use: Yes     Comment: Rare     Drug use: No     Sexual activity: Yes     Birth control/protection: Female Surgical     Comment: Tubal   Lifestyle     Physical activity:     Days per week: Not on file     Minutes per session: Not on file     Stress: Not on file   Relationships     Social connections:     Talks on phone: Not on file     Gets together: Not on file     Attends Restoration service: Not on file     Active member of club or organization: Not on file     Attends meetings of clubs or organizations: Not on file     Relationship status: Not on file     Intimate partner violence:     Fear of current or ex partner: Not on file     Emotionally abused: Not on file     Physically abused: Not on file     Forced sexual activity: Not on file   Other Topics Concern     Parent/sibling w/ CABG, MI or angioplasty before 65F 55M? Not Asked   Social History Narrative     Not on file       Outpatient Encounter Medications as of 3/5/2020   Medication Sig Dispense Refill     benzoyl peroxide (BENZOYL PEROXIDE WASH) 5 % external liquid Wash area affected by acne once a day in the shower. 226 g 3     buPROPion (WELLBUTRIN XL) 300 MG 24 hr tablet TAKE 1 TABLET(300 MG) BY MOUTH DAILY 90 tablet 0     pregabalin (LYRICA) 150 MG capsule TAKE 1 CAPSULE BY MOUTH TWICE DAILY 60 capsule 0     sertraline (ZOLOFT) 100 MG tablet TAKE 1 TABLET BY MOUTH DAILY 90 tablet 0     sertraline (ZOLOFT) 50 MG tablet TK 1 T PO D  1     spironolactone (ALDACTONE) 50 MG tablet Take one by mouth in the AM and one in the PM. 20 tablet 0     SUMAtriptan (IMITREX) 100 MG tablet TAKE 1 TABLET(100 MG) BY MOUTH AT ONSET OF HEADACHE FOR MIGRAINE. MAY REPEAT IN 2 HOURS. MAX 2 TABLETS/ 24 HOURS 9 tablet 6     SUMAtriptan (IMITREX) 100 MG tablet TK 1 T PO PRN FOR MIGRAINE. MAY REPEAT AFTER 1-2 HOURS IF INEFFECTIVE. MAX 2 T PER 24 HOURS.        tretinoin (RETIN-A) 0.05 % external cream Apply a pea-sized amount to each area affected by acne. Start every 3-4 nights working up to every night. 45 g 3     QUEtiapine (SEROQUEL) 100 MG tablet TAKE 2 TABLETS BY MOUTH AT BEDTIME (Patient not taking: Reported on 2/26/2020) 60 tablet 0     No facility-administered encounter medications on file as of 3/5/2020.        Review Of Systems:  Skin: breakout and scarring  Eyes: negative  Ears/Nose/Throat: negative  Respiratory: No shortness of breath, dyspnea on exertion, cough, or hemoptysis  Cardiovascular: negative  Gastrointestinal: negative  Genitourinary: negative  Musculoskeletal: negative  Neurologic: negative  Psychiatric: negative  Hematologic/Lymphatic/Immunologic: negative  Endocrine: negative      Objective:     /58   LMP  (LMP Unknown)   Eyes: Conjunctivae/lids: Normal   ENT: Lips:  Normal  MSK: Normal  Cardiovascular: Peripheral edema none  Pulm: Breathing Normal  Neuro/Psych: Orientation: A/O x 3 Normal; Mood/Affect: Normal, NAD, WDWN  Pt accompanied by: self  Following areas examined: face, neck, chest  Lentz skin type:i   Findings:  Numerous atrophic macules on cheeks  Light brown/pink smooth macules on face  Few inflammatory papules on face  Few cystic lesions on sumbental  Assessment and Plan:  1) Acne vulgaris, PIH, acne scarring  Disc peels and laser resurfacing    Morning regimen:    Wash with either a gentle cleanser such as Cetaphil gentle cleanser or Benzoyl peroxide wash 5%  Apply a gentle moisturizer*  Take spironolactone 50 mg by mouth. Do this for 2-3 weeks then increase to 100mg.    Evening regimen:    Wash with either a gentle cleanser such as Cetaphil gentle cleanser or Benzoyl peroxide wash 5%  Apply a gentle moisturizer (do not need sunscreen at night)  Take 100mg with food    *Facial moisturizer (preferably with an SPF of 30 or greater) such as Cetaphil, CeraVe, or Vanicream. Make sure lotion is non-comedogenic. Sunscreen  active ingredients: Physical blockers are less likely to clog pores. Examples include titanium dioxide and zinc oxide  Good body moisturizers include Cetaphil, CeraVe, Eucerin, and Vanicream.    Disc Tretinoin at bedtime, dryness, irritation and way to prevent discussed with patient   Benzoyl Peroxide (BPO) wash daily or every other day depending on dryness  (2.5%-5% BPO on face and 5%-10% on chest and back)  Aggressive use of bland emollients discussed with patient   If taking Doxycycline take with food but avoid calcium-rich foods as this can reduce the efficacy of Doxycycline. Side effects of doxycyline GI upset, esophagitis, and sun sensitivity.   Potential side effects of oral antibiotic N/V/D, rash, allergic reaction, pigment changes, and dizziness.     May take 2-3 months to see 50-70% improvement  May get worse during initial phase of treatment    Pathophysiology discussed with patient and information provided.  I discussed with patient oral versus topical treatments such as oral antibiotics, Spironolactone (Aldactone)(women only),oral contraceptive pills (women only) topical creams,  and over-the-counter treatments.     Acne can be effectively treated, although response may sometimes be slow.   Where possible, avoid excessively humid conditions such as a sauna, working in an unventilated kitchen or tropical vacations.   If you smoke, stop. Nicotine increases sebum retention and increased scale within the follicles, forming comedones (black and whiteheads).    Minimize the application of oils and cosmetics to the affected skin.  Abrasive skin treatments can aggravate acne.   Try not to scratch or pick the spots as this can increase your risk for permanent scarring in the area.   To avoid sunburn, protect your skin outdoors using a broad spectrum (UVB and UVA) sunscreen and protective clothing.  No relationship between particular foods and acne has been proven. However, reports suggest low glycemic and low  dairy diet are helpful for some people.  Spironolactone: This oral medication blocks androgens (hormones that can aggravate acne).  Since this medication is also used as a diuretic, baseline blood work is needed to check your electrolytes and kidney function. Do not get pregnant while on this medication. Drink plenty of water to avoid dehydration. May feel lightheaded from medication and may increase urination.   Follow up in 6 months      Again, thank you for allowing me to participate in the care of your patient.        Sincerely,        Zarina Martin PA-C

## 2020-03-11 ENCOUNTER — HEALTH MAINTENANCE LETTER (OUTPATIENT)
Age: 35
End: 2020-03-11

## 2020-03-12 DIAGNOSIS — M79.7 FIBROMYALGIA: ICD-10-CM

## 2020-03-13 NOTE — TELEPHONE ENCOUNTER
Requested Prescriptions   Pending Prescriptions Disp Refills     LYRICA 150 MG capsule [Pharmacy Med Name: LYRICA 150MG CAPSULES] 60 capsule      Sig: TAKE ONE CAPSULE BY MOUTH TWICE DAILY   Last Written Prescription Date:  2/12/20  Last Fill Quantity: 60,  # refills: 0   Last office visit: 3/5/2020 with prescribing provider:  Zarina Martin PA-C    Future Office Visit:        There is no refill protocol information for this order

## 2020-03-13 NOTE — TELEPHONE ENCOUNTER
Requested Prescriptions   Pending Prescriptions Disp Refills     pregabalin (LYRICA) 150 MG capsule [Pharmacy Med Name: PREGABALIN 150MG CAPSULES] 60 capsule      Sig: TAKE 1 CAPSULE BY MOUTH TWICE DAILY   Last Written Prescription Date:  2/12/20  Last Fill Quantity: 60,  # refills: 0   Last office visit: 3/5/2020 with prescribing provider:  Zarina Martin PA-C    Future Office Visit:        There is no refill protocol information for this order

## 2020-03-16 RX ORDER — PREGABALIN 150 MG/1
CAPSULE ORAL
Qty: 60 CAPSULE
Start: 2020-03-16

## 2020-03-16 NOTE — TELEPHONE ENCOUNTER
Routing refill request to provider for review/approval because:  Drug not on the FM[facility], UMP or  Health refill protocol or controlled substance    Pt is out of medication    Kimberlyn CHAIREZ RN

## 2020-03-17 RX ORDER — PREGABALIN 150 MG/1
CAPSULE ORAL
Qty: 60 CAPSULE | Refills: 0 | Status: SHIPPED | OUTPATIENT
Start: 2020-03-17 | End: 2020-04-15

## 2020-03-18 DIAGNOSIS — G43.009 MIGRAINE WITHOUT AURA AND WITHOUT STATUS MIGRAINOSUS, NOT INTRACTABLE: ICD-10-CM

## 2020-03-19 ENCOUNTER — TELEPHONE (OUTPATIENT)
Dept: PALLIATIVE MEDICINE | Facility: CLINIC | Age: 35
End: 2020-03-19

## 2020-03-19 NOTE — TELEPHONE ENCOUNTER
We re taking every precaution to prevent the spread of COVID-19. Our top priority is to protect and care for our patients.     We are reviewing all new patients to determine if their visit is considered essential. We are balancing helping patients with chronic pain with the safety of our patients and staff.    In review of this upcoming new visit, the determination is:  Non-essential    This will be sent to our  staff to adjust schedule as determined by provider.    Dom Urrutia MD on 3/19/2020 at 1:05 PM

## 2020-03-19 NOTE — TELEPHONE ENCOUNTER
"Requested Prescriptions   Pending Prescriptions Disp Refills     SUMAtriptan (IMITREX) 100 MG tablet [Pharmacy Med Name: SUMATRIPTAN 100MG TABLETS] 9 tablet 6     Sig: TAKE 1 TABLET(100 MG) BY MOUTH AT ONSET OF HEADACHE FOR MIGRAINE. MAY REPEAT IN 2 HOURS. MAX 2 TABLETS/ 24 HOURS   Last Written Prescription Date:  3/14/19  Last Fill Quantity: 9,  # refills: 6   Last office visit: 3/5/2020 with prescribing provider:  Zarina Martin PA-C    Future Office Visit:        Serotonin Agonists Failed - 3/18/2020  7:32 PM        Failed - Serotonin Agonist request needs review.     Please review patient's record. If patient has had 8 or more treatments in the past month, please forward to provider.          Passed - Blood pressure under 140/90 in past 12 months     BP Readings from Last 3 Encounters:   03/05/20 102/58   02/26/20 104/68   12/06/19 110/64                 Passed - Recent (12 mo) or future (30 days) visit within the authorizing provider's specialty     Patient has had an office visit with the authorizing provider or a provider within the authorizing providers department within the previous 12 mos or has a future within next 30 days. See \"Patient Info\" tab in inbasket, or \"Choose Columns\" in Meds & Orders section of the refill encounter.              Passed - Medication is active on med list        Passed - Patient is age 18 or older        Passed - No active pregnancy on record        Passed - No positive pregnancy test in past 12 months            "

## 2020-03-20 RX ORDER — SUMATRIPTAN 100 MG/1
TABLET, FILM COATED ORAL
Qty: 9 TABLET | Refills: 1 | Status: SHIPPED | OUTPATIENT
Start: 2020-03-20

## 2020-03-25 ENCOUNTER — TELEPHONE (OUTPATIENT)
Dept: FAMILY MEDICINE | Facility: CLINIC | Age: 35
End: 2020-03-25

## 2020-03-25 NOTE — TELEPHONE ENCOUNTER
Prior Authorization Retail Medication Request    Medication/Dose: Pregabalin 150mg  ICD code (if different than what is on RX):  On Rx  Previously Tried and Failed:  None  Rationale:  Patient has been on medication for more then 1 year and it has been effective for her    Insurance Name:  [employer]    Insurance ID:  [id number]

## 2020-03-25 NOTE — TELEPHONE ENCOUNTER
PA Initiation    Medication:   Insurance Company: [employer] - Phone [phone] Fax [phone]  Pharmacy Filling the Rx: [id number] - [place], [place] - [facility] [place] [address] AT [facility] [address] [place] [address]  Filling Pharmacy Phone: [phone]  Filling Pharmacy Fax: [phone]  Start Date: 3/25/2020    Jeannette Quesada Green: [id number]

## 2020-03-27 NOTE — TELEPHONE ENCOUNTER
Prior Authorization Not Needed per Insurance    Medication: pregabalin  Insurance Company: [employer] - Phone [phone] Fax [phone]  Expected CoPay:      Pharmacy Filling the Rx: [id number] - [place], [place] - [address] [place] [address] AT [facility] [address] [facility] [address] [place] [address]  Pharmacy Notified: Yes  Patient Notified: Yes

## 2020-04-09 NOTE — TELEPHONE ENCOUNTER
Called patient to schedule new eval, patient stated she will call back at another time to schedule      Twila Rosenberg    [facility]

## 2020-04-09 NOTE — TELEPHONE ENCOUNTER
Routing to determine if this can be scheduled as a virtual visit.    Mayra EASON    [facility]

## 2020-04-13 DIAGNOSIS — M79.7 FIBROMYALGIA: ICD-10-CM

## 2020-04-15 RX ORDER — PREGABALIN 150 MG/1
CAPSULE ORAL
Qty: 60 CAPSULE | Refills: 0 | Status: SHIPPED | OUTPATIENT
Start: 2020-04-17 | End: 2020-05-12

## 2020-04-15 NOTE — TELEPHONE ENCOUNTER
Last 4 fills per [place] ;    3/17/2020,2/12/2020, 1/12/2020, 12/11/2019 per KYRIE.    Amina Duarte RN

## 2020-05-11 DIAGNOSIS — M79.7 FIBROMYALGIA: ICD-10-CM

## 2020-05-12 RX ORDER — PREGABALIN 150 MG/1
CAPSULE ORAL
Qty: 60 CAPSULE | Refills: 0 | Status: SHIPPED | OUTPATIENT
Start: 2020-05-12 | End: 2020-06-10

## 2020-05-12 NOTE — TELEPHONE ENCOUNTER
RX [place] reviewed:  reviewed- no concerns    [place]:  [email or web address]    Controlled Substance Refill Request for pregabalin (LYRICA) 150 MG capsule   Problem List Complete:  Yes   checked in past 3 months?  Yes 4/15/20, 3/17/20, 2/18/20      pregabalin (LYRICA) 150 MG capsule   Last Written Prescription Date:  4/17/20  Last Fill Quantity: 60,   # refills: 0  Last Office Visit: 2/26/20  Future Office visit:       Routing refill request to provider for review/approval because:  Drug not on the FM[facility], UMP or  Health refill protocol or controlled substance    Romi Valdivia RN on 5/12/2020 at 10:23 AM

## 2020-06-05 ENCOUNTER — MEDICAL CORRESPONDENCE (OUTPATIENT)
Dept: HEALTH INFORMATION MANAGEMENT | Facility: CLINIC | Age: 35
End: 2020-06-05

## 2020-06-10 DIAGNOSIS — M79.7 FIBROMYALGIA: ICD-10-CM

## 2020-06-10 RX ORDER — PREGABALIN 150 MG/1
CAPSULE ORAL
Qty: 60 CAPSULE | Refills: 0 | Status: SHIPPED | OUTPATIENT
Start: 2020-06-12 | End: 2020-07-10

## 2020-06-10 NOTE — TELEPHONE ENCOUNTER
Last Written Prescription Date:  5/12/20  Last Fill Quantity: 60,  # refills: 0   Last office visit: 2/26/2020 with prescribing provider:     Future Office Visit:          Requested Prescriptions   Pending Prescriptions Disp Refills     pregabalin (LYRICA) 150 MG capsule [Pharmacy Med Name: PREGABALIN 150MG CAPSULES] 60 capsule      Sig: TAKE 1 CAPSULE BY MOUTH TWICE DAILY       There is no refill protocol information for this order

## 2020-06-17 NOTE — TELEPHONE ENCOUNTER
Message sent to Dr. Peter   Routing refill request to provider for review/approval because:  Drug not on the [facility], P or [facility] refill protocol or controlled substance    Tiara Dubose RN - Triage  [facility]

## 2020-07-10 DIAGNOSIS — M79.7 FIBROMYALGIA: ICD-10-CM

## 2020-07-10 RX ORDER — PREGABALIN 150 MG/1
CAPSULE ORAL
Qty: 60 CAPSULE | Refills: 0 | Status: SHIPPED | OUTPATIENT
Start: 2020-07-12 | End: 2020-07-27

## 2020-07-10 NOTE — TELEPHONE ENCOUNTER
Lyrica 150 mg    Last Written Prescription Date:  6/12/2020  Last Fill Quantity: 60,  # refills: 0   Last office visit: 2/26/2020 with prescribing provider:    Future Office Visit:   Next 5 appointments (look out 90 days)    Jul 16, 2020 11:15 AM CDT  Return Visit with Zarina Martin PA-C  [facility] ([facility] [address] [place] [id number] [phone]           [place]     Last fill Lyrica 6/10/2020    Fill 7/1/2020 Gabapentin 300 mg qty 30 per Janet Brown.    Amina Duarte RN

## 2020-07-25 DIAGNOSIS — M79.7 FIBROMYALGIA: ICD-10-CM

## 2020-07-27 RX ORDER — PREGABALIN 150 MG/1
CAPSULE ORAL
Qty: 60 CAPSULE | Refills: 0 | Status: SHIPPED | OUTPATIENT
Start: 2020-08-10

## 2020-07-27 NOTE — TELEPHONE ENCOUNTER
"Requested Prescriptions   Pending Prescriptions Disp Refills     SUMAtriptan (IMITREX) 100 MG tablet [Pharmacy Med Name: SUMATRIPTAN 100MG TABLETS]  Last Written Prescription Date:  8/21/18  Last Fill Quantity: 9 TABLET,  # refills: 6   Last office visit: 1/9/2019 with prescribing provider:  MALINI   Future Office Visit:     9 tablet 0     Sig: TAKE 1 TABLET(100 MG) BY MOUTH AT ONSET OF HEADACHE FOR MIGRAINE. MAY REPEAT IN 2 HOURS. MAX 2 TABLETS/ 24 HOURS    Serotonin Agonists Failed - 1/25/2019  3:18 AM       Failed - Serotonin Agonist request needs review.    Please review patient's record. If patient has had 8 or more treatments in the past month, please forward to provider.         Passed - Blood pressure under 140/90 in past 12 months    BP Readings from Last 3 Encounters:   01/09/19 (P) 108/64   10/10/18 92/58   09/19/18 110/70                Passed - Recent (12 mo) or future (30 days) visit within the authorizing provider's specialty    Patient had office visit in the last 12 months or has a visit in the next 30 days with authorizing provider or within the authorizing provider's specialty.  See \"Patient Info\" tab in inbasket, or \"Choose Columns\" in Meds & Orders section of the refill encounter.             Passed - Medication is active on med list       Passed - Patient is age 18 or older       Passed - No active pregnancy on record       Passed - No positive pregnancy test in past 12 months          "
Call to pt-= she does not need medication. Denied.   Kimberlyn CHAIREZ RN   
Can you check on this please- does she really need this refill?  KYRIE.  
Last written prescription date: 8/21/18     Last fill quantity: 9, # refills: 6       Last office visit: 10/10/18 (see in 3 months)    Future office visit: nothing at this time      Routing refill request to provider for review/approval because: appears that patient has taken more than 8 treatments in a month.     Patricia Romero RN Flex    
Price (Do Not Change): 0.00
Detail Level: Simple
Instructions: This plan will send the code FBSD to the PM system.  DO NOT or CHANGE the price.

## 2020-07-27 NOTE — TELEPHONE ENCOUNTER
Routing refill request to provider for review/approval because:  Drug not on the [facility] refill protocol     Pregabalin 150 mg   Last Written Prescription Date:  7/12/20  Last Fill Quantity: 60,  # refills: 0   Last office visit: 2/26/2020 with prescribing provider:  KYRIE   Future Office Visit:        : 7/10, 6/10, 5/12    Not due til aug 9th.     Kimberlyn CHAIREZ RN

## 2020-07-28 ENCOUNTER — TRANSFERRED RECORDS (OUTPATIENT)
Dept: HEALTH INFORMATION MANAGEMENT | Facility: CLINIC | Age: 35
End: 2020-07-28

## 2020-07-30 DIAGNOSIS — L70.0 ACNE VULGARIS: ICD-10-CM

## 2020-07-30 RX ORDER — BENZOYL PEROXIDE 50 MG/ML
LIQUID TOPICAL
Qty: 226 G | Refills: 3 | Status: SHIPPED | OUTPATIENT
Start: 2020-07-30

## 2020-09-14 DIAGNOSIS — L70.0 ACNE VULGARIS: ICD-10-CM

## 2020-09-14 RX ORDER — SPIRONOLACTONE 50 MG/1
TABLET, FILM COATED ORAL
Qty: 360 TABLET | Refills: 1 | OUTPATIENT
Start: 2020-09-14

## 2020-09-14 NOTE — TELEPHONE ENCOUNTER
"Failed lab for triage protoocl    Requested Prescriptions   Pending Prescriptions Disp Refills     spironolactone (ALDACTONE) 50 MG tablet 360 tablet 1     Sig: Take two by mouth in the AM and two by mouth in the PM.       Diuretics (Including Combos) Protocol Failed - 9/14/2020  8:48 AM        Failed - Normal serum sodium on file in past 12 months     Recent Labs   Lab Test 09/19/18  1144                 Passed - Blood pressure under 140/90 in past 12 months     BP Readings from Last 3 Encounters:   03/05/20 102/58   02/26/20 104/68   12/06/19 110/64                 Passed - Recent (12 mo) or future (30 days) visit within the authorizing provider's specialty     Patient has had an office visit with the authorizing provider or a provider within the authorizing providers department within the previous 12 mos or has a future within next 30 days. See \"Patient Info\" tab in inbasket, or \"Choose Columns\" in Meds & Orders section of the refill encounter.              Passed - Medication is active on med list        Passed - Patient is age 18 or older        Passed - No active pregancy on record        Passed - Normal serum creatinine on file in past 12 months     Recent Labs   Lab Test 09/26/19  1542   CR 0.69              Passed - Normal serum potassium on file in past 12 months     Recent Labs   Lab Test 09/26/19  1542   POTASSIUM 3.8                    Passed - No positive pregnancy test in past 12 months               "

## 2020-12-17 DIAGNOSIS — F33.1 MODERATE EPISODE OF RECURRENT MAJOR DEPRESSIVE DISORDER (H): ICD-10-CM

## 2020-12-17 DIAGNOSIS — L70.0 ACNE VULGARIS: ICD-10-CM

## 2020-12-17 RX ORDER — SPIRONOLACTONE 50 MG/1
TABLET, FILM COATED ORAL
Qty: 360 TABLET | Refills: 1 | OUTPATIENT
Start: 2020-12-17

## 2020-12-21 ENCOUNTER — MYC MEDICAL ADVICE (OUTPATIENT)
Dept: FAMILY MEDICINE | Facility: CLINIC | Age: 35
End: 2020-12-21

## 2020-12-21 NOTE — TELEPHONE ENCOUNTER
Patient is due for a f/u can be virtual. Will forward to TC's to assist with scheduling.     Romi Valdivia RN on 12/21/2020 at 2:57 PM

## 2020-12-24 DIAGNOSIS — L70.0 ACNE VULGARIS: ICD-10-CM

## 2020-12-24 RX ORDER — SPIRONOLACTONE 50 MG/1
TABLET, FILM COATED ORAL
Qty: 360 TABLET | Refills: 1 | Status: SHIPPED | OUTPATIENT
Start: 2020-12-24

## 2020-12-24 NOTE — TELEPHONE ENCOUNTER
"Failed protocol    Requested Prescriptions   Pending Prescriptions Disp Refills     spironolactone (ALDACTONE) 50 MG tablet 360 tablet 1     Sig: Take two by mouth in the AM and two by mouth in the PM.       Diuretics (Including Combos) Protocol Failed - 12/24/2020  8:28 AM        Failed - Normal serum creatinine on file in past 12 months     Recent Labs   Lab Test 09/26/19  1542   CR 0.69              Failed - Normal serum potassium on file in past 12 months     Recent Labs   Lab Test 09/26/19  1542   POTASSIUM 3.8                    Failed - Normal serum sodium on file in past 12 months     Recent Labs   Lab Test 09/19/18  1144                 Passed - Blood pressure under 140/90 in past 12 months     BP Readings from Last 3 Encounters:   03/05/20 102/58   02/26/20 104/68   12/06/19 110/64                 Passed - Recent (12 mo) or future (30 days) visit within the authorizing provider's specialty     Patient has had an office visit with the authorizing provider or a provider within the authorizing providers department within the previous 12 mos or has a future within next 30 days. See \"Patient Info\" tab in inbasket, or \"Choose Columns\" in Meds & Orders section of the refill encounter.              Passed - Medication is active on med list        Passed - Patient is age 18 or older        Passed - No active pregancy on record        Passed - No positive pregnancy test in past 12 months             "

## 2020-12-27 ENCOUNTER — HEALTH MAINTENANCE LETTER (OUTPATIENT)
Age: 35
End: 2020-12-27

## 2020-12-29 RX ORDER — BUPROPION HYDROCHLORIDE 300 MG/1
TABLET ORAL
Qty: 90 TABLET | Refills: 0 | OUTPATIENT
Start: 2020-12-29

## 2020-12-29 NOTE — TELEPHONE ENCOUNTER
Spoke to Jeannette, states this was filled by her psychiatrist    Sent back to  Refills to remove orders.

## 2021-01-27 DIAGNOSIS — L70.0 ACNE VULGARIS: ICD-10-CM

## 2021-01-27 RX ORDER — TRETINOIN 0.5 MG/G
CREAM TOPICAL
Qty: 45 G | Refills: 0 | Status: SHIPPED | OUTPATIENT
Start: 2021-01-27

## 2021-04-25 ENCOUNTER — HEALTH MAINTENANCE LETTER (OUTPATIENT)
Age: 36
End: 2021-04-25

## 2021-08-10 DIAGNOSIS — L70.0 ACNE VULGARIS: ICD-10-CM

## 2021-08-10 RX ORDER — BENZOYL PEROXIDE 50 MG/ML
LIQUID TOPICAL
Qty: 226 G | Refills: 3 | Status: CANCELLED | OUTPATIENT
Start: 2021-08-10

## 2021-08-10 NOTE — LETTER
[facility]  [address] [address]  Phone: [phone]    August 11, 2021      Jeannette Quesada                                                                                                        [facility] [address] [address]            Dear Ms. Quesada,    Many medications require routine follow-up with your Doctor.      At this time we ask that: You schedule a routine office visit with Zarina Martin PA-C in Dermatology.    Your prescription: Cannot be refilled until the above noted follow up has been completed.      Thank you,      [facility]

## 2021-08-11 RX ORDER — BENZOYL PEROXIDE 50 MG/ML
LIQUID TOPICAL
Qty: 226 G | Refills: 3 | OUTPATIENT
Start: 2021-08-11

## 2021-08-11 RX ORDER — SPIRONOLACTONE 50 MG/1
TABLET, FILM COATED ORAL
Qty: 360 TABLET | Refills: 1 | OUTPATIENT
Start: 2021-08-11

## 2021-08-11 NOTE — TELEPHONE ENCOUNTER
Called and LM for patient to call back.    Debbi RN-BSN-N  [facility]  [phone]

## 2021-08-11 NOTE — TELEPHONE ENCOUNTER
[employer] message sent:    Hi Jeannette-    We received a refill request for spironolactone.     Prescriptions  after 1 year and your last office visit was on 3/5/2020.    Elizabeth would like you to schedule a follow-up visit- she is willing to send you 1 dany refill to get you by once this has been done.    Let me know if you have any questions.    Thanks,    PING Werner-BSN-N  [facility]  [phone]

## 2021-08-11 NOTE — TELEPHONE ENCOUNTER
Called and LM for patient to check mychart.    PING Werner-BSN-N  [facility]  [phone]

## 2021-08-11 NOTE — TELEPHONE ENCOUNTER
Snail mail letter sent:    Dear Ms. Penggiacomo,    Many medications require routine follow-up with your Doctor.      At this time we ask that: You schedule a routine office visit with Zarina Martin PA-C in Dermatology.    Your prescription: Cannot be refilled until the above noted follow up has been completed.      Thank you,      [facility]

## 2021-08-11 NOTE — TELEPHONE ENCOUNTER
Routing to Derm provider. KYRIE has not prescribed before. Last labs for medication 9/2019 and 9/2018.    Amina Duarte RN

## 2021-08-17 DIAGNOSIS — L70.0 ACNE VULGARIS: ICD-10-CM

## 2021-08-17 RX ORDER — SPIRONOLACTONE 50 MG/1
TABLET, FILM COATED ORAL
Qty: 360 TABLET | Refills: 1 | Status: CANCELLED | OUTPATIENT
Start: 2021-08-17

## 2021-10-07 DIAGNOSIS — L70.0 ACNE VULGARIS: ICD-10-CM

## 2021-10-08 RX ORDER — SPIRONOLACTONE 50 MG/1
TABLET, FILM COATED ORAL
Qty: 360 TABLET | Refills: 1 | OUTPATIENT
Start: 2021-10-08

## 2021-10-08 NOTE — TELEPHONE ENCOUNTER
"Last Written Prescription Date:  12/24/2020  Last Fill Quantity: 360,  # refills: 1   Last office visit: Visit date not found with prescribing provider:  03/5/2020   Future Office Visit:          Requested Prescriptions   Pending Prescriptions Disp Refills     spironolactone (ALDACTONE) 50 MG tablet 360 tablet 1     Sig: Take two by mouth in the AM and two by mouth in the PM.       Diuretics (Including Combos) Protocol Failed - 10/8/2021  7:45 AM        Failed - Blood pressure under 140/90 in past 12 months     BP Readings from Last 3 Encounters:   03/05/20 102/58   02/26/20 104/68   12/06/19 110/64                 Failed - Recent (12 mo) or future (30 days) visit within the authorizing provider's specialty     Patient has had an office visit with the authorizing provider or a provider within the authorizing providers department within the previous 12 mos or has a future within next 30 days. See \"Patient Info\" tab in inbasket, or \"Choose Columns\" in Meds & Orders section of the refill encounter.              Failed - Normal serum creatinine on file in past 12 months     Recent Labs   Lab Test 09/26/19  1542   CR 0.69              Failed - Normal serum potassium on file in past 12 months     Recent Labs   Lab Test 09/26/19  1542   POTASSIUM 3.8                    Failed - Normal serum sodium on file in past 12 months     Recent Labs   Lab Test 09/19/18  1144                 Passed - Medication is active on med list        Passed - Patient is age 18 or older        Passed - No active pregancy on record        Passed - No positive pregnancy test in past 12 months             "

## 2021-10-09 ENCOUNTER — HEALTH MAINTENANCE LETTER (OUTPATIENT)
Age: 36
End: 2021-10-09

## 2022-05-21 ENCOUNTER — HEALTH MAINTENANCE LETTER (OUTPATIENT)
Age: 37
End: 2022-05-21

## 2022-09-17 ENCOUNTER — HEALTH MAINTENANCE LETTER (OUTPATIENT)
Age: 37
End: 2022-09-17

## 2022-12-09 NOTE — TELEPHONE ENCOUNTER
Prescription approved per [facility] Refill Protocol.  PHQ-9 better. Refilled until appt w/ KYRIE as stated in note.   Kimberlyn CHAIREZ RN    For information on Fall & Injury Prevention, visit: [email or web address] OR  [email or web address] OR  https://www.cdc.gov/steadi/patient.html

## 2023-06-04 ENCOUNTER — HEALTH MAINTENANCE LETTER (OUTPATIENT)
Age: 38
End: 2023-06-04

## 2025-04-16 NOTE — TELEPHONE ENCOUNTER
Routing refill request to provider for review/approval because:  Unclear if patient should continue to take this medication, routing to prescribing provider.    SHARI YaN, RN  [employer] Triage           Summary of plan:  Follow-up in 1 year  Lab work at that time to include a CBC/lipid/CMP/TSH/CBC - ordered   Continue medications as they are      Will review with GYN criteria for MRI of the breast (patient has dense breast tissue) and ? Need for pelvic exam as has ANGELLA and BSO